# Patient Record
Sex: FEMALE | Race: WHITE | NOT HISPANIC OR LATINO | Employment: FULL TIME | URBAN - METROPOLITAN AREA
[De-identification: names, ages, dates, MRNs, and addresses within clinical notes are randomized per-mention and may not be internally consistent; named-entity substitution may affect disease eponyms.]

---

## 2017-01-03 ENCOUNTER — TELEPHONE (OUTPATIENT)
Dept: GENETICS | Facility: CLINIC | Age: 57
End: 2017-01-03

## 2017-01-03 NOTE — TELEPHONE ENCOUNTER
Spoke with pt and scheduled an appt for her on 2/1/17 w/ De[facility] per Dr. Kaye. Pt verbalized understanding.

## 2017-01-04 ENCOUNTER — TELEPHONE (OUTPATIENT)
Dept: ADMINISTRATIVE | Facility: OTHER | Age: 57
End: 2017-01-04

## 2017-01-04 ENCOUNTER — TELEPHONE (OUTPATIENT)
Dept: HEMATOLOGY/ONCOLOGY | Facility: CLINIC | Age: 57
End: 2017-01-04

## 2017-01-04 NOTE — TELEPHONE ENCOUNTER
Returned call to sister and informed her that okay to proceed with surprise party.   Sister verbalized understanding.     ----- Message from Rebecca Fournier sent at 1/4/2017 12:02 PM CST -----  Contact: Pt's sister Ashley   Pt's sister Ashley is calling to speak with a nurse in regards to her sister's chemo and how it will affect her bc they planned a surprise birthday party for her on Saturday and they need to know if they should cancel it bc of her chemo.    Contact number is [phone](Please call this number)

## 2017-01-04 NOTE — TELEPHONE ENCOUNTER
----- Message from Wendi Nguyễn MD sent at 12/21/2016  2:22 PM CST -----  - start [place], Abraxane next week (cleared today)  - RTC 1 week later with labd and EP and chemo

## 2017-01-06 ENCOUNTER — INFUSION (OUTPATIENT)
Dept: INFUSION THERAPY | Facility: HOSPITAL | Age: 57
End: 2017-01-06
Attending: INTERNAL MEDICINE
Payer: COMMERCIAL

## 2017-01-06 VITALS
HEART RATE: 69 BPM | TEMPERATURE: 99 F | SYSTOLIC BLOOD PRESSURE: 118 MMHG | DIASTOLIC BLOOD PRESSURE: 61 MMHG | RESPIRATION RATE: 18 BRPM

## 2017-01-06 DIAGNOSIS — C25.9 METASTASIS FROM PANCREATIC CANCER: Primary | ICD-10-CM

## 2017-01-06 DIAGNOSIS — C79.9 METASTASIS FROM PANCREATIC CANCER: Primary | ICD-10-CM

## 2017-01-06 DIAGNOSIS — C25.9 PANCREATIC ADENOCARCINOMA: Primary | ICD-10-CM

## 2017-01-06 PROCEDURE — 25000003 PHARM REV CODE 250: Performed by: INTERNAL MEDICINE

## 2017-01-06 PROCEDURE — 96367 TX/PROPH/DG ADDL SEQ IV INF: CPT

## 2017-01-06 PROCEDURE — 96417 CHEMO IV INFUS EACH ADDL SEQ: CPT

## 2017-01-06 PROCEDURE — 63600175 PHARM REV CODE 636 W HCPCS: Performed by: INTERNAL MEDICINE

## 2017-01-06 PROCEDURE — 96413 CHEMO IV INFUSION 1 HR: CPT

## 2017-01-06 RX ORDER — SODIUM CHLORIDE 0.9 % (FLUSH) 0.9 %
10 SYRINGE (ML) INJECTION
Status: DISCONTINUED | OUTPATIENT
Start: 2017-01-06 | End: 2017-01-06 | Stop reason: HOSPADM

## 2017-01-06 RX ORDER — HEPARIN 100 UNIT/ML
500 SYRINGE INTRAVENOUS
Status: DISCONTINUED | OUTPATIENT
Start: 2017-01-06 | End: 2017-01-06 | Stop reason: HOSPADM

## 2017-01-06 RX ADMIN — SODIUM CHLORIDE: 0.9 INJECTION, SOLUTION INTRAVENOUS at 01:01

## 2017-01-06 RX ADMIN — SODIUM CHLORIDE 1410 MG: 0.9 INJECTION, SOLUTION INTRAVENOUS at 01:01

## 2017-01-06 RX ADMIN — Medication 1 MG: at 01:01

## 2017-01-06 RX ADMIN — PACLITAXEL 180 MG: 100 INJECTION, POWDER, LYOPHILIZED, FOR SUSPENSION INTRAVENOUS at 01:01

## 2017-01-12 ENCOUNTER — LAB VISIT (OUTPATIENT)
Dept: LAB | Facility: HOSPITAL | Age: 57
End: 2017-01-12
Attending: INTERNAL MEDICINE
Payer: COMMERCIAL

## 2017-01-12 DIAGNOSIS — C25.9 METASTASIS FROM PANCREATIC CANCER: Primary | ICD-10-CM

## 2017-01-12 DIAGNOSIS — R11.2 NAUSEA AND VOMITING, INTRACTABILITY OF VOMITING NOT SPECIFIED, UNSPECIFIED VOMITING TYPE: Primary | ICD-10-CM

## 2017-01-12 DIAGNOSIS — C79.9 METASTASIS FROM PANCREATIC CANCER: ICD-10-CM

## 2017-01-12 DIAGNOSIS — C79.9 METASTASIS FROM PANCREATIC CANCER: Primary | ICD-10-CM

## 2017-01-12 DIAGNOSIS — C25.9 METASTASIS FROM PANCREATIC CANCER: ICD-10-CM

## 2017-01-12 LAB
ALBUMIN SERPL BCP-MCNC: 2.9 G/DL
ALP SERPL-CCNC: 87 U/L
ALT SERPL W/O P-5'-P-CCNC: 11 U/L
ANION GAP SERPL CALC-SCNC: 9 MMOL/L
AST SERPL-CCNC: 13 U/L
BILIRUB SERPL-MCNC: 0.5 MG/DL
BUN SERPL-MCNC: 8 MG/DL
CALCIUM SERPL-MCNC: 9.4 MG/DL
CHLORIDE SERPL-SCNC: 105 MMOL/L
CO2 SERPL-SCNC: 26 MMOL/L
CREAT SERPL-MCNC: 0.6 MG/DL
ERYTHROCYTE [DISTWIDTH] IN BLOOD BY AUTOMATED COUNT: 13.2 %
EST. GFR  (AFRICAN AMERICAN): >60 ML/MIN/1.73 M^2
EST. GFR  (NON AFRICAN AMERICAN): >60 ML/MIN/1.73 M^2
GLUCOSE SERPL-MCNC: 96 MG/DL
HCT VFR BLD AUTO: 30.2 %
HGB BLD-MCNC: 10.2 G/DL
MCH RBC QN AUTO: 29.4 PG
MCHC RBC AUTO-ENTMCNC: 33.8 %
MCV RBC AUTO: 87 FL
NEUTROPHILS # BLD AUTO: 0.7 K/UL
PLATELET # BLD AUTO: 161 K/UL
PMV BLD AUTO: 8.8 FL
POTASSIUM SERPL-SCNC: 4.4 MMOL/L
PROT SERPL-MCNC: 6.4 G/DL
RBC # BLD AUTO: 3.47 M/UL
SODIUM SERPL-SCNC: 140 MMOL/L
WBC # BLD AUTO: 1.47 K/UL

## 2017-01-12 PROCEDURE — 80053 COMPREHEN METABOLIC PANEL: CPT

## 2017-01-12 PROCEDURE — 36415 COLL VENOUS BLD VENIPUNCTURE: CPT

## 2017-01-12 PROCEDURE — 85027 COMPLETE CBC AUTOMATED: CPT

## 2017-01-12 RX ORDER — ONDANSETRON 8 MG/1
8 TABLET, ORALLY DISINTEGRATING ORAL EVERY 6 HOURS PRN
Qty: 100 TABLET | Refills: 1 | Status: SHIPPED | OUTPATIENT
Start: 2017-01-12 | End: 2017-03-16 | Stop reason: SDUPTHER

## 2017-01-17 RX ORDER — HEPARIN 100 UNIT/ML
500 SYRINGE INTRAVENOUS
Status: CANCELLED | OUTPATIENT
Start: 2017-01-17

## 2017-01-17 RX ORDER — SODIUM CHLORIDE 0.9 % (FLUSH) 0.9 %
10 SYRINGE (ML) INJECTION
Status: CANCELLED | OUTPATIENT
Start: 2017-01-17

## 2017-01-19 ENCOUNTER — LAB VISIT (OUTPATIENT)
Dept: LAB | Facility: HOSPITAL | Age: 57
End: 2017-01-19
Attending: INTERNAL MEDICINE
Payer: COMMERCIAL

## 2017-01-19 DIAGNOSIS — C79.9 METASTASIS FROM PANCREATIC CANCER: ICD-10-CM

## 2017-01-19 DIAGNOSIS — C25.9 METASTASIS FROM PANCREATIC CANCER: ICD-10-CM

## 2017-01-19 LAB
ALBUMIN SERPL BCP-MCNC: 3.4 G/DL
ALP SERPL-CCNC: 90 U/L
ALT SERPL W/O P-5'-P-CCNC: 13 U/L
ANION GAP SERPL CALC-SCNC: 7 MMOL/L
AST SERPL-CCNC: 21 U/L
BILIRUB SERPL-MCNC: 0.4 MG/DL
BUN SERPL-MCNC: 10 MG/DL
CALCIUM SERPL-MCNC: 9.7 MG/DL
CHLORIDE SERPL-SCNC: 104 MMOL/L
CO2 SERPL-SCNC: 28 MMOL/L
CREAT SERPL-MCNC: 0.7 MG/DL
ERYTHROCYTE [DISTWIDTH] IN BLOOD BY AUTOMATED COUNT: 14.5 %
EST. GFR  (AFRICAN AMERICAN): >60 ML/MIN/1.73 M^2
EST. GFR  (NON AFRICAN AMERICAN): >60 ML/MIN/1.73 M^2
GLUCOSE SERPL-MCNC: 148 MG/DL
HCT VFR BLD AUTO: 36 %
HGB BLD-MCNC: 12 G/DL
MCH RBC QN AUTO: 29.7 PG
MCHC RBC AUTO-ENTMCNC: 33.3 %
MCV RBC AUTO: 89 FL
NEUTROPHILS # BLD AUTO: 3.1 K/UL
PLATELET # BLD AUTO: 242 K/UL
PMV BLD AUTO: 9.4 FL
POTASSIUM SERPL-SCNC: 4.7 MMOL/L
PROT SERPL-MCNC: 6.9 G/DL
RBC # BLD AUTO: 4.04 M/UL
SODIUM SERPL-SCNC: 139 MMOL/L
WBC # BLD AUTO: 4.78 K/UL

## 2017-01-19 PROCEDURE — 36415 COLL VENOUS BLD VENIPUNCTURE: CPT

## 2017-01-19 PROCEDURE — 85027 COMPLETE CBC AUTOMATED: CPT

## 2017-01-19 PROCEDURE — 80053 COMPREHEN METABOLIC PANEL: CPT

## 2017-01-20 ENCOUNTER — INFUSION (OUTPATIENT)
Dept: INFUSION THERAPY | Facility: HOSPITAL | Age: 57
End: 2017-01-20
Attending: INTERNAL MEDICINE
Payer: COMMERCIAL

## 2017-01-20 VITALS
DIASTOLIC BLOOD PRESSURE: 64 MMHG | SYSTOLIC BLOOD PRESSURE: 125 MMHG | BODY MASS INDEX: 21.37 KG/M2 | WEIGHT: 105.81 LBS | HEART RATE: 74 BPM | RESPIRATION RATE: 16 BRPM | TEMPERATURE: 98 F

## 2017-01-20 DIAGNOSIS — C25.9 PANCREATIC ADENOCARCINOMA: Primary | ICD-10-CM

## 2017-01-20 DIAGNOSIS — C25.9 PANCREATIC ADENOCARCINOMA: Chronic | ICD-10-CM

## 2017-01-20 DIAGNOSIS — C25.9 PANCREATIC CANCER METASTASIZED TO LIVER: Primary | ICD-10-CM

## 2017-01-20 DIAGNOSIS — M79.2 NEUROPATHIC PAIN: ICD-10-CM

## 2017-01-20 DIAGNOSIS — C78.7 LIVER METASTASES: ICD-10-CM

## 2017-01-20 DIAGNOSIS — S22.000A THORACIC COMPRESSION FRACTURE, CLOSED, INITIAL ENCOUNTER: ICD-10-CM

## 2017-01-20 DIAGNOSIS — C78.7 PANCREATIC CANCER METASTASIZED TO LIVER: Primary | ICD-10-CM

## 2017-01-20 PROCEDURE — 25000003 PHARM REV CODE 250: Performed by: INTERNAL MEDICINE

## 2017-01-20 PROCEDURE — 96417 CHEMO IV INFUS EACH ADDL SEQ: CPT

## 2017-01-20 PROCEDURE — 63600175 PHARM REV CODE 636 W HCPCS: Performed by: INTERNAL MEDICINE

## 2017-01-20 PROCEDURE — 96367 TX/PROPH/DG ADDL SEQ IV INF: CPT

## 2017-01-20 PROCEDURE — 96413 CHEMO IV INFUSION 1 HR: CPT

## 2017-01-20 RX ORDER — SODIUM CHLORIDE 0.9 % (FLUSH) 0.9 %
10 SYRINGE (ML) INJECTION
Status: DISCONTINUED | OUTPATIENT
Start: 2017-01-20 | End: 2017-01-20 | Stop reason: HOSPADM

## 2017-01-20 RX ORDER — HEPARIN 100 UNIT/ML
500 SYRINGE INTRAVENOUS
Status: DISCONTINUED | OUTPATIENT
Start: 2017-01-20 | End: 2017-01-20 | Stop reason: HOSPADM

## 2017-01-20 RX ORDER — GABAPENTIN 100 MG/1
100 CAPSULE ORAL 3 TIMES DAILY
Qty: 90 CAPSULE | Refills: 2 | Status: SHIPPED | OUTPATIENT
Start: 2017-01-20 | End: 2017-03-08

## 2017-01-20 RX ORDER — METHADONE HYDROCHLORIDE 5 MG/1
5 TABLET ORAL EVERY 12 HOURS
Qty: 60 TABLET | Refills: 0 | Status: SHIPPED | OUTPATIENT
Start: 2017-01-20 | End: 2017-02-10 | Stop reason: SDUPTHER

## 2017-01-20 RX ORDER — GABAPENTIN 300 MG/1
300 CAPSULE ORAL 3 TIMES DAILY
Qty: 90 CAPSULE | Refills: 2 | Status: SHIPPED | OUTPATIENT
Start: 2017-01-20 | End: 2017-03-16 | Stop reason: SDUPTHER

## 2017-01-20 RX ADMIN — PACLITAXEL 180 MG: 100 INJECTION, POWDER, LYOPHILIZED, FOR SUSPENSION INTRAVENOUS at 10:01

## 2017-01-20 RX ADMIN — GRANISETRON HYDROCHLORIDE 1 MG: 1 INJECTION, SOLUTION INTRAVENOUS at 10:01

## 2017-01-20 RX ADMIN — SODIUM CHLORIDE 1410 MG: 0.9 INJECTION, SOLUTION INTRAVENOUS at 11:01

## 2017-01-20 RX ADMIN — SODIUM CHLORIDE: 0.9 INJECTION, SOLUTION INTRAVENOUS at 10:01

## 2017-01-20 NOTE — PLAN OF CARE
Problem: Patient Care Overview  Goal: Plan of Care Review  Outcome: Ongoing (interventions implemented as appropriate)  Pt independently ambulated into chemo infusion today with spouse. Pt skin warm and dry, RR even and unlabored. Pt in NAD and appears comfortable. Pt denies any pain or discomfort and tolerated Gemzar/Abraxane treatment well. Patient follow up discussed and patient verbally expressed understanding.

## 2017-01-20 NOTE — NURSING
perscriptions refilled. Asked jose to tell dr del rosario to call [facility] later Having some numbness down left arm Has had this before Has tumors on back.

## 2017-01-26 ENCOUNTER — LAB VISIT (OUTPATIENT)
Dept: LAB | Facility: HOSPITAL | Age: 57
End: 2017-01-26
Attending: INTERNAL MEDICINE
Payer: COMMERCIAL

## 2017-01-26 DIAGNOSIS — C25.9 PANCREATIC CANCER METASTASIZED TO LIVER: ICD-10-CM

## 2017-01-26 DIAGNOSIS — C78.7 PANCREATIC CANCER METASTASIZED TO LIVER: ICD-10-CM

## 2017-01-26 LAB
ALBUMIN SERPL BCP-MCNC: 3 G/DL
ALP SERPL-CCNC: 79 U/L
ALT SERPL W/O P-5'-P-CCNC: 11 U/L
ANION GAP SERPL CALC-SCNC: 6 MMOL/L
AST SERPL-CCNC: 18 U/L
BILIRUB SERPL-MCNC: 0.5 MG/DL
BUN SERPL-MCNC: 8 MG/DL
CALCIUM SERPL-MCNC: 9.3 MG/DL
CHLORIDE SERPL-SCNC: 105 MMOL/L
CO2 SERPL-SCNC: 26 MMOL/L
CREAT SERPL-MCNC: 0.7 MG/DL
ERYTHROCYTE [DISTWIDTH] IN BLOOD BY AUTOMATED COUNT: 14 %
EST. GFR  (AFRICAN AMERICAN): >60 ML/MIN/1.73 M^2
EST. GFR  (NON AFRICAN AMERICAN): >60 ML/MIN/1.73 M^2
GLUCOSE SERPL-MCNC: 99 MG/DL
HCT VFR BLD AUTO: 31.8 %
HGB BLD-MCNC: 10.8 G/DL
MCH RBC QN AUTO: 29.8 PG
MCHC RBC AUTO-ENTMCNC: 34 %
MCV RBC AUTO: 88 FL
NEUTROPHILS # BLD AUTO: 1.6 K/UL
PLATELET # BLD AUTO: 221 K/UL
PMV BLD AUTO: 9.2 FL
POTASSIUM SERPL-SCNC: 3.8 MMOL/L
PROT SERPL-MCNC: 6.2 G/DL
RBC # BLD AUTO: 3.63 M/UL
SODIUM SERPL-SCNC: 137 MMOL/L
WBC # BLD AUTO: 2.42 K/UL

## 2017-01-26 PROCEDURE — 36415 COLL VENOUS BLD VENIPUNCTURE: CPT

## 2017-01-26 PROCEDURE — 85027 COMPLETE CBC AUTOMATED: CPT

## 2017-01-26 PROCEDURE — 80053 COMPREHEN METABOLIC PANEL: CPT

## 2017-01-26 RX ORDER — HEPARIN 100 UNIT/ML
500 SYRINGE INTRAVENOUS
Status: CANCELLED | OUTPATIENT
Start: 2017-01-26

## 2017-01-26 RX ORDER — SODIUM CHLORIDE 0.9 % (FLUSH) 0.9 %
10 SYRINGE (ML) INJECTION
Status: CANCELLED | OUTPATIENT
Start: 2017-01-26

## 2017-01-27 ENCOUNTER — HOSPITAL ENCOUNTER (OUTPATIENT)
Dept: RADIOLOGY | Facility: HOSPITAL | Age: 57
Discharge: HOME | End: 2017-01-27
Attending: INTERNAL MEDICINE
Payer: COMMERCIAL

## 2017-01-27 ENCOUNTER — INFUSION (OUTPATIENT)
Dept: INFUSION THERAPY | Facility: HOSPITAL | Age: 57
End: 2017-01-27
Attending: INTERNAL MEDICINE
Payer: COMMERCIAL

## 2017-01-27 VITALS
SYSTOLIC BLOOD PRESSURE: 115 MMHG | RESPIRATION RATE: 18 BRPM | WEIGHT: 105.81 LBS | DIASTOLIC BLOOD PRESSURE: 65 MMHG | BODY MASS INDEX: 21.33 KG/M2 | TEMPERATURE: 98 F | HEIGHT: 59 IN | HEART RATE: 76 BPM

## 2017-01-27 DIAGNOSIS — R06.00 DYSPNEA, UNSPECIFIED TYPE: ICD-10-CM

## 2017-01-27 DIAGNOSIS — C25.9 PANCREATIC ADENOCARCINOMA: Primary | ICD-10-CM

## 2017-01-27 DIAGNOSIS — R06.00 DYSPNEA, UNSPECIFIED TYPE: Primary | ICD-10-CM

## 2017-01-27 DIAGNOSIS — J69.0 ASPIRATION PNEUMONIA, UNSPECIFIED ASPIRATION PNEUMONIA TYPE, UNSPECIFIED LATERALITY, UNSPECIFIED PART OF LUNG: Primary | ICD-10-CM

## 2017-01-27 PROCEDURE — 63600175 PHARM REV CODE 636 W HCPCS: Performed by: INTERNAL MEDICINE

## 2017-01-27 PROCEDURE — 96367 TX/PROPH/DG ADDL SEQ IV INF: CPT

## 2017-01-27 PROCEDURE — 71020 XR CHEST PA AND LATERAL: CPT | Mod: TC

## 2017-01-27 PROCEDURE — 96413 CHEMO IV INFUSION 1 HR: CPT

## 2017-01-27 PROCEDURE — 25000003 PHARM REV CODE 250: Performed by: INTERNAL MEDICINE

## 2017-01-27 PROCEDURE — 96417 CHEMO IV INFUS EACH ADDL SEQ: CPT

## 2017-01-27 PROCEDURE — 71020 XR CHEST PA AND LATERAL: CPT | Mod: 26,,, | Performed by: RADIOLOGY

## 2017-01-27 RX ORDER — HEPARIN 100 UNIT/ML
500 SYRINGE INTRAVENOUS
Status: DISCONTINUED | OUTPATIENT
Start: 2017-01-27 | End: 2017-01-27 | Stop reason: HOSPADM

## 2017-01-27 RX ORDER — MOXIFLOXACIN HYDROCHLORIDE 400 MG/1
400 TABLET ORAL DAILY
Qty: 14 TABLET | Refills: 0 | Status: SHIPPED | OUTPATIENT
Start: 2017-01-27 | End: 2017-02-10

## 2017-01-27 RX ORDER — SODIUM CHLORIDE 0.9 % (FLUSH) 0.9 %
10 SYRINGE (ML) INJECTION
Status: DISCONTINUED | OUTPATIENT
Start: 2017-01-27 | End: 2017-01-27 | Stop reason: HOSPADM

## 2017-01-27 RX ADMIN — PACLITAXEL 180 MG: 100 INJECTION, POWDER, LYOPHILIZED, FOR SUSPENSION INTRAVENOUS at 11:01

## 2017-01-27 RX ADMIN — GEMCITABINE HYDROCHLORIDE 1410 MG: 200 INJECTION, POWDER, LYOPHILIZED, FOR SOLUTION INTRAVENOUS at 12:01

## 2017-01-27 RX ADMIN — SODIUM CHLORIDE: 0.9 INJECTION, SOLUTION INTRAVENOUS at 11:01

## 2017-01-27 RX ADMIN — GRANISETRON HYDROCHLORIDE 1 MG: 0.1 INJECTION, SOLUTION INTRAVENOUS at 11:01

## 2017-01-27 NOTE — NURSING
Patient complains of left arm tenderness, mild swelling also noted.  No redness present.  Patient states this started after receiving last weeks chemo and has been applying heating pad intermittently since.  Dr Nguyễn notified and request to see patient in office today after finishing today's chemo to further evaluate the need for a possible ultrasound.  Patient updated on plan of care, verbalizes understanding.  PIV started in right arm.  Will continue to monitor closely.  01/27/2017  Ayana Jain RN

## 2017-01-30 ENCOUNTER — TELEPHONE (OUTPATIENT)
Dept: ADMINISTRATIVE | Facility: OTHER | Age: 57
End: 2017-01-30

## 2017-01-30 ENCOUNTER — TELEPHONE (OUTPATIENT)
Dept: HEMATOLOGY/ONCOLOGY | Facility: CLINIC | Age: 57
End: 2017-01-30

## 2017-01-30 DIAGNOSIS — D70.1 CHEMOTHERAPY INDUCED NEUTROPENIA: ICD-10-CM

## 2017-01-30 DIAGNOSIS — T45.1X5A CHEMOTHERAPY INDUCED NEUTROPENIA: ICD-10-CM

## 2017-01-30 NOTE — TELEPHONE ENCOUNTER
"Returned call to patient.   Patient stated that she is doing well today---yesterday just had a hard time breathing.   Patient wanted to know if Dr. Nguyễn wanted her to come in today as she had had an XR done Friday and was dx with PNA (Dr. Nguyễn called in abx).   Patient also wanted to know if her shot had gotten approved that would "increase her cell count."   I informed her I would send text message to Dr. Nguyễn as she was on service and would get back with her.   Patient verbalized understanding.     Text message sent to Dr. Nguyễn.         ----- Message from Marilia Chung sent at 1/30/2017 10:05 AM CST -----  Contact: Patient  Patient called and wanted to speak with Orin.    Please call her at [phone]    "

## 2017-01-30 NOTE — TELEPHONE ENCOUNTER
----- Message from Wendi Nguyễn MD sent at 1/30/2017 12:58 PM CST -----  Please add to chemo schedule to start neupogen daily x 3 days starting tomorrow  Thanks

## 2017-01-31 ENCOUNTER — INFUSION (OUTPATIENT)
Dept: INFUSION THERAPY | Facility: HOSPITAL | Age: 57
End: 2017-01-31
Attending: INTERNAL MEDICINE
Payer: COMMERCIAL

## 2017-01-31 DIAGNOSIS — D70.1 CHEMOTHERAPY INDUCED NEUTROPENIA: Primary | ICD-10-CM

## 2017-01-31 DIAGNOSIS — T45.1X5A CHEMOTHERAPY INDUCED NEUTROPENIA: Primary | ICD-10-CM

## 2017-01-31 PROCEDURE — 96372 THER/PROPH/DIAG INJ SC/IM: CPT

## 2017-01-31 PROCEDURE — 63600175 PHARM REV CODE 636 W HCPCS: Performed by: INTERNAL MEDICINE

## 2017-01-31 RX ADMIN — FILGRASTIM 480 MCG: 480 INJECTION, SOLUTION INTRAVENOUS; SUBCUTANEOUS at 03:01

## 2017-01-31 NOTE — NURSING
Patient here for injection-teaching done on use and side effects of neupogen-patient and  expressed understanding-tolerated injection well.

## 2017-02-01 ENCOUNTER — TELEPHONE (OUTPATIENT)
Dept: HEMATOLOGY/ONCOLOGY | Facility: CLINIC | Age: 57
End: 2017-02-01

## 2017-02-01 ENCOUNTER — INFUSION (OUTPATIENT)
Dept: INFUSION THERAPY | Facility: HOSPITAL | Age: 57
End: 2017-02-01
Attending: INTERNAL MEDICINE
Payer: COMMERCIAL

## 2017-02-01 DIAGNOSIS — T45.1X5A CHEMOTHERAPY INDUCED NEUTROPENIA: Primary | ICD-10-CM

## 2017-02-01 DIAGNOSIS — D70.1 CHEMOTHERAPY INDUCED NEUTROPENIA: Primary | ICD-10-CM

## 2017-02-01 PROCEDURE — 63600175 PHARM REV CODE 636 W HCPCS: Performed by: INTERNAL MEDICINE

## 2017-02-01 PROCEDURE — 96372 THER/PROPH/DIAG INJ SC/IM: CPT

## 2017-02-01 RX ADMIN — FILGRASTIM 480 MCG: 480 INJECTION, SOLUTION INTRAVENOUS; SUBCUTANEOUS at 02:02

## 2017-02-01 NOTE — TELEPHONE ENCOUNTER
Returned call to patient.   Patient had a question about Zofran and if she was taking medication too often---patient stated she has been taking 4 times a day.   I informed her medication is every 6 hours so 4 times a day was fine.   Patient verbalized understanding.       ----- Message from Christine Mathew sent at 2/1/2017  4:25 PM CST -----  Contact: PT  Would like Orin to call her, regarding her medication. ondansetron (ZOFRAN-ODT) 8 MG [facility]    Call: [phone]

## 2017-02-02 ENCOUNTER — INFUSION (OUTPATIENT)
Dept: INFUSION THERAPY | Facility: HOSPITAL | Age: 57
End: 2017-02-02
Attending: INTERNAL MEDICINE
Payer: COMMERCIAL

## 2017-02-02 DIAGNOSIS — T45.1X5A CHEMOTHERAPY INDUCED NEUTROPENIA: Primary | ICD-10-CM

## 2017-02-02 DIAGNOSIS — D70.1 CHEMOTHERAPY INDUCED NEUTROPENIA: Primary | ICD-10-CM

## 2017-02-02 PROCEDURE — 63600175 PHARM REV CODE 636 W HCPCS: Performed by: INTERNAL MEDICINE

## 2017-02-02 PROCEDURE — 96372 THER/PROPH/DIAG INJ SC/IM: CPT

## 2017-02-02 RX ADMIN — FILGRASTIM 480 MCG: 480 INJECTION, SOLUTION INTRAVENOUS; SUBCUTANEOUS at 02:02

## 2017-02-07 ENCOUNTER — TELEPHONE (OUTPATIENT)
Dept: HEMATOLOGY/ONCOLOGY | Facility: CLINIC | Age: 57
End: 2017-02-07

## 2017-02-07 ENCOUNTER — PATIENT MESSAGE (OUTPATIENT)
Dept: HEMATOLOGY/ONCOLOGY | Facility: CLINIC | Age: 57
End: 2017-02-07

## 2017-02-07 NOTE — TELEPHONE ENCOUNTER
Returned call to patient.   Informed her [facility] message received and fwd to Dr. Nguyễn and as soon as I know something I will give her a call back.   Patient verbalized understanding.       ----- Message from James Polo sent at 2/7/2017 10:52 AM CST -----  Contact: self  Pt is calling to check the status of scheduled appointments for labs at [facility], chemo on Friday at [facility] and a doctor visit.  Contact number [phone]

## 2017-02-08 ENCOUNTER — TELEPHONE (OUTPATIENT)
Dept: HEMATOLOGY/ONCOLOGY | Facility: CLINIC | Age: 57
End: 2017-02-08

## 2017-02-08 DIAGNOSIS — C25.9 METASTASIS FROM PANCREATIC CANCER: Primary | ICD-10-CM

## 2017-02-08 DIAGNOSIS — C79.9 METASTASIS FROM PANCREATIC CANCER: Primary | ICD-10-CM

## 2017-02-08 NOTE — TELEPHONE ENCOUNTER
Returned call to patient.   Patient asked if Dr. Nguyễn could prescribe some breathing treatments for her---patient stated she has a machine at home she would just need to be shown how to use it.   Patient stated that antibiotics have helped a great bit---however, she is still having some tightness in her chest and SOB on exertion.   Patient stated she didn't know if she should just wait until she has her appt with Dr. Nguyễn on Friday.   I informed patient I would fwd message to Dr. Nguyễn to get her recommendations and get back with her.   Patient verbalized understanding.     Message routed to Dr. Nguyễn.       ----- Message from Iona Last sent at 2/8/2017  8:52 AM CST -----  Patient would like the nurse to give her a call back. Says she thinks she needs breathing treatment. Says she has a machine at home but would be the medication and also needs to be shown how to use it. She can be reached at [phone].

## 2017-02-08 NOTE — TELEPHONE ENCOUNTER
Called back and informed her we will see Friday and decide nebs vs inhaler.   Pt verbalized understanding.

## 2017-02-09 ENCOUNTER — LAB VISIT (OUTPATIENT)
Dept: LAB | Facility: HOSPITAL | Age: 57
End: 2017-02-09
Attending: INTERNAL MEDICINE
Payer: COMMERCIAL

## 2017-02-09 DIAGNOSIS — C79.9 METASTASIS FROM PANCREATIC CANCER: ICD-10-CM

## 2017-02-09 DIAGNOSIS — C25.9 METASTASIS FROM PANCREATIC CANCER: ICD-10-CM

## 2017-02-09 LAB
25(OH)D3+25(OH)D2 SERPL-MCNC: 31 NG/ML
ALBUMIN SERPL BCP-MCNC: 3.2 G/DL
ALP SERPL-CCNC: 108 U/L
ALT SERPL W/O P-5'-P-CCNC: 12 U/L
ANION GAP SERPL CALC-SCNC: 7 MMOL/L
AST SERPL-CCNC: 18 U/L
BILIRUB SERPL-MCNC: 0.5 MG/DL
BUN SERPL-MCNC: 9 MG/DL
CALCIUM SERPL-MCNC: 9.2 MG/DL
CHLORIDE SERPL-SCNC: 107 MMOL/L
CO2 SERPL-SCNC: 26 MMOL/L
CREAT SERPL-MCNC: 0.7 MG/DL
ERYTHROCYTE [DISTWIDTH] IN BLOOD BY AUTOMATED COUNT: 17.6 %
EST. GFR  (AFRICAN AMERICAN): >60 ML/MIN/1.73 M^2
EST. GFR  (NON AFRICAN AMERICAN): >60 ML/MIN/1.73 M^2
GLUCOSE SERPL-MCNC: 130 MG/DL
HCT VFR BLD AUTO: 33.9 %
HGB BLD-MCNC: 11 G/DL
MCH RBC QN AUTO: 29.9 PG
MCHC RBC AUTO-ENTMCNC: 32.4 %
MCV RBC AUTO: 92 FL
NEUTROPHILS # BLD AUTO: 3.7 K/UL
PLATELET # BLD AUTO: 205 K/UL
PMV BLD AUTO: 10.4 FL
POTASSIUM SERPL-SCNC: 4.8 MMOL/L
PROT SERPL-MCNC: 6.3 G/DL
RBC # BLD AUTO: 3.68 M/UL
SODIUM SERPL-SCNC: 140 MMOL/L
WBC # BLD AUTO: 6.11 K/UL

## 2017-02-09 PROCEDURE — 85027 COMPLETE CBC AUTOMATED: CPT

## 2017-02-09 PROCEDURE — 82306 VITAMIN D 25 HYDROXY: CPT

## 2017-02-09 PROCEDURE — 36415 COLL VENOUS BLD VENIPUNCTURE: CPT

## 2017-02-09 PROCEDURE — 80053 COMPREHEN METABOLIC PANEL: CPT

## 2017-02-09 RX ORDER — HEPARIN 100 UNIT/ML
500 SYRINGE INTRAVENOUS
Status: CANCELLED | OUTPATIENT
Start: 2017-02-09

## 2017-02-09 RX ORDER — HEPARIN 100 UNIT/ML
500 SYRINGE INTRAVENOUS
Status: CANCELLED | OUTPATIENT
Start: 2017-02-18

## 2017-02-09 RX ORDER — SODIUM CHLORIDE 0.9 % (FLUSH) 0.9 %
10 SYRINGE (ML) INJECTION
Status: CANCELLED | OUTPATIENT
Start: 2017-02-18

## 2017-02-09 RX ORDER — HEPARIN 100 UNIT/ML
500 SYRINGE INTRAVENOUS
Status: CANCELLED | OUTPATIENT
Start: 2017-02-11

## 2017-02-09 RX ORDER — SODIUM CHLORIDE 0.9 % (FLUSH) 0.9 %
10 SYRINGE (ML) INJECTION
Status: CANCELLED | OUTPATIENT
Start: 2017-02-11

## 2017-02-09 RX ORDER — SODIUM CHLORIDE 0.9 % (FLUSH) 0.9 %
10 SYRINGE (ML) INJECTION
Status: CANCELLED | OUTPATIENT
Start: 2017-02-09

## 2017-02-10 ENCOUNTER — OFFICE VISIT (OUTPATIENT)
Dept: HEMATOLOGY/ONCOLOGY | Facility: CLINIC | Age: 57
End: 2017-02-10
Attending: INTERNAL MEDICINE
Payer: COMMERCIAL

## 2017-02-10 ENCOUNTER — INFUSION (OUTPATIENT)
Dept: INFUSION THERAPY | Facility: HOSPITAL | Age: 57
End: 2017-02-10
Attending: INTERNAL MEDICINE
Payer: COMMERCIAL

## 2017-02-10 ENCOUNTER — HOSPITAL ENCOUNTER (OUTPATIENT)
Dept: RADIOLOGY | Facility: OTHER | Age: 57
Discharge: HOME | End: 2017-02-10
Attending: INTERNAL MEDICINE
Payer: COMMERCIAL

## 2017-02-10 VITALS
DIASTOLIC BLOOD PRESSURE: 58 MMHG | RESPIRATION RATE: 20 BRPM | OXYGEN SATURATION: 94 % | WEIGHT: 108.25 LBS | BODY MASS INDEX: 21.82 KG/M2 | HEART RATE: 79 BPM | TEMPERATURE: 98 F | SYSTOLIC BLOOD PRESSURE: 96 MMHG | HEIGHT: 59 IN

## 2017-02-10 VITALS
TEMPERATURE: 98 F | RESPIRATION RATE: 18 BRPM | HEART RATE: 85 BPM | DIASTOLIC BLOOD PRESSURE: 66 MMHG | SYSTOLIC BLOOD PRESSURE: 115 MMHG

## 2017-02-10 DIAGNOSIS — J90 PLEURAL EFFUSION: ICD-10-CM

## 2017-02-10 DIAGNOSIS — S22.000A THORACIC COMPRESSION FRACTURE, CLOSED, INITIAL ENCOUNTER: ICD-10-CM

## 2017-02-10 DIAGNOSIS — J90 PLEURAL EFFUSION: Primary | ICD-10-CM

## 2017-02-10 DIAGNOSIS — C25.9 PANCREATIC ADENOCARCINOMA: Chronic | ICD-10-CM

## 2017-02-10 DIAGNOSIS — R60.9 EDEMA, UNSPECIFIED TYPE: Primary | ICD-10-CM

## 2017-02-10 DIAGNOSIS — Z85.07 HISTORY OF PANCREATIC CANCER: ICD-10-CM

## 2017-02-10 DIAGNOSIS — C78.7 LIVER METASTASES: ICD-10-CM

## 2017-02-10 DIAGNOSIS — C25.9 PANCREATIC ADENOCARCINOMA: Primary | ICD-10-CM

## 2017-02-10 PROCEDURE — 63600175 PHARM REV CODE 636 W HCPCS: Performed by: INTERNAL MEDICINE

## 2017-02-10 PROCEDURE — 71020 XR CHEST PA AND LATERAL: CPT | Mod: 26,,, | Performed by: RADIOLOGY

## 2017-02-10 PROCEDURE — 25000003 PHARM REV CODE 250: Performed by: INTERNAL MEDICINE

## 2017-02-10 PROCEDURE — 96413 CHEMO IV INFUSION 1 HR: CPT

## 2017-02-10 PROCEDURE — 71020 XR CHEST PA AND LATERAL: CPT | Mod: TC

## 2017-02-10 PROCEDURE — 99214 OFFICE O/P EST MOD 30 MIN: CPT | Mod: 25,S$GLB,, | Performed by: INTERNAL MEDICINE

## 2017-02-10 PROCEDURE — 96367 TX/PROPH/DG ADDL SEQ IV INF: CPT

## 2017-02-10 PROCEDURE — 96417 CHEMO IV INFUS EACH ADDL SEQ: CPT

## 2017-02-10 PROCEDURE — 99999 PR PBB SHADOW E&M-EST. PATIENT-LVL III: CPT | Mod: PBBFAC,,, | Performed by: INTERNAL MEDICINE

## 2017-02-10 RX ORDER — FUROSEMIDE 20 MG/1
20 TABLET ORAL EVERY OTHER DAY
Qty: 15 TABLET | Refills: 1 | Status: SHIPPED | OUTPATIENT
Start: 2017-02-10 | End: 2017-02-21 | Stop reason: SDUPTHER

## 2017-02-10 RX ORDER — METHADONE HYDROCHLORIDE 5 MG/1
5 TABLET ORAL EVERY 12 HOURS
Qty: 60 TABLET | Refills: 0 | Status: SHIPPED | OUTPATIENT
Start: 2017-02-10 | End: 2017-03-31 | Stop reason: SDUPTHER

## 2017-02-10 RX ORDER — SODIUM CHLORIDE 0.9 % (FLUSH) 0.9 %
10 SYRINGE (ML) INJECTION
Status: DISCONTINUED | OUTPATIENT
Start: 2017-02-10 | End: 2017-02-10 | Stop reason: HOSPADM

## 2017-02-10 RX ORDER — MORPHINE SULFATE 15 MG/1
45 TABLET, FILM COATED, EXTENDED RELEASE ORAL EVERY 8 HOURS
Qty: 270 TABLET | Refills: 0 | Status: SHIPPED | OUTPATIENT
Start: 2017-02-10 | End: 2017-04-17 | Stop reason: SDUPTHER

## 2017-02-10 RX ORDER — METOCLOPRAMIDE 10 MG/1
10 TABLET ORAL
Qty: 90 TABLET | Refills: 1 | Status: SHIPPED | OUTPATIENT
Start: 2017-02-10 | End: 2017-03-16 | Stop reason: SDUPTHER

## 2017-02-10 RX ORDER — HEPARIN 100 UNIT/ML
500 SYRINGE INTRAVENOUS
Status: DISCONTINUED | OUTPATIENT
Start: 2017-02-10 | End: 2017-02-10 | Stop reason: HOSPADM

## 2017-02-10 RX ADMIN — PACLITAXEL 180 MG: 100 INJECTION, POWDER, LYOPHILIZED, FOR SUSPENSION INTRAVENOUS at 12:02

## 2017-02-10 RX ADMIN — GEMCITABINE 1410 MG: 38 INJECTION, SOLUTION INTRAVENOUS at 01:02

## 2017-02-10 RX ADMIN — SODIUM CHLORIDE: 0.9 INJECTION, SOLUTION INTRAVENOUS at 11:02

## 2017-02-10 RX ADMIN — GRANISETRON HYDROCHLORIDE: 1 INJECTION, SOLUTION INTRAVENOUS at 11:02

## 2017-02-10 NOTE — PROGRESS NOTES
Subjective:       Patient ID: Lashay Whitley is a 57 y.o. female.    Chief Complaint: Follow-up; Chest Congestion; and Wheezing    HPI     Presents for cycle # 2 of chemotherapy  Reports continued shortness of breath- worse with exertion- fairly stable  Notes wheezing  Cough after- clear mucosal discharge also noted in AM  antibiotics have improved symptoms     Weight up  Eating better although still nauseated     Back pain improving      She had a recent MRI and question has been raised if pain from metastatic disease  MRI 11/30/16:  Impression      Mild T4 superior endplate compression fracture.  Degree of fracture has slightly increased as compared to previous MR from 10/25/16.  Persistent mild anterior epidural enhancement visualized posterior to the T4 vertebral body.  Potential pathologic fracture not excluded given patient's malignancy history.    Additional indeterminate T1 hypointense foci within the T5, T8, and T9 vertebral bodies, with potential metastatic disease not excluded.         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   Started on FOLFIRINOX as neoadjuvant therapy and completed 4 cycles.  Started on neoadjuvant chemotherapy and XRT which was completed with some complications.  Underwent a Whipple procedure on 4/12/16.   Pathology was reviewed.   Specimens: Head of pancreas, duodenum, common bile duct, gallbladder  -Procedure: Pancreaticoduodenectomy (Whipple resection), partial pancreatectomy  -Tumor site: Pancreatic head  -Tumor size: 20 mm  -Histologic type: Ductal adenocarcinoma  -Histologic grade: Well-differentiated (grade 1 )  -Microscopic tumor extension: Tumor invades peripancreatic soft tissues  -Margins:  -Margins are negative for invasive carcinoma  -Distance to closest distance to closest retroperitoneal margin is 3 mm  -Distance to closest SMV margin is 1.5 mm  -Distance to closest pancreatic margin is 12 mm  -Treatment effect: Present,  residual cancer with tumor regression but more than single cells or rare small groups of  cells (partial response, score 2)  -No lymphovascular invasion  -Perineural invasion is present  -Pathologic staging: ypT3 N1 MX  -Lymph nodes:  -Total number of lymph nodes examined: 25  -Total number of lymph nodes involved: 1  She started adjuvant gemcitabine but this was discontinued because of poor tolerance.  - she recurred with metastatic disease recently and has had rising   EUS with liver biopsy on 11/22/16 confirmed metastatic disease  CT scans reviewed as well  Bone involvement in T spine required XRT      Also of note she has a history of breast cancer.     Review of Systems   Constitutional: Negative for activity change, appetite change, chills, diaphoresis, fatigue, fever and unexpected weight change.   HENT: Negative for congestion, dental problem, ear pain, hearing loss, mouth sores, nosebleeds, rhinorrhea, tinnitus and trouble swallowing.    Eyes: Negative for redness and visual disturbance.   Respiratory: Negative for cough, chest tightness, shortness of breath and wheezing.    Cardiovascular: Negative for chest pain, palpitations and leg swelling.   Gastrointestinal: Negative for abdominal distention, abdominal pain, blood in stool, constipation, diarrhea, nausea and vomiting.   Genitourinary: Negative for decreased urine volume, difficulty urinating, dysuria, frequency and hematuria.   Musculoskeletal: Negative for arthralgias, back pain, gait problem, joint swelling and neck pain.   Skin: Negative for pallor and rash.   Neurological: Negative for dizziness, weakness, light-headedness, numbness and headaches.   Hematological: Negative for adenopathy. Does not bruise/bleed easily.   Psychiatric/Behavioral: Negative for confusion, dysphoric mood and sleep disturbance.       Objective:      Physical Exam   Constitutional: She is oriented to person, place, and time. She appears well-developed and  well-nourished. No distress.   Presents with her    HENT:   Head: Normocephalic and atraumatic.   Right Ear: External ear normal.   Left Ear: External ear normal.   Nose: Nose normal.   Mouth/Throat: Oropharynx is clear and moist. No oropharyngeal exudate.   Eyes: Conjunctivae and EOM are normal. Pupils are equal, round, and reactive to light. Right eye exhibits no discharge. Left eye exhibits no discharge. No scleral icterus. Right pupil is round and reactive. Left pupil is round and reactive.   Neck: Normal range of motion. Neck supple. No tracheal deviation present. No thyromegaly present.   Cardiovascular: Normal rate, regular rhythm, normal heart sounds and intact distal pulses.  Exam reveals no gallop and no friction rub.    No murmur heard.  Pulmonary/Chest: Effort normal and breath sounds normal. No respiratory distress. She has no wheezes. She has no rales. She exhibits no tenderness.   Abdominal: Soft. Bowel sounds are normal. She exhibits no distension and no mass. There is no hepatosplenomegaly. There is no tenderness. There is no rebound and no guarding.   No organomegaly   Musculoskeletal: Normal range of motion. She exhibits deformity. She exhibits no edema or tenderness.   Lymphadenopathy:        Head (right side): No submandibular adenopathy present.        Head (left side): No submandibular adenopathy present.     She has no cervical adenopathy.        Right cervical: No superficial cervical, no deep cervical and no posterior cervical adenopathy present.       Left cervical: No superficial cervical, no deep cervical and no posterior cervical adenopathy present.        Right: No inguinal and no supraclavicular adenopathy present.        Left: No inguinal and no supraclavicular adenopathy present.   Neurological: She is alert and oriented to person, place, and time. She has normal strength. No cranial nerve deficit or sensory deficit. She exhibits normal muscle tone. Coordination normal.   4/5  strength RLE   Skin: Skin is warm and dry. No bruising, no lesion, no petechiae and no rash noted. She is not diaphoretic. No erythema. No pallor.   Psychiatric: She has a normal mood and affect. Her behavior is normal. Judgment and thought content normal. Her mood appears not anxious. She does not exhibit a depressed mood.   Nursing note and vitals reviewed.    Labs- reviewed  Assessment:       1. History of pancreatic cancer    2. Pancreatic adenocarcinoma    3. Liver metastases    4. Thoracic compression fracture, closed, initial encounter        Plan:     Proceed with cycle # 2 oc chemotherapy  Restaging post with scans and labs  Pain with improved control  Effusion improving- will monitor closely  Knows to call for any issues

## 2017-02-10 NOTE — PLAN OF CARE
"Problem: Patient Care Overview  Goal: Plan of Care Review  Outcome: Ongoing (interventions implemented as appropriate)  Pt independently ambulated into chemo infusion clinic today with spouse. Pt skin warm and dry, RR even and unlabored. Pt in NAD and appears comfortable. Pt reports mild chest wall pain with exertion that she describes as "for some time." Pt also reports nausea and states, "Dr. Nguyễn is giving me a new prescription for something else."  Pt tolerated Abraxane/Gemzar treatment well. Dr. Nguyễn to see pt during treatment.  Patient follow up discussed and patient verbally expressed understanding.       "

## 2017-02-14 ENCOUNTER — PATIENT MESSAGE (OUTPATIENT)
Dept: INTERNAL MEDICINE | Facility: CLINIC | Age: 57
End: 2017-02-14

## 2017-02-14 ENCOUNTER — APPOINTMENT (OUTPATIENT)
Dept: LAB | Facility: HOSPITAL | Age: 57
End: 2017-02-14
Attending: INTERNAL MEDICINE
Payer: COMMERCIAL

## 2017-02-14 ENCOUNTER — TELEPHONE (OUTPATIENT)
Dept: HEMATOLOGY/ONCOLOGY | Facility: CLINIC | Age: 57
End: 2017-02-14

## 2017-02-14 PROCEDURE — 85025 COMPLETE CBC W/AUTO DIFF WBC: CPT

## 2017-02-14 PROCEDURE — 36415 COLL VENOUS BLD VENIPUNCTURE: CPT | Mod: PO

## 2017-02-14 NOTE — TELEPHONE ENCOUNTER
Called and informed her labs good---no neupogen needed.   Patient verbalized understanding.       ----- Message from Wendi Nguyễn MD sent at 2/14/2017 11:42 AM CST -----  Contact: Pt  No neupogen    ----- Message -----     From: Orin Porter     Sent: 2/14/2017  11:13 AM       To: Wendi Nguyễn MD    Lab results done today to see if neupogen needed!    ----- Message -----     From: Gracy Ward     Sent: 2/14/2017  11:11 AM       To: [facility] Staff    Pt is requesting her lab results from today    Pt contact number [phone]  Thanks

## 2017-02-16 ENCOUNTER — LAB VISIT (OUTPATIENT)
Dept: LAB | Facility: HOSPITAL | Age: 57
End: 2017-02-16
Attending: INTERNAL MEDICINE
Payer: COMMERCIAL

## 2017-02-16 DIAGNOSIS — S22.000A THORACIC COMPRESSION FRACTURE, CLOSED, INITIAL ENCOUNTER: ICD-10-CM

## 2017-02-16 DIAGNOSIS — J90 PLEURAL EFFUSION: ICD-10-CM

## 2017-02-16 DIAGNOSIS — Z85.07 HISTORY OF PANCREATIC CANCER: ICD-10-CM

## 2017-02-16 DIAGNOSIS — C25.9 PANCREATIC ADENOCARCINOMA: Chronic | ICD-10-CM

## 2017-02-16 DIAGNOSIS — C78.7 LIVER METASTASES: ICD-10-CM

## 2017-02-16 LAB
ALBUMIN SERPL BCP-MCNC: 3.3 G/DL
ALP SERPL-CCNC: 107 U/L
ALT SERPL W/O P-5'-P-CCNC: 15 U/L
ANION GAP SERPL CALC-SCNC: 11 MMOL/L
AST SERPL-CCNC: 21 U/L
BILIRUB SERPL-MCNC: 0.6 MG/DL
BUN SERPL-MCNC: 9 MG/DL
CALCIUM SERPL-MCNC: 9.7 MG/DL
CHLORIDE SERPL-SCNC: 104 MMOL/L
CO2 SERPL-SCNC: 25 MMOL/L
CREAT SERPL-MCNC: 0.7 MG/DL
ERYTHROCYTE [DISTWIDTH] IN BLOOD BY AUTOMATED COUNT: 16.8 %
EST. GFR  (AFRICAN AMERICAN): >60 ML/MIN/1.73 M^2
EST. GFR  (NON AFRICAN AMERICAN): >60 ML/MIN/1.73 M^2
GLUCOSE SERPL-MCNC: 123 MG/DL
HCT VFR BLD AUTO: 31.6 %
HGB BLD-MCNC: 10.4 G/DL
MCH RBC QN AUTO: 29.9 PG
MCHC RBC AUTO-ENTMCNC: 32.9 %
MCV RBC AUTO: 91 FL
NEUTROPHILS # BLD AUTO: 3.7 K/UL
PLATELET # BLD AUTO: 281 K/UL
PMV BLD AUTO: 9.7 FL
POTASSIUM SERPL-SCNC: 4.7 MMOL/L
PROT SERPL-MCNC: 7.1 G/DL
RBC # BLD AUTO: 3.48 M/UL
SODIUM SERPL-SCNC: 140 MMOL/L
WBC # BLD AUTO: 4.51 K/UL

## 2017-02-16 PROCEDURE — 80053 COMPREHEN METABOLIC PANEL: CPT

## 2017-02-16 PROCEDURE — 36415 COLL VENOUS BLD VENIPUNCTURE: CPT

## 2017-02-16 PROCEDURE — 85027 COMPLETE CBC AUTOMATED: CPT

## 2017-02-17 ENCOUNTER — INFUSION (OUTPATIENT)
Dept: INFUSION THERAPY | Facility: HOSPITAL | Age: 57
End: 2017-02-17
Attending: INTERNAL MEDICINE
Payer: COMMERCIAL

## 2017-02-17 ENCOUNTER — OFFICE VISIT (OUTPATIENT)
Dept: HEMATOLOGY/ONCOLOGY | Facility: CLINIC | Age: 57
End: 2017-02-17
Attending: INTERNAL MEDICINE
Payer: COMMERCIAL

## 2017-02-17 VITALS
RESPIRATION RATE: 18 BRPM | DIASTOLIC BLOOD PRESSURE: 65 MMHG | SYSTOLIC BLOOD PRESSURE: 131 MMHG | HEART RATE: 64 BPM | TEMPERATURE: 98 F

## 2017-02-17 VITALS
OXYGEN SATURATION: 93 % | HEIGHT: 59 IN | DIASTOLIC BLOOD PRESSURE: 58 MMHG | TEMPERATURE: 98 F | SYSTOLIC BLOOD PRESSURE: 126 MMHG | RESPIRATION RATE: 20 BRPM | WEIGHT: 106.06 LBS | BODY MASS INDEX: 21.38 KG/M2 | HEART RATE: 67 BPM

## 2017-02-17 DIAGNOSIS — C25.9 PANCREATIC ADENOCARCINOMA: Primary | ICD-10-CM

## 2017-02-17 DIAGNOSIS — C25.9 PANCREATIC ADENOCARCINOMA: Primary | Chronic | ICD-10-CM

## 2017-02-17 DIAGNOSIS — C78.7 LIVER METASTASES: ICD-10-CM

## 2017-02-17 PROCEDURE — 63600175 PHARM REV CODE 636 W HCPCS: Performed by: INTERNAL MEDICINE

## 2017-02-17 PROCEDURE — 96367 TX/PROPH/DG ADDL SEQ IV INF: CPT

## 2017-02-17 PROCEDURE — 96413 CHEMO IV INFUSION 1 HR: CPT

## 2017-02-17 PROCEDURE — 96417 CHEMO IV INFUS EACH ADDL SEQ: CPT

## 2017-02-17 PROCEDURE — 25000003 PHARM REV CODE 250: Performed by: INTERNAL MEDICINE

## 2017-02-17 PROCEDURE — 99999 PR PBB SHADOW E&M-EST. PATIENT-LVL III: CPT | Mod: PBBFAC,,, | Performed by: INTERNAL MEDICINE

## 2017-02-17 PROCEDURE — 99214 OFFICE O/P EST MOD 30 MIN: CPT | Mod: 25,S$GLB,, | Performed by: INTERNAL MEDICINE

## 2017-02-17 RX ORDER — HEPARIN 100 UNIT/ML
500 SYRINGE INTRAVENOUS
Status: DISCONTINUED | OUTPATIENT
Start: 2017-02-17 | End: 2017-02-17 | Stop reason: HOSPADM

## 2017-02-17 RX ORDER — SODIUM CHLORIDE 0.9 % (FLUSH) 0.9 %
10 SYRINGE (ML) INJECTION
Status: DISCONTINUED | OUTPATIENT
Start: 2017-02-17 | End: 2017-02-17 | Stop reason: HOSPADM

## 2017-02-17 RX ADMIN — PACLITAXEL 180 MG: 100 INJECTION, POWDER, LYOPHILIZED, FOR SUSPENSION INTRAVENOUS at 11:02

## 2017-02-17 RX ADMIN — SODIUM CHLORIDE: 0.9 INJECTION, SOLUTION INTRAVENOUS at 11:02

## 2017-02-17 RX ADMIN — SODIUM CHLORIDE 1410 MG: 0.9 INJECTION, SOLUTION INTRAVENOUS at 12:02

## 2017-02-17 RX ADMIN — GRANISETRON HYDROCHLORIDE 1 MG: 1 INJECTION, SOLUTION INTRAVENOUS at 11:02

## 2017-02-17 NOTE — PLAN OF CARE
Problem: Patient Care Overview  Goal: Plan of Care Review  Outcome: Ongoing (interventions implemented as appropriate)  Pt independently ambulated into chemo infusion clinic today with spouse. Pt skin warm and dry, RR even and unlabored. Pt in NAD and appears comfortable. Pt denies any pain or discomfort and tolerated Abraxane/Gemzar treatment well. Pt denies headache, dizziness or visual disturbances.  Pt reports intermittent nausea, but currently denies nausea and tolerated chips and PO fluids well throughout treatment.  Warm blanket applied to pt R upper extremity while receiving Gemzar, pt tolerated well, but reported minimal pain throughout infusion. Patient follow up discussed and patient verbally expressed understanding.

## 2017-02-17 NOTE — PROGRESS NOTES
Subjective:       Patient ID: Lashay Whitley is a 57 y.o. female.    Chief Complaint: Follow-up    HPI     Returns for cycle # 2, day 8 of chemotherapy  Nausea remains an issue- notes mostly after eating, no vomiting - Has to go lie down for 30 minutes and then fine  Trying scheduled anti-emetic medications and yesterday did better    Breathing better. Reports no cough and less shortness of breath noted.  notes seems more comfortable when sleeping as well.      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   Started on FOLFIRINOX as neoadjuvant therapy and completed 4 cycles.  Started on neoadjuvant chemotherapy and XRT which was completed with some complications.  Underwent a Whipple procedure on 4/12/16.   Pathology was reviewed.   Specimens: Head of pancreas, duodenum, common bile duct, gallbladder  -Procedure: Pancreaticoduodenectomy (Whipple resection), partial pancreatectomy  -Tumor site: Pancreatic head  -Tumor size: 20 mm  -Histologic type: Ductal adenocarcinoma  -Histologic grade: Well-differentiated (grade 1 )  -Microscopic tumor extension: Tumor invades peripancreatic soft tissues  -Margins:  -Margins are negative for invasive carcinoma  -Distance to closest distance to closest retroperitoneal margin is 3 mm  -Distance to closest SMV margin is 1.5 mm  -Distance to closest pancreatic margin is 12 mm  -Treatment effect: Present, residual cancer with tumor regression but more than single cells or rare small groups of  cells (partial response, score 2)  -No lymphovascular invasion  -Perineural invasion is present  -Pathologic staging: ypT3 N1 MX  -Lymph nodes:  -Total number of lymph nodes examined: 25  -Total number of lymph nodes involved: 1  She started adjuvant gemcitabine but this was discontinued because of poor tolerance.  - she recurred with metastatic disease recently and has had rising   EUS with liver  biopsy on 11/22/16 confirmed metastatic disease  CT scans reviewed as well  Bone involvement in T spine required XRT      Also of note she has a history of breast cancer.     Review of Systems   Constitutional: Negative for activity change, appetite change, chills, diaphoresis, fatigue, fever and unexpected weight change.   HENT: Negative for congestion, dental problem, ear pain, hearing loss, mouth sores, nosebleeds, rhinorrhea, tinnitus and trouble swallowing.    Eyes: Negative for redness and visual disturbance.   Respiratory: Positive for shortness of breath. Negative for cough, chest tightness and wheezing.    Cardiovascular: Negative for chest pain, palpitations and leg swelling.   Gastrointestinal: Positive for nausea. Negative for abdominal distention, abdominal pain, blood in stool, constipation, diarrhea and vomiting.   Genitourinary: Negative for decreased urine volume, difficulty urinating, dysuria, frequency and hematuria.   Musculoskeletal: Negative for arthralgias, back pain, gait problem, joint swelling and neck pain.   Skin: Negative for pallor and rash.   Neurological: Negative for dizziness, weakness, light-headedness, numbness and headaches.   Hematological: Negative for adenopathy. Does not bruise/bleed easily.   Psychiatric/Behavioral: Negative for confusion, dysphoric mood and sleep disturbance.       Objective:       ECOG SCORE    0 - Fully active-able to carry on all pre-disease performance without restriction        Physical Exam   Constitutional: She is oriented to person, place, and time. She appears well-developed and well-nourished. No distress.   Presents with her    HENT:   Head: Normocephalic and atraumatic.   Right Ear: External ear normal.   Left Ear: External ear normal.   Nose: Nose normal.   Mouth/Throat: Oropharynx is clear and moist. No oropharyngeal exudate.   Eyes: Conjunctivae and EOM are normal. Pupils are equal, round, and reactive to light. Right eye exhibits no  discharge. Left eye exhibits no discharge. No scleral icterus. Right pupil is round and reactive. Left pupil is round and reactive.   Neck: Normal range of motion. Neck supple. No tracheal deviation present. No thyromegaly present.   Cardiovascular: Normal rate, regular rhythm, normal heart sounds and intact distal pulses.  Exam reveals no gallop and no friction rub.    No murmur heard.  Pulmonary/Chest: Effort normal and breath sounds normal. No respiratory distress. She has no wheezes. She has no rales. She exhibits no tenderness.   Abdominal: Soft. Bowel sounds are normal. She exhibits no distension and no mass. There is no hepatosplenomegaly. There is no tenderness. There is no rebound and no guarding.   No organomegaly   Musculoskeletal: Normal range of motion. She exhibits no edema, tenderness or deformity.   Lymphadenopathy:        Head (right side): No submandibular adenopathy present.        Head (left side): No submandibular adenopathy present.     She has no cervical adenopathy.        Right cervical: No superficial cervical, no deep cervical and no posterior cervical adenopathy present.       Left cervical: No superficial cervical, no deep cervical and no posterior cervical adenopathy present.        Right: No inguinal and no supraclavicular adenopathy present.        Left: No inguinal and no supraclavicular adenopathy present.   Neurological: She is alert and oriented to person, place, and time. She has normal strength. No cranial nerve deficit or sensory deficit. She exhibits normal muscle tone. Coordination normal.   Skin: Skin is warm and dry. No bruising, no lesion, no petechiae and no rash noted. She is not diaphoretic. No erythema. No pallor.   Psychiatric: She has a normal mood and affect. Her behavior is normal. Judgment and thought content normal. Her mood appears not anxious. She does not exhibit a depressed mood.   Nursing note and vitals reviewed.    Labs- reviewed  Assessment:       1.  Pancreatic adenocarcinoma    2. Liver metastases        Plan:     1-2. Proceed with cycle # 2, day 8, counts adequate  Knows to call for any issues  Restaging after cycle 2 complete    We will also discuss next visit if wishes to reschedule genetic counseling appointment

## 2017-02-17 NOTE — PROGRESS NOTES
Presents for cycle # 2 of chemotherapy  Reports continued shortness of breath- worse with exertion- fairly stable  Notes wheezing  Cough after- clear mucosal discharge also noted in AM  antibiotics have improved symptoms      Weight up  Eating better although still nauseated      Back pain improving      She had a recent MRI and question has been raised if pain from metastatic disease  MRI 11/30/16:  Impression      Mild T4 superior endplate compression fracture.  Degree of fracture has slightly increased as compared to previous MR from 10/25/16.  Persistent mild anterior epidural enhancement visualized posterior to the T4 vertebral body.  Potential pathologic fracture not excluded given patient's malignancy history.    Additional indeterminate T1 hypointense foci within the T5, T8, and T9 vertebral bodies, with potential metastatic disease not excluded.          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   Started on FOLFIRINOX as neoadjuvant therapy and completed 4 cycles.  Started on neoadjuvant chemotherapy and XRT which was completed with some complications.  Underwent a Whipple procedure on 4/12/16.   Pathology was reviewed.   Specimens: Head of pancreas, duodenum, common bile duct, gallbladder  -Procedure: Pancreaticoduodenectomy (Whipple resection), partial pancreatectomy  -Tumor site: Pancreatic head  -Tumor size: 20 mm  -Histologic type: Ductal adenocarcinoma  -Histologic grade: Well-differentiated (grade 1 )  -Microscopic tumor extension: Tumor invades peripancreatic soft tissues  -Margins:  -Margins are negative for invasive carcinoma  -Distance to closest distance to closest retroperitoneal margin is 3 mm  -Distance to closest SMV margin is 1.5 mm  -Distance to closest pancreatic margin is 12 mm  -Treatment effect: Present, residual cancer with tumor regression but more than single cells or rare small groups of  cells (partial response, score 2)  -No  lymphovascular invasion  -Perineural invasion is present  -Pathologic staging: ypT3 N1 MX  -Lymph nodes:  -Total number of lymph nodes examined: 25  -Total number of lymph nodes involved: 1  She started adjuvant gemcitabine but this was discontinued because of poor tolerance.  - she recurred with metastatic disease recently and has had rising   EUS with liver biopsy on 11/22/16 confirmed metastatic disease  CT scans reviewed as well  Bone involvement in T spine required XRT      Also of note she has a history of breast cancer.

## 2017-02-20 ENCOUNTER — TELEPHONE (OUTPATIENT)
Dept: HEMATOLOGY/ONCOLOGY | Facility: CLINIC | Age: 57
End: 2017-02-20

## 2017-02-20 NOTE — TELEPHONE ENCOUNTER
Returned call to pt.    answered phone.    put on hold on phone transferred to Dr. Nguyễn.       ----- Message from Christine Mathew sent at 2/20/2017  3:07 PM CST -----  Contact: Spouse  Would like Orin to call him, did not state specific details.     [phone]

## 2017-02-21 ENCOUNTER — TELEPHONE (OUTPATIENT)
Dept: HEMATOLOGY/ONCOLOGY | Facility: CLINIC | Age: 57
End: 2017-02-21

## 2017-02-21 ENCOUNTER — HOSPITAL ENCOUNTER (OUTPATIENT)
Dept: RADIOLOGY | Facility: HOSPITAL | Age: 57
Discharge: HOME | End: 2017-02-21
Attending: INTERNAL MEDICINE
Payer: COMMERCIAL

## 2017-02-21 ENCOUNTER — OFFICE VISIT (OUTPATIENT)
Dept: HEMATOLOGY/ONCOLOGY | Facility: CLINIC | Age: 57
End: 2017-02-21
Payer: COMMERCIAL

## 2017-02-21 ENCOUNTER — TELEPHONE (OUTPATIENT)
Dept: ADMINISTRATIVE | Facility: OTHER | Age: 57
End: 2017-02-21

## 2017-02-21 VITALS
HEART RATE: 81 BPM | WEIGHT: 107.13 LBS | TEMPERATURE: 98 F | DIASTOLIC BLOOD PRESSURE: 59 MMHG | RESPIRATION RATE: 16 BRPM | SYSTOLIC BLOOD PRESSURE: 107 MMHG | BODY MASS INDEX: 21.64 KG/M2

## 2017-02-21 DIAGNOSIS — R60.9 EDEMA, UNSPECIFIED TYPE: ICD-10-CM

## 2017-02-21 DIAGNOSIS — C79.9 METASTASIS FROM PANCREATIC CANCER: Primary | ICD-10-CM

## 2017-02-21 DIAGNOSIS — C25.9 METASTASIS FROM PANCREATIC CANCER: Primary | ICD-10-CM

## 2017-02-21 DIAGNOSIS — C25.9 PANCREATIC ADENOCARCINOMA: Chronic | ICD-10-CM

## 2017-02-21 DIAGNOSIS — L03.90 CELLULITIS, UNSPECIFIED CELLULITIS SITE: ICD-10-CM

## 2017-02-21 DIAGNOSIS — C78.7 LIVER METASTASES: ICD-10-CM

## 2017-02-21 PROCEDURE — 99214 OFFICE O/P EST MOD 30 MIN: CPT | Mod: S$GLB,,, | Performed by: PHYSICIAN ASSISTANT

## 2017-02-21 PROCEDURE — 93970 EXTREMITY STUDY: CPT | Mod: TC

## 2017-02-21 PROCEDURE — 99999 PR PBB SHADOW E&M-EST. PATIENT-LVL III: CPT | Mod: PBBFAC,,, | Performed by: PHYSICIAN ASSISTANT

## 2017-02-21 PROCEDURE — 93970 EXTREMITY STUDY: CPT | Mod: 26,,, | Performed by: RADIOLOGY

## 2017-02-21 RX ORDER — DOXYCYCLINE 100 MG/1
100 CAPSULE ORAL 2 TIMES DAILY
Qty: 20 CAPSULE | Refills: 0 | Status: SHIPPED | OUTPATIENT
Start: 2017-02-21 | End: 2017-03-08

## 2017-02-21 RX ORDER — FUROSEMIDE 20 MG/1
20 TABLET ORAL EVERY OTHER DAY
Qty: 30 TABLET | Refills: 1 | Status: SHIPPED | OUTPATIENT
Start: 2017-02-21 | End: 2017-03-08

## 2017-02-21 NOTE — PROGRESS NOTES
"Subjective:       Patient ID: Lashay Whitley is a 57 y.o. female.    Chief Complaint: pancreatic adenocarcinoma    HPI Comments: Dr. Nguyễn's patient    57 year old female with metastatic pancreatic cancer, currently receiving Norcatur/Abraxane. C2D8 given on 2/17/17.    Patient states that the day after receiving chemotherapy she felt profoundly more tired than with past chemotherapy cycles of the same regimen with some associated chest heaviness/fatigue.  No sharp chest pain.   On day 2 following chemo she woke up and those symptoms had resolved and patient "felt normal".  She did not have any associated fever, chills, nausea or vomiting. She has had continued bilateral lower extremity edema, which worsened acutely over the past two days, left greater than right. She has been taking 20 mg lasix for that issue but is out of medication. In addition the left foot has developed some redness and tenderness to palpation.  Edema resolves with elevation.  No pleuritic pain. No labored breathing..  Reports left sided abdominal pain on palpation present for the last several weeks.       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   Started on FOLFIRINOX as neoadjuvant therapy and completed 4 cycles.  Started on neoadjuvant chemotherapy and XRT which was completed with some complications.  Underwent a Whipple procedure on 4/12/16.   Pathology was reviewed.   Specimens: Head of pancreas, duodenum, common bile duct, gallbladder  -Procedure: Pancreaticoduodenectomy (Whipple resection), partial pancreatectomy  -Tumor site: Pancreatic head  -Tumor size: 20 mm  -Histologic type: Ductal adenocarcinoma  -Histologic grade: Well-differentiated (grade 1 )  -Microscopic tumor extension: Tumor invades peripancreatic soft tissues  -Margins:  -Margins are negative for invasive carcinoma  -Distance to closest distance to closest retroperitoneal margin is 3 mm  -Distance to closest SMV margin is 1.5 " mm  -Distance to closest pancreatic margin is 12 mm  -Treatment effect: Present, residual cancer with tumor regression but more than single cells or rare small groups of  cells (partial response, score 2)  -No lymphovascular invasion  -Perineural invasion is present  -Pathologic staging: ypT3 N1 MX  -Lymph nodes:  -Total number of lymph nodes examined: 25  -Total number of lymph nodes involved: 1  She started adjuvant gemcitabine but this was discontinued because of poor tolerance.  - she recurred with metastatic disease recently and has had rising   EUS with liver biopsy on 11/22/16 confirmed metastatic disease  CT scans reviewed as well  Bone involvement in T spine required XRT      Also of note she has a history of breast cancer    Review of Systems   Constitutional: Positive for activity change (secondary to pain at feet and can't walk on them due to swelling/discomfort) and fatigue. Negative for appetite change, chills, diaphoresis, fever and unexpected weight change.   HENT: Negative for congestion, mouth sores, rhinorrhea and trouble swallowing.    Respiratory: Negative for cough, chest tightness and shortness of breath.    Cardiovascular: Positive for leg swelling. Negative for chest pain and palpitations.   Gastrointestinal: Positive for abdominal pain (ruq tenderness). Negative for constipation, diarrhea, nausea and vomiting.   Genitourinary: Negative for dysuria and hematuria.   Musculoskeletal: Positive for back pain. Negative for arthralgias, joint swelling, myalgias, neck pain and neck stiffness.   Skin: Positive for color change (erythema at left foot).   Neurological: Negative for dizziness, weakness, numbness and headaches.   Hematological: Negative for adenopathy. Does not bruise/bleed easily.   Psychiatric/Behavioral: Negative for dysphoric mood. The patient is not nervous/anxious.        Objective:      Physical Exam   Constitutional: She is oriented to person, place, and time. She appears  well-developed and well-nourished. No distress.   Presents with   ECOG 1  02 sat 99% on room air   HENT:   Head: Normocephalic.   Mouth/Throat: Oropharynx is clear and moist. No oropharyngeal exudate.   Eyes: Pupils are equal, round, and reactive to light. No scleral icterus.   Neck: Normal range of motion. Neck supple. No thyromegaly present.   Pulmonary/Chest: Effort normal and breath sounds normal. No respiratory distress. She has no wheezes. She has no rales. She exhibits no tenderness.   Lungs clear to auscultation bilaterally  No respiratory distress     Abdominal: Soft. Bowel sounds are normal. She exhibits no distension and no mass. There is no rebound and no guarding.   No ascites  + tenderness to palpation at left aspect of abdomen, no palpable mass     Musculoskeletal: Normal range of motion. She exhibits edema and tenderness (tenderness to palpation at bilateral lower extremities). She exhibits no deformity.   Edema at bilateral lower extremities to level of mid-calf, Left > right   Lymphadenopathy:     She has no cervical adenopathy.   Neurological: She is alert and oriented to person, place, and time. No cranial nerve deficit.   Skin: Skin is warm and dry. No rash noted. There is erythema.   Psychiatric: She has a normal mood and affect. Her behavior is normal. Judgment and thought content normal.   Vitals reviewed.      Assessment:       1. Pancreatic adenocarcinoma    2. Liver metastases    3. Edema, unspecified type    4. Cellulitis, unspecified cellulitis site        Plan:    1-2) Return on 2/23 for labs in anticipation of C2D15 gem/abraxane on 2/24.  Per Dr. Nguyễn's last note will plan re-imaging after that cycle.  Will assess stomach discomfort with those scans.  3) Edema and erythema concerning for DVT, however bilateral lower extremity US was negative.  Patient given refill on lasix 20 mg and will re-assess patient on 2/23. Patient advised to elevate legs.   4)Will treat for presumed  cellulitis given erythema/warmth on physical exam; RX for  Doxycycline sent to pharmacy.    *request for wheelchair submitted to social work with paperwork/order entered    Patient knows to call in interim if any issues. Edema likely side effect of treatment.

## 2017-02-21 NOTE — Clinical Note
Can you change her labs on 2/23 from [place] to here? I want to eyeball her that day. All other appts remain the same

## 2017-02-21 NOTE — Clinical Note
Jean-Pierre brito- I put in orders for this patient for a wheelchair, would you be able to help me with that in terms of running it through insurance? Thanks!

## 2017-02-21 NOTE — TELEPHONE ENCOUNTER
----- Message from Michaela Hoffmann PA-C sent at 2/21/2017  3:14 PM CST -----  Can you change her labs on 2/23 from [place] to here? I want to eyeball her that day. All other appts remain the same

## 2017-02-22 ENCOUNTER — TELEPHONE (OUTPATIENT)
Dept: HEMATOLOGY/ONCOLOGY | Facility: CLINIC | Age: 57
End: 2017-02-22

## 2017-02-22 NOTE — TELEPHONE ENCOUNTER
Returned call to patient.   Informed her spoke with [facility] this morning and they will cover generic form and she can  today.   Patient verbalized understanding.   Patient voiced new symptoms---increased nausea and dizziness but stated she sees Michaela tomorrow and will update her then.         ----- Message from Jade Rudd sent at 2/22/2017 10:54 AM CST -----  Contact: self  Pt is calling regarding her antibiotic is not covered under her insurance.  Please call the pt regarding this.      Contact number  [phone]

## 2017-02-23 ENCOUNTER — DOCUMENTATION ONLY (OUTPATIENT)
Dept: HEMATOLOGY/ONCOLOGY | Facility: CLINIC | Age: 57
End: 2017-02-23

## 2017-02-23 ENCOUNTER — INFUSION (OUTPATIENT)
Dept: INFUSION THERAPY | Facility: HOSPITAL | Age: 57
End: 2017-02-23
Attending: INTERNAL MEDICINE
Payer: COMMERCIAL

## 2017-02-23 DIAGNOSIS — T45.1X5A CHEMOTHERAPY INDUCED NEUTROPENIA: Primary | ICD-10-CM

## 2017-02-23 DIAGNOSIS — D70.1 CHEMOTHERAPY INDUCED NEUTROPENIA: Primary | ICD-10-CM

## 2017-02-23 PROCEDURE — 96372 THER/PROPH/DIAG INJ SC/IM: CPT

## 2017-02-23 PROCEDURE — 63600175 PHARM REV CODE 636 W HCPCS: Performed by: INTERNAL MEDICINE

## 2017-02-23 RX ADMIN — FILGRASTIM 480 MCG: 480 INJECTION, SOLUTION INTRAVENOUS; SUBCUTANEOUS at 01:02

## 2017-02-23 NOTE — NURSING
1344 -- Patient received Neupogen in ABD.  Tolerated well.  Assisted off unit via w/c accompanied by spouse.

## 2017-02-24 ENCOUNTER — INFUSION (OUTPATIENT)
Dept: INFUSION THERAPY | Facility: HOSPITAL | Age: 57
End: 2017-02-24
Attending: INTERNAL MEDICINE
Payer: COMMERCIAL

## 2017-02-24 ENCOUNTER — TELEPHONE (OUTPATIENT)
Dept: ADMINISTRATIVE | Facility: OTHER | Age: 57
End: 2017-02-24

## 2017-02-24 DIAGNOSIS — R60.9 EDEMA, UNSPECIFIED TYPE: Primary | ICD-10-CM

## 2017-02-24 DIAGNOSIS — T45.1X5A CHEMOTHERAPY INDUCED NEUTROPENIA: Primary | ICD-10-CM

## 2017-02-24 DIAGNOSIS — D70.1 CHEMOTHERAPY INDUCED NEUTROPENIA: Primary | ICD-10-CM

## 2017-02-24 DIAGNOSIS — C25.9 PANCREATIC ADENOCARCINOMA: Primary | Chronic | ICD-10-CM

## 2017-02-24 PROCEDURE — 96372 THER/PROPH/DIAG INJ SC/IM: CPT

## 2017-02-24 PROCEDURE — 63600175 PHARM REV CODE 636 W HCPCS: Performed by: PHYSICIAN ASSISTANT

## 2017-02-24 RX ORDER — FUROSEMIDE 40 MG/1
40 TABLET ORAL 2 TIMES DAILY
Qty: 60 TABLET | Refills: 1 | Status: SHIPPED | OUTPATIENT
Start: 2017-02-24 | End: 2017-04-30 | Stop reason: SDUPTHER

## 2017-02-24 RX ADMIN — FILGRASTIM 480 MCG: 480 INJECTION, SOLUTION INTRAVENOUS; SUBCUTANEOUS at 01:02

## 2017-02-24 NOTE — TELEPHONE ENCOUNTER
----- Message from Michaela Hoffmann PA-C sent at 2/24/2017  9:31 AM CST -----  Let's schedule restaging scans for her the later part of next week and she can see Dr. Nguyễn early the week of 3/6 with labs?  ZL- if this time frame doesn't work with you just let palmira know and we'll adjust accordingly.

## 2017-02-24 NOTE — Clinical Note
Let's schedule restaging scans for her the later part of next week and she can see Dr. Nguyễn early the week of 3/6 with labs? ZL- if this time frame doesn't work with you just let palmira know and we'll adjust accordingly.

## 2017-02-24 NOTE — PROGRESS NOTES
Subjective:       Patient ID: Lashay Whitlye is a 57 y.o. female.    Chief Complaint: No chief complaint on file.    HPI  Review of Systems    Objective:      Physical Exam    Assessment:       No diagnosis found.    Plan:       ***

## 2017-02-24 NOTE — NURSING
Pt arrived with  for neupogen #2/3.  Pt tolerated injection to abd.  Discharged to home and will RTC tomorrow for injection

## 2017-02-24 NOTE — PROGRESS NOTES
Patient's chemo to be held on 2/24 due to treatment related neutropenia.  Neutropenic precautions discussed.    She continues with bilateral lower extremity edema. Erythema at left foot has improved slightly. US was negative.  Patient will remain on doxycycline. No other infectious signs/symptoms and she knows to call if fever. Neuopgen X 3 days starting 2/23.  Return to clinic in 1 week with restaging scans.

## 2017-02-25 ENCOUNTER — INFUSION (OUTPATIENT)
Dept: INFUSION THERAPY | Facility: HOSPITAL | Age: 57
End: 2017-02-25
Attending: INTERNAL MEDICINE
Payer: COMMERCIAL

## 2017-02-25 DIAGNOSIS — T45.1X5A CHEMOTHERAPY INDUCED NEUTROPENIA: Primary | ICD-10-CM

## 2017-02-25 DIAGNOSIS — D70.1 CHEMOTHERAPY INDUCED NEUTROPENIA: Primary | ICD-10-CM

## 2017-02-25 PROCEDURE — 63600175 PHARM REV CODE 636 W HCPCS: Performed by: PHYSICIAN ASSISTANT

## 2017-02-25 PROCEDURE — 96372 THER/PROPH/DIAG INJ SC/IM: CPT

## 2017-02-25 RX ADMIN — FILGRASTIM 480 MCG: 480 INJECTION, SOLUTION INTRAVENOUS; SUBCUTANEOUS at 09:02

## 2017-02-25 NOTE — NURSING
Pt arrived for neulasta injection. Denies any problems. Verbalized understanding of when to call MD. Gaming ambulating indep.

## 2017-02-26 ENCOUNTER — PATIENT MESSAGE (OUTPATIENT)
Dept: HEMATOLOGY/ONCOLOGY | Facility: CLINIC | Age: 57
End: 2017-02-26

## 2017-02-27 ENCOUNTER — HOSPITAL ENCOUNTER (OUTPATIENT)
Dept: RADIOLOGY | Facility: HOSPITAL | Age: 57
Discharge: HOME | End: 2017-02-27
Attending: INTERNAL MEDICINE
Payer: COMMERCIAL

## 2017-02-27 ENCOUNTER — TELEPHONE (OUTPATIENT)
Dept: HEMATOLOGY/ONCOLOGY | Facility: CLINIC | Age: 57
End: 2017-02-27

## 2017-02-27 DIAGNOSIS — C25.9 PANCREATIC ADENOCARCINOMA: Chronic | ICD-10-CM

## 2017-02-27 PROCEDURE — 74177 CT ABD & PELVIS W/CONTRAST: CPT | Mod: TC

## 2017-02-27 PROCEDURE — 25500020 PHARM REV CODE 255: Performed by: INTERNAL MEDICINE

## 2017-02-27 PROCEDURE — 71260 CT THORAX DX C+: CPT | Mod: 26,,, | Performed by: RADIOLOGY

## 2017-02-27 PROCEDURE — 71260 CT THORAX DX C+: CPT | Mod: TC

## 2017-02-27 PROCEDURE — 74177 CT ABD & PELVIS W/CONTRAST: CPT | Mod: 26,,, | Performed by: RADIOLOGY

## 2017-02-27 RX ADMIN — IOHEXOL 75 ML: 350 INJECTION, SOLUTION INTRAVENOUS at 12:02

## 2017-02-27 RX ADMIN — IOHEXOL 30 ML: 350 INJECTION, SOLUTION INTRAVENOUS at 10:02

## 2017-02-27 NOTE — TELEPHONE ENCOUNTER
Returned call to patient.   Informed her per Dr. Nguyễn okay to proceed with CT scans as scheduled.   Patient questioning about when full body scan and echo will be done as discussed by Dr. Nguyễn on Friday when she stopped by for a quick eval.   I informed her I saw no notation regarding this; therefore, I would check with Dr. Nguyễn to see if still wanted scheduled.   Patient verbalized understanding.     Message routed to Dr. Nguyễn.         ----- Message from James Polo sent at 2/27/2017  8:23 AM CST -----  Contact: self  Pt states her ct-scan are suppose to full body head to toe, pt is calling to check status of right scans.  Contact number [phone]

## 2017-03-01 ENCOUNTER — PATIENT MESSAGE (OUTPATIENT)
Dept: DIABETES | Facility: CLINIC | Age: 57
End: 2017-03-01

## 2017-03-01 DIAGNOSIS — R51.9 PERSISTENT HEADACHES: Primary | ICD-10-CM

## 2017-03-01 DIAGNOSIS — J90 PLEURAL EFFUSION: Primary | ICD-10-CM

## 2017-03-01 DIAGNOSIS — R52 PAIN: ICD-10-CM

## 2017-03-02 ENCOUNTER — TELEPHONE (OUTPATIENT)
Dept: HEMATOLOGY/ONCOLOGY | Facility: CLINIC | Age: 57
End: 2017-03-02

## 2017-03-02 ENCOUNTER — DOCUMENTATION ONLY (OUTPATIENT)
Dept: CARDIOTHORACIC SURGERY | Facility: CLINIC | Age: 57
End: 2017-03-02

## 2017-03-02 NOTE — TELEPHONE ENCOUNTER
returned patient's call.   left a voice message.  Patient has been scheduled with Dr. Deluna in thoracic clinic on 3/8 at 10:15am  Patient was asked to callback Dr. Nguyễn's office to confirmed appointment.

## 2017-03-02 NOTE — PROGRESS NOTES
"Received a referral from Dr. Nguyễn re: bilateral pleural effusions noted on CT chest of 2/27. Pt has metastatic pancreatic cancer (liver mets) and is undergoing chemotherapy. S/p whipple on 4/12/16.   CXR PA/LAT obtained on 2/10/17 which noted "Small moderate right pleural effusion and trace left pleural effusion, unchanged, with adjacent dependent airspace disease."  Restaging CT chest/abd/pelvis performed on 2/27/17 revealed "Interval development of moderate bilateral pleural effusions, which appears partially loculated on the right.  Mild scattered airspace opacities seen with significant volume loss and compressive atelectasis within the lower lobes.  Difficult to exclude potential underlying lesion."  Ina called and left the pt a VM with appt for Dr. Deluna on 3/8 at 1015a. Will await confirmation of the appt.  "

## 2017-03-05 ENCOUNTER — HOSPITAL ENCOUNTER (OUTPATIENT)
Dept: RADIOLOGY | Facility: HOSPITAL | Age: 57
Discharge: HOME | End: 2017-03-05
Attending: INTERNAL MEDICINE
Payer: COMMERCIAL

## 2017-03-05 DIAGNOSIS — R51.9 PERSISTENT HEADACHES: ICD-10-CM

## 2017-03-05 DIAGNOSIS — R52 PAIN: ICD-10-CM

## 2017-03-05 PROCEDURE — 70553 MRI BRAIN STEM W/O & W/DYE: CPT | Mod: TC

## 2017-03-05 PROCEDURE — 25500020 PHARM REV CODE 255: Performed by: INTERNAL MEDICINE

## 2017-03-05 PROCEDURE — 72156 MRI NECK SPINE W/O & W/DYE: CPT | Mod: 26,,, | Performed by: RADIOLOGY

## 2017-03-05 PROCEDURE — A9585 GADOBUTROL INJECTION: HCPCS | Performed by: INTERNAL MEDICINE

## 2017-03-05 PROCEDURE — 72157 MRI CHEST SPINE W/O & W/DYE: CPT | Mod: 26,,, | Performed by: RADIOLOGY

## 2017-03-05 PROCEDURE — 72157 MRI CHEST SPINE W/O & W/DYE: CPT | Mod: TC

## 2017-03-05 PROCEDURE — 72156 MRI NECK SPINE W/O & W/DYE: CPT | Mod: TC

## 2017-03-05 PROCEDURE — 70553 MRI BRAIN STEM W/O & W/DYE: CPT | Mod: 26,,, | Performed by: RADIOLOGY

## 2017-03-05 RX ORDER — GADOBUTROL 604.72 MG/ML
5 INJECTION INTRAVENOUS
Status: COMPLETED | OUTPATIENT
Start: 2017-03-05 | End: 2017-03-05

## 2017-03-05 RX ADMIN — GADOBUTROL 5 ML: 604.72 INJECTION INTRAVENOUS at 02:03

## 2017-03-06 ENCOUNTER — TELEPHONE (OUTPATIENT)
Dept: CARDIOTHORACIC SURGERY | Facility: CLINIC | Age: 57
End: 2017-03-06

## 2017-03-06 DIAGNOSIS — E11.65 TYPE 2 DIABETES MELLITUS WITH HYPERGLYCEMIA, WITHOUT LONG-TERM CURRENT USE OF INSULIN: ICD-10-CM

## 2017-03-06 PROBLEM — C79.51 BONE METASTASES: Status: ACTIVE | Noted: 2017-03-06

## 2017-03-06 NOTE — TELEPHONE ENCOUNTER
----- Message from Stephanie Garay sent at 3/6/2017  8:22 AM CST -----  Contact: self/ [phone]  RX request - refill or new RX.  Is this a refill or new RX:    RX name and strength: insulin detemir (LEVEMIR FLEXTOUCH) 100 unit/mL (3 mL) SubQ InPn pen  Directions:   Is this a 30 day or 90 day RX:    Pharmacy name and phone #:  [facility] [address] [place] [address] - [address] [facility] [phone] (Phone)  [phone] (Fax)      Comments:

## 2017-03-06 NOTE — TELEPHONE ENCOUNTER
Called and spoke with the pt, confirmed time for Dr. Deluna's appt on Wed 3/8 at 1015. She is agreeable.

## 2017-03-07 ENCOUNTER — TELEPHONE (OUTPATIENT)
Dept: INTERNAL MEDICINE | Facility: CLINIC | Age: 57
End: 2017-03-07

## 2017-03-07 ENCOUNTER — PATIENT MESSAGE (OUTPATIENT)
Dept: INTERNAL MEDICINE | Facility: CLINIC | Age: 57
End: 2017-03-07

## 2017-03-07 ENCOUNTER — PATIENT MESSAGE (OUTPATIENT)
Dept: DIABETES | Facility: CLINIC | Age: 57
End: 2017-03-07

## 2017-03-07 DIAGNOSIS — E11.9 TYPE 2 DIABETES MELLITUS WITHOUT COMPLICATION, WITHOUT LONG-TERM CURRENT USE OF INSULIN: Primary | ICD-10-CM

## 2017-03-07 RX ORDER — INSULIN GLARGINE 300 [IU]/ML
4 INJECTION, SOLUTION SUBCUTANEOUS 2 TIMES DAILY
Qty: 1 SYRINGE | Refills: 12 | Status: SHIPPED | OUTPATIENT
Start: 2017-03-07

## 2017-03-07 NOTE — TELEPHONE ENCOUNTER
Ramona- thanks for responding    I have not prescribed this    What would be the dose of Toujeo?      She is on Levemir 5 units 2 x daily

## 2017-03-07 NOTE — TELEPHONE ENCOUNTER
Client returned call  I informed her of the medication change per Dr Kelsey from [facility] to [facility] per her insurance request   Client was also given dosage instructions also advised of follow up appt  As well as 3 month labs

## 2017-03-07 NOTE — TELEPHONE ENCOUNTER
----- Message from Michael Dyer sent at 3/7/2017 11:58 AM CST -----  Contact: self/ [phone] cell  Pt is calling to check the status of the request made for the Levemir flextouch.  She is completely out and would like to request that it be sent in today, if possible.  Please call and advise.    Thank you

## 2017-03-07 NOTE — TELEPHONE ENCOUNTER
Please let the patient know that I sent in a new prescription for a different insulin which apparently is covered by her insurance.  I am changing it from the Levemir to the Toujeo.  I would like for her to take 4 units only twice daily.    Please ask her if she wants to come in to see me or is she following in the diabetes clinic for her diabetes.    If she would like to see me please schedule this, otherwise please have her touch base with Adela Maharaj- she will need diabetes labs in 3 months (I ordered ) thanks

## 2017-03-07 NOTE — TELEPHONE ENCOUNTER
Adela-    Somehow, I have been writing her prescriptions but I have really not seen her for her diabetes and it looks like you have been pretty involved in her care.  Can you recommend the best option for her instead of the Levemir Flex touch- do you prefer the Lantus or the Toujeo?    Thanks    April Kelsey

## 2017-03-07 NOTE — TELEPHONE ENCOUNTER
Insurance company faxed over form client needs to try Lantus or Toujeo first before they will cover Levemir Flex touch pen    Please advise

## 2017-03-08 ENCOUNTER — OFFICE VISIT (OUTPATIENT)
Dept: CARDIOTHORACIC SURGERY | Facility: CLINIC | Age: 57
End: 2017-03-08
Payer: COMMERCIAL

## 2017-03-08 ENCOUNTER — HOSPITAL ENCOUNTER (INPATIENT)
Facility: HOSPITAL | Age: 57
LOS: 2 days | Discharge: HOME | DRG: 281 | End: 2017-03-10
Attending: EMERGENCY MEDICINE | Admitting: HOSPITALIST
Payer: COMMERCIAL

## 2017-03-08 VITALS
BODY MASS INDEX: 21.17 KG/M2 | OXYGEN SATURATION: 99 % | WEIGHT: 105 LBS | DIASTOLIC BLOOD PRESSURE: 71 MMHG | HEIGHT: 59 IN | HEART RATE: 65 BPM | SYSTOLIC BLOOD PRESSURE: 97 MMHG

## 2017-03-08 DIAGNOSIS — J90 PLEURAL EFFUSION, LEFT: ICD-10-CM

## 2017-03-08 DIAGNOSIS — I21.4 NSTEMI (NON-ST ELEVATED MYOCARDIAL INFARCTION): ICD-10-CM

## 2017-03-08 DIAGNOSIS — Z01.818 PREOP TESTING: Primary | ICD-10-CM

## 2017-03-08 DIAGNOSIS — R07.9 CHEST PAIN: ICD-10-CM

## 2017-03-08 DIAGNOSIS — R91.1 LUNG NODULE: ICD-10-CM

## 2017-03-08 DIAGNOSIS — J90 PLEURAL EFFUSION ON RIGHT: ICD-10-CM

## 2017-03-08 LAB
ALBUMIN SERPL BCP-MCNC: 3.7 G/DL
ALP SERPL-CCNC: 112 U/L
ALT SERPL W/O P-5'-P-CCNC: 21 U/L
ANION GAP SERPL CALC-SCNC: 11 MMOL/L
APTT BLDCRRT: 24.4 SEC
AST SERPL-CCNC: 26 U/L
BASOPHILS # BLD AUTO: 0.06 K/UL
BASOPHILS NFR BLD: 1.1 %
BILIRUB SERPL-MCNC: 0.4 MG/DL
BNP SERPL-MCNC: 39 PG/ML
BUN SERPL-MCNC: 11 MG/DL
CALCIUM SERPL-MCNC: 9.5 MG/DL
CHLORIDE SERPL-SCNC: 105 MMOL/L
CO2 SERPL-SCNC: 22 MMOL/L
CREAT SERPL-MCNC: 0.8 MG/DL
DIFFERENTIAL METHOD: ABNORMAL
EOSINOPHIL # BLD AUTO: 0.1 K/UL
EOSINOPHIL NFR BLD: 2.1 %
ERYTHROCYTE [DISTWIDTH] IN BLOOD BY AUTOMATED COUNT: 18.9 %
EST. GFR  (AFRICAN AMERICAN): >60 ML/MIN/1.73 M^2
EST. GFR  (NON AFRICAN AMERICAN): >60 ML/MIN/1.73 M^2
FACT X PPP CHRO-ACNC: 0.27 IU/ML
GLUCOSE SERPL-MCNC: 127 MG/DL
HCT VFR BLD AUTO: 36.4 %
HGB BLD-MCNC: 11.8 G/DL
INR PPP: 1
LYMPHOCYTES # BLD AUTO: 1 K/UL
LYMPHOCYTES NFR BLD: 18.2 %
MCH RBC QN AUTO: 31.5 PG
MCHC RBC AUTO-ENTMCNC: 32.4 %
MCV RBC AUTO: 97 FL
MONOCYTES # BLD AUTO: 0.4 K/UL
MONOCYTES NFR BLD: 7.1 %
NEUTROPHILS # BLD AUTO: 4 K/UL
NEUTROPHILS NFR BLD: 70.8 %
PLATELET # BLD AUTO: 367 K/UL
PMV BLD AUTO: 10 FL
POCT GLUCOSE: 241 MG/DL (ref 70–110)
POTASSIUM SERPL-SCNC: 3.9 MMOL/L
PROT SERPL-MCNC: 7.5 G/DL
PROTHROMBIN TIME: 10.3 SEC
RBC # BLD AUTO: 3.75 M/UL
SODIUM SERPL-SCNC: 138 MMOL/L
T4 FREE SERPL-MCNC: 1.19 NG/DL
TROPONIN I SERPL DL<=0.01 NG/ML-MCNC: 0.05 NG/ML
TROPONIN I SERPL DL<=0.01 NG/ML-MCNC: 1.19 NG/ML
TSH SERPL DL<=0.005 MIU/L-ACNC: 5.61 UIU/ML
WBC # BLD AUTO: 5.66 K/UL

## 2017-03-08 PROCEDURE — 85025 COMPLETE CBC W/AUTO DIFF WBC: CPT

## 2017-03-08 PROCEDURE — 84443 ASSAY THYROID STIM HORMONE: CPT

## 2017-03-08 PROCEDURE — 63600175 PHARM REV CODE 636 W HCPCS: Performed by: HOSPITALIST

## 2017-03-08 PROCEDURE — 25000003 PHARM REV CODE 250: Performed by: STUDENT IN AN ORGANIZED HEALTH CARE EDUCATION/TRAINING PROGRAM

## 2017-03-08 PROCEDURE — 84439 ASSAY OF FREE THYROXINE: CPT

## 2017-03-08 PROCEDURE — 93005 ELECTROCARDIOGRAM TRACING: CPT

## 2017-03-08 PROCEDURE — 85610 PROTHROMBIN TIME: CPT

## 2017-03-08 PROCEDURE — 85520 HEPARIN ASSAY: CPT

## 2017-03-08 PROCEDURE — 83880 ASSAY OF NATRIURETIC PEPTIDE: CPT

## 2017-03-08 PROCEDURE — 99285 EMERGENCY DEPT VISIT HI MDM: CPT | Mod: ,,, | Performed by: EMERGENCY MEDICINE

## 2017-03-08 PROCEDURE — 96365 THER/PROPH/DIAG IV INF INIT: CPT

## 2017-03-08 PROCEDURE — 93010 ELECTROCARDIOGRAM REPORT: CPT | Mod: ,,, | Performed by: INTERNAL MEDICINE

## 2017-03-08 PROCEDURE — 80053 COMPREHEN METABOLIC PANEL: CPT

## 2017-03-08 PROCEDURE — 96366 THER/PROPH/DIAG IV INF ADDON: CPT

## 2017-03-08 PROCEDURE — 84484 ASSAY OF TROPONIN QUANT: CPT | Mod: 91

## 2017-03-08 PROCEDURE — 96372 THER/PROPH/DIAG INJ SC/IM: CPT

## 2017-03-08 PROCEDURE — 25000003 PHARM REV CODE 250: Performed by: EMERGENCY MEDICINE

## 2017-03-08 PROCEDURE — 82962 GLUCOSE BLOOD TEST: CPT

## 2017-03-08 PROCEDURE — 99999 PR PBB SHADOW E&M-EST. PATIENT-LVL IV: CPT | Mod: PBBFAC,,, | Performed by: THORACIC SURGERY (CARDIOTHORACIC VASCULAR SURGERY)

## 2017-03-08 PROCEDURE — 25000003 PHARM REV CODE 250: Performed by: HOSPITALIST

## 2017-03-08 PROCEDURE — 12000002 HC ACUTE/MED SURGE SEMI-PRIVATE ROOM

## 2017-03-08 PROCEDURE — 84484 ASSAY OF TROPONIN QUANT: CPT

## 2017-03-08 PROCEDURE — 99204 OFFICE O/P NEW MOD 45 MIN: CPT | Mod: S$GLB,,, | Performed by: THORACIC SURGERY (CARDIOTHORACIC VASCULAR SURGERY)

## 2017-03-08 PROCEDURE — 85730 THROMBOPLASTIN TIME PARTIAL: CPT

## 2017-03-08 PROCEDURE — 99285 EMERGENCY DEPT VISIT HI MDM: CPT | Mod: 25

## 2017-03-08 PROCEDURE — 96361 HYDRATE IV INFUSION ADD-ON: CPT

## 2017-03-08 PROCEDURE — 99223 1ST HOSP IP/OBS HIGH 75: CPT | Mod: ,,, | Performed by: HOSPITALIST

## 2017-03-08 PROCEDURE — 93010 ELECTROCARDIOGRAM REPORT: CPT | Mod: 76,,, | Performed by: INTERNAL MEDICINE

## 2017-03-08 RX ORDER — GABAPENTIN 300 MG/1
300 CAPSULE ORAL 3 TIMES DAILY
Status: DISCONTINUED | OUTPATIENT
Start: 2017-03-08 | End: 2017-03-10 | Stop reason: HOSPADM

## 2017-03-08 RX ORDER — INSULIN ASPART 100 [IU]/ML
0-5 INJECTION, SOLUTION INTRAVENOUS; SUBCUTANEOUS
Status: DISCONTINUED | OUTPATIENT
Start: 2017-03-08 | End: 2017-03-10 | Stop reason: HOSPADM

## 2017-03-08 RX ORDER — CLOPIDOGREL BISULFATE 75 MG/1
75 TABLET ORAL DAILY
Status: DISCONTINUED | OUTPATIENT
Start: 2017-03-09 | End: 2017-03-10 | Stop reason: HOSPADM

## 2017-03-08 RX ORDER — MORPHINE SULFATE 2 MG/ML
4 INJECTION, SOLUTION INTRAMUSCULAR; INTRAVENOUS
Status: DISCONTINUED | OUTPATIENT
Start: 2017-03-08 | End: 2017-03-08

## 2017-03-08 RX ORDER — CETIRIZINE HYDROCHLORIDE 5 MG/1
5 TABLET ORAL DAILY
Status: DISCONTINUED | OUTPATIENT
Start: 2017-03-09 | End: 2017-03-10 | Stop reason: HOSPADM

## 2017-03-08 RX ORDER — HEPARIN SODIUM,PORCINE/D5W 25000/250
18 INTRAVENOUS SOLUTION INTRAVENOUS CONTINUOUS
Status: DISCONTINUED | OUTPATIENT
Start: 2017-03-08 | End: 2017-03-10 | Stop reason: HOSPADM

## 2017-03-08 RX ORDER — ATORVASTATIN CALCIUM 20 MG/1
40 TABLET, FILM COATED ORAL DAILY
Status: DISCONTINUED | OUTPATIENT
Start: 2017-03-08 | End: 2017-03-10 | Stop reason: HOSPADM

## 2017-03-08 RX ORDER — MORPHINE SULFATE 15 MG/1
45 TABLET, FILM COATED, EXTENDED RELEASE ORAL EVERY 8 HOURS
Status: DISCONTINUED | OUTPATIENT
Start: 2017-03-08 | End: 2017-03-08

## 2017-03-08 RX ORDER — MORPHINE SULFATE 15 MG/1
30 TABLET, FILM COATED, EXTENDED RELEASE ORAL EVERY 8 HOURS
Status: DISCONTINUED | OUTPATIENT
Start: 2017-03-08 | End: 2017-03-10 | Stop reason: HOSPADM

## 2017-03-08 RX ORDER — IBUPROFEN 200 MG
24 TABLET ORAL
Status: DISCONTINUED | OUTPATIENT
Start: 2017-03-08 | End: 2017-03-10 | Stop reason: HOSPADM

## 2017-03-08 RX ORDER — ASPIRIN 325 MG
325 TABLET ORAL
Status: COMPLETED | OUTPATIENT
Start: 2017-03-08 | End: 2017-03-08

## 2017-03-08 RX ORDER — HYDROCODONE BITARTRATE AND ACETAMINOPHEN 5; 325 MG/1; MG/1
1 TABLET ORAL EVERY 6 HOURS PRN
Status: DISCONTINUED | OUTPATIENT
Start: 2017-03-08 | End: 2017-03-10 | Stop reason: HOSPADM

## 2017-03-08 RX ORDER — HYDROCODONE BITARTRATE AND ACETAMINOPHEN 5; 325 MG/1; MG/1
1 TABLET ORAL EVERY 6 HOURS PRN
COMMUNITY
End: 2017-04-25 | Stop reason: SDUPTHER

## 2017-03-08 RX ORDER — PANTOPRAZOLE SODIUM 40 MG/1
40 TABLET, DELAYED RELEASE ORAL DAILY
Status: DISCONTINUED | OUTPATIENT
Start: 2017-03-09 | End: 2017-03-10 | Stop reason: HOSPADM

## 2017-03-08 RX ORDER — ASPIRIN 81 MG/1
81 TABLET ORAL DAILY
Status: DISCONTINUED | OUTPATIENT
Start: 2017-03-09 | End: 2017-03-10 | Stop reason: HOSPADM

## 2017-03-08 RX ORDER — SODIUM CHLORIDE 0.9 % (FLUSH) 0.9 %
3 SYRINGE (ML) INJECTION EVERY 8 HOURS
Status: DISCONTINUED | OUTPATIENT
Start: 2017-03-08 | End: 2017-03-10 | Stop reason: HOSPADM

## 2017-03-08 RX ORDER — NITROGLYCERIN 0.4 MG/1
0.4 TABLET SUBLINGUAL
Status: COMPLETED | OUTPATIENT
Start: 2017-03-08 | End: 2017-03-08

## 2017-03-08 RX ORDER — CLOPIDOGREL 300 MG/1
600 TABLET, FILM COATED ORAL
Status: COMPLETED | OUTPATIENT
Start: 2017-03-08 | End: 2017-03-08

## 2017-03-08 RX ORDER — NITROGLYCERIN 0.4 MG/1
0.4 TABLET SUBLINGUAL EVERY 5 MIN PRN
Status: DISCONTINUED | OUTPATIENT
Start: 2017-03-08 | End: 2017-03-10 | Stop reason: HOSPADM

## 2017-03-08 RX ORDER — LORATADINE 10 MG/1
10 TABLET ORAL DAILY
COMMUNITY

## 2017-03-08 RX ORDER — METOCLOPRAMIDE 10 MG/1
10 TABLET ORAL
Status: DISCONTINUED | OUTPATIENT
Start: 2017-03-08 | End: 2017-03-10 | Stop reason: HOSPADM

## 2017-03-08 RX ORDER — AMOXICILLIN 250 MG
1 CAPSULE ORAL DAILY PRN
Status: DISCONTINUED | OUTPATIENT
Start: 2017-03-08 | End: 2017-03-10 | Stop reason: HOSPADM

## 2017-03-08 RX ORDER — LEVOTHYROXINE SODIUM 88 UG/1
88 TABLET ORAL DAILY
Status: DISCONTINUED | OUTPATIENT
Start: 2017-03-09 | End: 2017-03-10 | Stop reason: HOSPADM

## 2017-03-08 RX ORDER — IBUPROFEN 200 MG
16 TABLET ORAL
Status: DISCONTINUED | OUTPATIENT
Start: 2017-03-08 | End: 2017-03-10 | Stop reason: HOSPADM

## 2017-03-08 RX ORDER — METHADONE HYDROCHLORIDE 5 MG/1
5 TABLET ORAL EVERY 12 HOURS
Status: DISCONTINUED | OUTPATIENT
Start: 2017-03-08 | End: 2017-03-10 | Stop reason: HOSPADM

## 2017-03-08 RX ORDER — ONDANSETRON 8 MG/1
8 TABLET, ORALLY DISINTEGRATING ORAL EVERY 6 HOURS PRN
Status: DISCONTINUED | OUTPATIENT
Start: 2017-03-08 | End: 2017-03-10 | Stop reason: HOSPADM

## 2017-03-08 RX ORDER — GLUCAGON 1 MG
1 KIT INJECTION
Status: DISCONTINUED | OUTPATIENT
Start: 2017-03-08 | End: 2017-03-10 | Stop reason: HOSPADM

## 2017-03-08 RX ORDER — FUROSEMIDE 40 MG/1
40 TABLET ORAL 2 TIMES DAILY
Status: DISCONTINUED | OUTPATIENT
Start: 2017-03-08 | End: 2017-03-10 | Stop reason: HOSPADM

## 2017-03-08 RX ORDER — HEPARIN SODIUM,PORCINE/D5W 25000/250
18 INTRAVENOUS SOLUTION INTRAVENOUS CONTINUOUS
Status: DISCONTINUED | OUTPATIENT
Start: 2017-03-08 | End: 2017-03-08

## 2017-03-08 RX ADMIN — HEPARIN SODIUM AND DEXTROSE 18 UNITS/KG/HR: 10000; 5 INJECTION INTRAVENOUS at 04:03

## 2017-03-08 RX ADMIN — ATORVASTATIN CALCIUM 40 MG: 20 TABLET, FILM COATED ORAL at 04:03

## 2017-03-08 RX ADMIN — METHADONE HYDROCHLORIDE 5 MG: 5 TABLET ORAL at 09:03

## 2017-03-08 RX ADMIN — INSULIN DETEMIR 4 UNITS: 100 INJECTION, SOLUTION SUBCUTANEOUS at 08:03

## 2017-03-08 RX ADMIN — MORPHINE SULFATE 30 MG: 15 TABLET, EXTENDED RELEASE ORAL at 09:03

## 2017-03-08 RX ADMIN — NITROGLYCERIN 0.4 MG: 0.4 TABLET SUBLINGUAL at 02:03

## 2017-03-08 RX ADMIN — CLOPIDOGREL BISULFATE 600 MG: 300 TABLET, FILM COATED ORAL at 04:03

## 2017-03-08 RX ADMIN — SODIUM CHLORIDE 500 ML: 0.9 INJECTION, SOLUTION INTRAVENOUS at 03:03

## 2017-03-08 RX ADMIN — ASPIRIN 325 MG ORAL TABLET 325 MG: 325 PILL ORAL at 02:03

## 2017-03-08 RX ADMIN — METOCLOPRAMIDE 10 MG: 10 TABLET ORAL at 08:03

## 2017-03-08 RX ADMIN — GABAPENTIN 300 MG: 300 CAPSULE ORAL at 09:03

## 2017-03-08 RX ADMIN — FUROSEMIDE 40 MG: 40 TABLET ORAL at 08:03

## 2017-03-08 NOTE — ED PROVIDER NOTES
Encounter Date: 3/8/2017       History     Chief Complaint   Patient presents with    Chest Pain     Sent from clinic with complaints of left sided chest pain that radiates to left jaw and left arm x 30 minutes. Given 1 nitro in clinic, no relief. +cardiac hx, 2 cardiac stents 6 years ago.      Review of patient's allergies indicates:   Allergen Reactions    Penicillins Swelling    Demerol [meperidine] Swelling    Dilaudid [hydromorphone (bulk)] Other (See Comments)     redness    Percocet [oxycodone-acetaminophen] Swelling    Adhesive tape-silicones Rash     HPI Comments: Ms. Whitley is a 56 yo female w/ PMH significant for CAD s/p 2 stents and MI approx 5-6 years ago, b/l breast cancer (s/p chemo, mastectomy), metastatic pancreatic cancer (s/p whipple 4/2016 w/ mets to liver and spine, b/l pleural effusion.  Presents today from a meeting with  where she became very upset with subsequent development of severe L-sided pressure-like chest pain radiating in jaw and LUE.  Endorses nausea.  Given 1 tab nitro prior to presentation, stated it did not help the pain.  Given 2nd tab of nitro with subsequent improvement in pain from 6/10 -> 2/10, became hypotensive to 70-80s systolic.  Initially bradycardic into 40s-50s on initial interview, HR improved into upper 80s with hypotension.    States that pressing on her chest and face leads to improvement in pain.    States that the quality of pain is similar to her prior MI, but the severity is much less.  Pt notes that she is taking significant amounts of norco, methadone and gabapentin Qdaily.  Reports that she is being worked up for possible VATS to drain b/l pleural effusions.   Within the past 2 weeks,s he has started taking lasix 80 Qdaily with improvement in BLE edema.    The history is provided by the spouse, medical records and the patient.     Past Medical History:   Diagnosis Date    Breast cancer     bilateral    Broken rib     right    CAD (coronary  artery disease)     Cataract     Centrilobular emphysema: per CT 2016 9/29/2016    Colon polyps     Coronary atherosclerosis of native coronary artery 4/24/2015    2 stents    Diabetes mellitus type 2 in nonobese     Diverticulosis of large intestine without hemorrhage 10/23/2015    Encounter for blood transfusion     Gastroesophageal reflux disease with esophagitis 10/23/2015    GI bleed     Hyperlipidemia     Hypertension     Myocardial infarction 2011    Myocardial infarction     Pancreas cancer     Pancreatitis     PONV (postoperative nausea and vomiting)     Type 2 diabetes mellitus with hyperglycemia 11/3/2015    Unspecified essential hypertension 4/24/2015    Vertebral fracture, pathological     l3, right side     Past Surgical History:   Procedure Laterality Date    APPENDECTOMY      BREAST RECONSTRUCTION      post mast    CARDIAC CATHETERIZATION  10/29/11    CATARACT EXTRACTION W/  INTRAOCULAR LENS IMPLANT Left 04/09/2015    Dr. Warner    CATARACT EXTRACTION W/  INTRAOCULAR LENS IMPLANT Right 05/14/15    Dr Warner    COLONOSCOPY N/A 10/19/2016    Procedure: COLONOSCOPY;  Surgeon: Alexis Tubbs MD;  Location: [facility] (4TH FLR);  Service: Endoscopy;  Laterality: N/A;    CORONARY ANGIOPLASTY WITH STENT PLACEMENT  09/08/2011    x2    HYSTERECTOMY      MASTECTOMY Bilateral     bilateral    RK/AK Bilateral     TONSILLECTOMY      WHIPPLE PROCEDURE W/ LAPAROSCOPY  4/2016     Family History   Problem Relation Age of Onset    Colon cancer Father 60    Cancer Father     Crohn's disease Sister     Diabetes Sister     Cancer Sister      breast    Hypertension Mother     Macular degeneration Mother     Cancer Mother      breast    Diabetes Mother     Cancer Sister      breast    Diabetes Sister     Heart disease Brother     Diabetes Maternal Aunt     Diabetes Maternal Grandmother     Ulcerative colitis Neg Hx      Social History   Substance Use Topics    Smoking status:  Former Smoker     Packs/day: 1.50     Years: 40.00     Types: Cigarettes     Quit date: 10/1/2015    Smokeless tobacco: Never Used      Comment: currently vapes    Alcohol use No     Review of Systems   Constitutional: Negative for chills and fever.   HENT: Negative for hearing loss.    Eyes: Negative for visual disturbance.   Respiratory: Positive for shortness of breath (states at baseline). Negative for wheezing.    Cardiovascular: Positive for chest pain and leg swelling. Negative for palpitations.   Gastrointestinal: Positive for nausea. Negative for abdominal distention, abdominal pain, blood in stool and vomiting.   Genitourinary: Negative for dysuria and hematuria.   Musculoskeletal: Negative for back pain.   Skin: Negative for pallor.   Neurological: Negative for weakness and headaches.   Psychiatric/Behavioral: Negative for behavioral problems.       Physical Exam   Initial Vitals   BP Pulse Resp Temp SpO2   03/08/17 1317 03/08/17 1317 03/08/17 1317 03/08/17 1317 03/08/17 1317   131/85 75 18 97.7 °F (36.5 °C) 95 %     Physical Exam    Nursing note and vitals reviewed.  Constitutional: She appears well-developed and well-nourished. She is not diaphoretic. No distress.   HENT:   Head: Normocephalic and atraumatic.   Eyes: EOM are normal.   Neck: Normal range of motion. Neck supple. No rigidity.   Cardiovascular:   Bradycardic  Regular rhythm  No murmurs, rubs, gallops  Application of pressure to L chest results in decreased pain   Pulmonary/Chest: Breath sounds normal. No stridor. No respiratory distress. She has no wheezes. She has no rhonchi. She has no rales.   Decreased breath sounds in b/l lung fields, most notable in lower lung fields   Abdominal: Soft. Bowel sounds are normal. She exhibits no distension. There is no tenderness. There is no rebound and no guarding.   Musculoskeletal: Normal range of motion. She exhibits edema (trace edema in LE).   Neurological: She is alert and oriented to person,  place, and time.   Skin: Skin is warm and dry.   Psychiatric: She has a normal mood and affect. Her behavior is normal. Judgment and thought content normal.         ED Course   Procedures  Labs Reviewed   TROPONIN I - Abnormal; Notable for the following:        Result Value    Troponin I 0.052 (*)     All other components within normal limits    Narrative:     Add on order [id number] PT, [id number] BNP. Per Andrew Blake MD    03/08/2017  14:22    CBC W/ AUTO DIFFERENTIAL - Abnormal; Notable for the following:     RBC 3.75 (*)     Hemoglobin 11.8 (*)     Hematocrit 36.4 (*)     MCH 31.5 (*)     RDW 18.9 (*)     Platelets 367 (*)     All other components within normal limits   COMPREHENSIVE METABOLIC PANEL - Abnormal; Notable for the following:     CO2 22 (*)     Glucose 127 (*)     All other components within normal limits    Narrative:     Add on order [id number] PT, [id number] BNP. Per Andrew Blake MD    03/08/2017  14:22    TSH - Abnormal; Notable for the following:     TSH 5.607 (*)     All other components within normal limits    Narrative:     Add on order [id number] PT, [id number] BNP. Per Andrew Blake MD    03/08/2017  14:22    TROPONIN I - Abnormal; Notable for the following:     Troponin I 1.186 (*)     All other components within normal limits   ANTI-XA HEPARIN MONITORING - Abnormal; Notable for the following:     Heparin Anti-Xa 0.27 (*)     All other components within normal limits   POCT GLUCOSE - Abnormal; Notable for the following:     POCT Glucose 241 (*)     All other components within normal limits   B-TYPE NATRIURETIC PEPTIDE   PROTIME-INR   TROPONIN I   PROTIME-INR    Narrative:     Add on order [id number] PT, [id number] BNP. Per Andrew Blake MD    03/08/2017  14:22    B-TYPE NATRIURETIC PEPTIDE    Narrative:     Add on order [id number] PT, [id number] BNP. Per Andrew Blake MD    03/08/2017  14:22    T4, FREE    Narrative:     Add on order [id number] PT, [id number] BNP. Per Andrew Blake MD    03/08/2017   14:22    APTT   APTT    Narrative:     Add on order [id number] PT, [id number] BNP. Per Andrew Blake MD    03/08/2017  14:22   ADD ON PER DR BLAKE ORDER [id number] APTT @1705 3/8/17     EKG Readings: (Independently Interpreted)   No notable ST elevation/depression  Bradycardia       X-Rays:   Independently Interpreted Readings:   Chest X-Ray: B/l pulm congestion  B/l pleural effusion, R>L     Medical Decision Making:   Initial Assessment:   HPI Comments: Ms. Whitley is a 58 yo female w/ PMH significant for CAD s/p 2 stents and MI approx 5-6 years ago, b/l breast cancer (s/p chemo, mastectomy), metastatic pancreatic cancer w/ mets to liver and spine, b/l pleural effusion. Presenting today with chest pain concerning for ACS  Differential Diagnosis:   ACS, PE, unstable angina, costochondritis, PNA,   Independently Interpreted Test(s):   I have ordered and independently interpreted X-rays - see prior notes.  I have ordered and independently interpreted EKG Reading(s) - see prior notes  Clinical Tests:   Lab Tests: Ordered and Reviewed       <> Summary of Lab: Elevated troponin to 0.052  Radiological Study: Ordered and Reviewed  Medical Tests: Ordered and Reviewed  ED Management:   x1  Nitro tab x1 (2nd dose overall - 1st dose given prior to presentation)  1 L NS bolus x1 for hypotension  Cardiology consulted  -Will start plavix 600, heparin gtt, and atorvastatin per verbal cards recommendations    Will admit for medical management of ACS         APC / Resident Notes:   5:38 PM  Pt endorsing 1-2/10 jaw pain. Abd pain that she states is consistent with prior MI  Repeat EKG with bradycardia, no ST changes.  SBP low 100s    6:35 PM  On reevaluation, pt reports chest and jaw pain are absent.  States she fell asleep and is resting comfortably.    Yuri Gerber MD           Attending Attestation:   Physician Attestation Statement for Resident:  As the supervising MD   Physician Attestation Statement: I have personally seen  and examined this patient.   I agree with the above history. -:   As the supervising MD I agree with the above PE.    As the supervising MD I agree with the above treatment, course, plan, and disposition.   -: NSTEMI.  Serial EKGs with no acute changes, no sign of STEMI or dynamic changes.  ASA, NTG.  Cardiology consulted. Admit.                    ED Course     Clinical Impression:   Diagnoses of Chest pain and NSTEMI (non-ST elevated myocardial infarction) were pertinent to this visit.    Admit to hospital medicine.    Disposition:   Disposition: Admitted       Yuri Gerber MD  Resident  03/10/17 [id number]       Andrew Blake MD  03/11/17 [id number]

## 2017-03-08 NOTE — PROGRESS NOTES
"History & Physical    SUBJECTIVE:     History of Present Illness:    Patient is a 58 y/o female with DM on insulin, hypothyroidism, breast cancer s/p bilateral mastectomy and reconstruction, MI s/p 2 stents,  metastatic pancreatic cancer currently s/p chemo, radiation, and whipple on 4/12/16 with bilateral pleural effusions noted on restaging CT on 2/27/17. She complaints of worsening SOB x last 2-3 months. Never had thoracentesis. CXR on 2/10/17 noted small right pleural effusion and trace left effusion. Restaging CT chest/abdomen/pelvis on 2/27/17 significant for moderate bilateral effusions with apparent partial loculation of right. Recently started on lasix 40mg BID for LE edema which has since been decreased to 40mg once daily. Stress test and echo last performed Sept 2016 which were normal. Recently stopped chemo 2/2 non responsive and is scheduled to see radiation oncology. Pt endorses occasional cough but denies fever, chills, chest pain, hemoptysis, appetite/weight changes. Denies blood thinners.     Former smoker. Quit 2 years ago. 1ppd x 45-50 years  PSH: bilateral mastectomy with bilateral reconstruction, appendectomy, tonsillectomy, whipple proceudre, cardiac stent x 2, and hysterectomy     No chief complaint on file.      Review of patient's allergies indicates:   Allergen Reactions    Penicillins Swelling    Demerol [meperidine] Swelling    Dilaudid [hydromorphone (bulk)] Other (See Comments)     redness    Percocet [oxycodone-acetaminophen] Swelling    Adhesive tape-silicones Rash       Current Outpatient Prescriptions   Medication Sig Dispense Refill    BD ULTRA-FINE EDWIN PEN NEEDLES 32 x 5/32 " Ndle Uses 5 times daily, on multiple daily insulin injections 150 each 12    dexamethasone (DECADRON) 4 MG Tab Take 1 tablet (4 mg total) by mouth every 12 (twelve) hours. 60 tablet 1    diazePAM (VALIUM) 5 MG tablet Take 1 tablet (5 mg total) by mouth every 12 (twelve) hours as needed (muscle " spasms). 30 tablet 1    doxycycline (VIBRAMYCIN) 100 MG Cap Take 1 capsule (100 mg total) by mouth 2 (two) times daily. 20 capsule 0    fish oil-omega-3 fatty acids 300-1,000 mg capsule Take 1 g by mouth once daily.      furosemide (LASIX) 20 MG tablet Take 1 tablet (20 mg total) by mouth every other day. 30 tablet 1    furosemide (LASIX) 40 MG tablet Take 1 tablet (40 mg total) by mouth 2 (two) times daily. 60 tablet 1    gabapentin (NEURONTIN) 100 MG capsule Take 1 capsule (100 mg total) by mouth 3 (three) times daily. 90 capsule 2    gabapentin (NEURONTIN) 300 MG capsule Take 1 capsule (300 mg total) by mouth 3 (three) times daily. 90 capsule 2    insulin aspart (NOVOLOG FLEXPEN) 100 unit/mL InPn pen Inject 6 Units into the skin 3 (three) times daily with meals. With correction scale for 33 units max TDD 1 Box 6    insulin glargine, TOUJEO, (TOUJEO SOLOSTAR) 300 unit/mL (1.5 mL) InPn pen Inject 4 Units into the skin 2 (two) times daily. 1 Syringe 12    levothyroxine (SYNTHROID) 88 MCG tablet Take 1 tablet (88 mcg total) by mouth once daily. 90 tablet 3    lipase-protease-amylase 36,000-114,000- 180,000 unit CpDR Take 3 capsules by mouth 3 (three) times daily with meals. 270 capsule 5    methadone (DOLOPHINE) 5 MG tablet Take 1 tablet (5 mg total) by mouth every 12 (twelve) hours. 60 tablet 0    metoclopramide HCl (REGLAN) 10 MG tablet Take 1 tablet (10 mg total) by mouth 4 (four) times daily before meals and nightly. 90 tablet 1    morphine (MS CONTIN) 15 MG 12 hr tablet Take 3 tablets (45 mg total) by mouth every 8 (eight) hours. 270 tablet 0    nicotine (NICODERM CQ) 14 mg/24 hr Place 1 patch onto the skin once daily. (Patient taking differently: Place 1 patch onto the skin as needed (smoking cessation). ) 30 patch 3    ondansetron (ZOFRAN-ODT) 4 MG TbDL Take 2 tablets (8 mg total) by mouth every 8 (eight) hours as needed. 31 tablet 0    ondansetron (ZOFRAN-ODT) 8 MG TbDL Take 1 tablet (8 mg  total) by mouth every 6 (six) hours as needed. 100 tablet 1    oxycodone-acetaminophen (PERCOCET)  mg per tablet Take 1 tablet by mouth every 4 (four) hours as needed for Pain. 180 tablet 0    senna-docusate 8.6-50 mg (PERICOLACE) 8.6-50 mg per tablet Take 1 tablet by mouth daily as needed for Constipation.       No current facility-administered medications for this visit.        Past Medical History:   Diagnosis Date    Breast cancer     bilateral    Broken rib     right    CAD (coronary artery disease)     Cataract     Centrilobular emphysema: per CT 2016 9/29/2016    Colon polyps     Coronary atherosclerosis of native coronary artery 4/24/2015    2 stents    Diabetes mellitus type 2 in nonobese     Diverticulosis of large intestine without hemorrhage 10/23/2015    Encounter for blood transfusion     Gastroesophageal reflux disease with esophagitis 10/23/2015    GI bleed     Hyperlipidemia     Hypertension     Myocardial infarction 2011    Myocardial infarction     Pancreas cancer     Pancreatitis     PONV (postoperative nausea and vomiting)     Type 2 diabetes mellitus with hyperglycemia 11/3/2015    Unspecified essential hypertension 4/24/2015    Vertebral fracture, pathological     l3, right side     Past Surgical History:   Procedure Laterality Date    APPENDECTOMY      BREAST RECONSTRUCTION      post mast    CARDIAC CATHETERIZATION  10/29/11    CATARACT EXTRACTION W/  INTRAOCULAR LENS IMPLANT Left 04/09/2015    Dr. Warner    CATARACT EXTRACTION W/  INTRAOCULAR LENS IMPLANT Right 05/14/15    Dr Warner    COLONOSCOPY N/A 10/19/2016    Procedure: COLONOSCOPY;  Surgeon: Alexis Tubbs MD;  Location: [address]);  Service: Endoscopy;  Laterality: N/A;    CORONARY ANGIOPLASTY WITH STENT PLACEMENT  09/08/2011    x2    HYSTERECTOMY      MASTECTOMY Bilateral     bilateral    RK/AK Bilateral     TONSILLECTOMY      WHIPPLE PROCEDURE W/ LAPAROSCOPY  4/2016     Family  History   Problem Relation Age of Onset    Colon cancer Father 60    Cancer Father     Crohn's disease Sister     Diabetes Sister     Cancer Sister      breast    Hypertension Mother     Macular degeneration Mother     Cancer Mother      breast    Diabetes Mother     Cancer Sister      breast    Diabetes Sister     Heart disease Brother     Diabetes Maternal Aunt     Diabetes Maternal Grandmother     Ulcerative colitis Neg Hx      Social History   Substance Use Topics    Smoking status: Former Smoker     Packs/day: 1.50     Years: 40.00     Types: Cigarettes     Quit date: 10/1/2015    Smokeless tobacco: Never Used      Comment: currently vapes    Alcohol use No        Review of Systems:  Review of Systems   Constitutional: Positive for fatigue. Negative for activity change, appetite change, chills and fever.   Respiratory: Positive for shortness of breath. Negative for cough.    Cardiovascular: Positive for leg swelling. Negative for chest pain and palpitations.   Gastrointestinal: Negative for abdominal pain, nausea and vomiting.   Genitourinary: Negative for difficulty urinating.   Musculoskeletal: Positive for back pain.   Skin: Negative for color change and rash.   Neurological: Negative for dizziness, seizures and syncope.   Psychiatric/Behavioral: The patient is not nervous/anxious.        OBJECTIVE:     Vital Signs (Most Recent)            Physical Exam:  Physical Exam   Constitutional: She is oriented to person, place, and time. She appears well-developed and well-nourished.   HENT:   Head: Normocephalic and atraumatic.   Eyes: EOM are normal. Pupils are equal, round, and reactive to light.   Neck: Normal range of motion. Neck supple.   Cardiovascular: Normal rate, regular rhythm and normal heart sounds.    2+ edema of LLE, trace edema of RLE   Pulmonary/Chest: Effort normal. No respiratory distress. She has decreased breath sounds in the right lower field and the left lower field.    Abdominal: Soft. Bowel sounds are normal. She exhibits no distension.   Musculoskeletal: Normal range of motion.   Neurological: She is alert and oriented to person, place, and time.   Skin: Skin is warm and dry.   Psychiatric: She has a normal mood and affect. Thought content normal.   Vitals reviewed.      Diagnostic Results:  CXR 2/10/17 : Small moderate right pleural effusion and trace left pleural effusion, unchanged, with adjacent dependent airspace disease.  Slightly decreased interstitial pulmonary edema.    CT CAP 2/27/17: 1.  Multiple hypodense liver lesions concerning for metastatic disease.  2.  Interval development of moderate bilateral pleural effusions, which appears partially loculated on the right.  Mild scattered airspace opacities seen with significant volume loss and compressive atelectasis within the lower lobes.  Difficult to exclude potential underlying lesion.  3.  Postsurgical changes of Whipple procedure.  4.  Stable mild compression deformity involving the T4 vertebral body with new sclerotic foci involving C7, T1, and T5 concerning for osseous metastatic disease.  5.  Additional findings as above.    Cervical and thoracic MRI 3/5/17:   MRI of the cervical and thoracic spine demonstrates progressive involvement of metastatic disease compared to the prior MRI with more levels now involved with marrow placement.  There is slightly worse compression fracture and more epidural involvement at T4 effacing the CSF around the spinal cord.      ASSESSMENT/PLAN:     Patient is a 56 y/o female with DM, hypothyroidism, breast cancer s/p bilateral mastectomy and reconstruction, MI s/p 2 stents,  metastatic pancreatic cancer currently s/p chem, radiation, and whipple on 4/12/16 with bilateral pleural effusions noted on restaging CT on 2/27/17 complaining SOB here today for surgical evaluation.      PLAN:Plan     - Will obtain 2D echo prior to surgery.     - Pending 2D echo will proceed to OR on  3/16/17 for bilateral VATS with drainage of effusion and pleurx versus pleurodesis   Appropriate patient education regarding the mike-operative period as well as intraperative details were discussed. Risks, including but not limited to, bleeding, infection, pain and anesthetic complication were discussed. Patient was given the opportunity to ask questions and to have those questions answered to their satisfaction. Patient verbalized understanding to both procedure and associated risks. Consent was obtained.            ATTENDING ATTESTATION:    I evaluated the patient and I agree with the assessment and plan.  I recommend bilateral VATS, drainage, pleurodesis vs PleuRx catheter based upon intraoperative findings. I have discussed the technical aspects, risks and benefits of the procedure with the patient.  I did inform the patient that the risks are the most common risks and that there are other less likely risks that are too numerous to elaborate.  The patient is aware and has agreed to undergo the procedure as detailed on the consent form.

## 2017-03-08 NOTE — ED NOTES
Bed: 11  Expected date:   Expected time:   Means of arrival:   Comments:  Ms. GILL is in room. Can't pull over, someone in chart.

## 2017-03-08 NOTE — ED NOTES
Patient identifiers have been checked and are correct.  Patient assisted to ED stretcher and placed in a hospital gown.     LOC: The patient is awake, alert, and aware of environment. The patient is oriented x 3 and speaking appropriately.   APPEARANCE: No acute distress noted.   PSYCHOSOCIAL: Patient is calm and cooperative.   SKIN: The skin is warm, dry.  RESPIRATORY: Airway is open and patent. Bilateral chest rise and fall. Respirations are spontaneous, even and unlabored. Normal effort and rate noted. No accessory muscle use noted.   CARDIAC: Patient has a normal rate. C/p left sided chest pain that radiates to left arm and jaw.   NEUROLOGIC: Eyes open spontaneously. Speech clear. Tolerating saliva secretions well. Able to follow commands, demonstrating ability to actively and appropriately communicate within context of current conversation. Symmetrical facial muscles. Moving all extremities well.  MUSCULOSKELETAL: No obvious deformities noted.

## 2017-03-08 NOTE — ED NOTES
Pt significantly hypotensive after receiving NTG SL x; reporting significant relief in pain; currently rating as a 2/10.  Dr Blake made aware of pt's blood pressure; fluid bolus ordered; will continue to monitor pt.

## 2017-03-08 NOTE — CONSULTS
Cardiology Consult Note  Attending Physician: Andrew Blake MD  Chief Complaint: Chest Pain (Sent from clinic with complaints of left sided chest pain that radiates to left jaw and left arm x 30 minutes. Given 1 nitro in clinic, no relief. +cardiac hx, 2 cardiac stents 6 years ago. )    HPI:   57 y.o. woman with history of breast cancer and now metastatic pancreatic cancer to liver and spine on FOLFOX, hx of MI and pci 5/6 years ago at an outside hospital.  She presented for chest pain that was similar to her previous MI pain, but less intense.  She reports it as left sided with radiation to jaw and arm.  Pain started around 12:45pm during an emotional discussion with her oncologist, and was relieved after her second SL nitro tab in the ER.  Her initial troponin is mildly elevated and EKG is unchanged from prior.  She also reports about 2 months of GOLD after one block and about one week of bilateral LE edema.  She denies orthopnea or PND.  Stress echocardiogram and nuclear stress were negative in 2016.      ROS:    Constitution: negative for - fever, chills, weight loss or weight gain  HENT: negative for - sore throat, rhinorrhea, or headache  Eyes: negative for - blurred or double vision  Cardiovascular: positive for - chest pain  Pulmonary: no cough, shortness of breath, or wheezing  Gastrointestinal: negative for - abdominal pain, nausea, vomiting, or diarrhea  : negative for - dysuria  Neurological: negative for - focal weakness or sensory changes  PMH:     Past Medical History:   Diagnosis Date    Breast cancer     bilateral    Broken rib     right    CAD (coronary artery disease)     Cataract     Centrilobular emphysema: per CT 2016 9/29/2016    Colon polyps     Coronary atherosclerosis of native coronary artery 4/24/2015    2 stents    Diabetes mellitus type 2 in nonobese     Diverticulosis of large intestine without hemorrhage 10/23/2015    Encounter for blood transfusion      "Gastroesophageal reflux disease with esophagitis 10/23/2015    GI bleed     Hyperlipidemia     Hypertension     Myocardial infarction 2011    Myocardial infarction     Pancreas cancer     Pancreatitis     PONV (postoperative nausea and vomiting)     Type 2 diabetes mellitus with hyperglycemia 11/3/2015    Unspecified essential hypertension 4/24/2015    Vertebral fracture, pathological     l3, right side     Past Surgical History:   Procedure Laterality Date    APPENDECTOMY      BREAST RECONSTRUCTION      post mast    CARDIAC CATHETERIZATION  10/29/11    CATARACT EXTRACTION W/  INTRAOCULAR LENS IMPLANT Left 04/09/2015    Dr. Warner    CATARACT EXTRACTION W/  INTRAOCULAR LENS IMPLANT Right 05/14/15    Dr Warner    COLONOSCOPY N/A 10/19/2016    Procedure: COLONOSCOPY;  Surgeon: Alexis Tubbs MD;  Location: [facility] ([address] [place] [address]);  Service: Endoscopy;  Laterality: N/A;    CORONARY ANGIOPLASTY WITH STENT PLACEMENT  09/08/2011    x2    HYSTERECTOMY      MASTECTOMY Bilateral     bilateral    RK/AK Bilateral     TONSILLECTOMY      WHIPPLE PROCEDURE W/ LAPAROSCOPY  4/2016     Allergies:     Review of patient's allergies indicates:   Allergen Reactions    Penicillins Swelling    Demerol [meperidine] Swelling    Dilaudid [hydromorphone (bulk)] Other (See Comments)     redness    Percocet [oxycodone-acetaminophen] Swelling    Adhesive tape-silicones Rash     Medications:     No current facility-administered medications on file prior to encounter.      Current Outpatient Prescriptions on File Prior to Encounter   Medication Sig Dispense Refill    BD ULTRA-FINE EDWIN PEN NEEDLES 32 x 5/32 " Ndle Uses 5 times daily, on multiple daily insulin injections 150 each 12    dexamethasone (DECADRON) 4 MG Tab Take 1 tablet (4 mg total) by mouth every 12 (twelve) hours. 60 tablet 1    diazePAM (VALIUM) 5 MG tablet Take 1 tablet (5 mg total) by mouth every 12 (twelve) hours as needed (muscle spasms). 30 tablet 1 "    fish oil-omega-3 fatty acids 300-1,000 mg capsule Take 1 g by mouth once daily.      furosemide (LASIX) 40 MG tablet Take 1 tablet (40 mg total) by mouth 2 (two) times daily. 60 tablet 1    gabapentin (NEURONTIN) 100 MG capsule Take 1 capsule (100 mg total) by mouth 3 (three) times daily. 90 capsule 2    gabapentin (NEURONTIN) 300 MG capsule Take 1 capsule (300 mg total) by mouth 3 (three) times daily. 90 capsule 2    insulin aspart (NOVOLOG FLEXPEN) 100 unit/mL InPn pen Inject 6 Units into the skin 3 (three) times daily with meals. With correction scale for 33 units max TDD 1 Box 6    insulin glargine, TOUJEO, (TOUJEO SOLOSTAR) 300 unit/mL (1.5 mL) InPn pen Inject 4 Units into the skin 2 (two) times daily. 1 Syringe 12    levothyroxine (SYNTHROID) 88 MCG tablet Take 1 tablet (88 mcg total) by mouth once daily. 90 tablet 3    lipase-protease-amylase 36,000-114,000- 180,000 unit CpDR Take 3 capsules by mouth 3 (three) times daily with meals. 270 capsule 5    methadone (DOLOPHINE) 5 MG tablet Take 1 tablet (5 mg total) by mouth every 12 (twelve) hours. 60 tablet 0    metoclopramide HCl (REGLAN) 10 MG tablet Take 1 tablet (10 mg total) by mouth 4 (four) times daily before meals and nightly. 90 tablet 1    morphine (MS CONTIN) 15 MG 12 hr tablet Take 3 tablets (45 mg total) by mouth every 8 (eight) hours. 270 tablet 0    nicotine (NICODERM CQ) 14 mg/24 hr Place 1 patch onto the skin once daily. (Patient taking differently: Place 1 patch onto the skin as needed (smoking cessation). ) 30 patch 3    ondansetron (ZOFRAN-ODT) 4 MG TbDL Take 2 tablets (8 mg total) by mouth every 8 (eight) hours as needed. 31 tablet 0    ondansetron (ZOFRAN-ODT) 8 MG TbDL Take 1 tablet (8 mg total) by mouth every 6 (six) hours as needed. 100 tablet 1    oxycodone-acetaminophen (PERCOCET)  mg per tablet Take 1 tablet by mouth every 4 (four) hours as needed for Pain. 180 tablet 0    senna-docusate 8.6-50 mg (PERICOLACE)  8.6-50 mg per tablet Take 1 tablet by mouth daily as needed for Constipation.      [DISCONTINUED] doxycycline (VIBRAMYCIN) 100 MG Cap Take 1 capsule (100 mg total) by mouth 2 (two) times daily. 20 capsule 0    [DISCONTINUED] furosemide (LASIX) 20 MG tablet Take 1 tablet (20 mg total) by mouth every other day. 30 tablet 1       Inpatient Medications   Continuous Infusions:   Scheduled Meds:   morphine  4 mg Intravenous ED 1 Time     PRN Meds:     Social History:     Social History   Substance Use Topics    Smoking status: Former Smoker     Packs/day: 1.50     Years: 40.00     Types: Cigarettes     Quit date: 10/1/2015    Smokeless tobacco: Never Used      Comment: currently vapes    Alcohol use No     Family History:     Family History   Problem Relation Age of Onset    Colon cancer Father 60    Cancer Father     Crohn's disease Sister     Diabetes Sister     Cancer Sister      breast    Hypertension Mother     Macular degeneration Mother     Cancer Mother      breast    Diabetes Mother     Cancer Sister      breast    Diabetes Sister     Heart disease Brother     Diabetes Maternal Aunt     Diabetes Maternal Grandmother     Ulcerative colitis Neg Hx      Physical Exam:     Vitals:  Temp:  [97.7 °F (36.5 °C)]   Pulse:  [49-81]   Resp:  [13-21]   BP: ()/(45-85)   SpO2:  [95 %-100 %]   I/O's:  No intake or output data in the 24 hours ending 03/08/17 1556     Constitutional: NAD, conversant  HEENT: Sclera anicteric, PERRL  Neck: 10cm JVD, no carotid bruits  CV: RRR, no m/r/g, normal S1/S2  Pulm: decreased breath sounds bilaterally bases.  GI: Abdomen soft, NTND, +BS  Extremities: trace LE edema  Skin: No ecchymosis, erythema, or ulcers        Labs:       Recent Labs  Lab 03/08/17  1358      K 3.9      CO2 22*   BUN 11   CREATININE 0.8   *   ANIONGAP 11       Recent Labs  Lab 03/08/17  1358   TROPONINI 0.052*   BNP 39      Recent Labs  Lab 03/08/17  1358   WBC 5.66   HGB 11.8*    HCT 36.4*   *       Recent Labs  Lab 03/08/17  1358   AST 26   ALT 21   ALKPHOS 112   BILITOT 0.4   ALBUMIN 3.7        Imaging:     EF   Date Value Ref Range Status   09/12/2016 55 55 - 65    04/06/2016 60 55 - 65    10/24/2014 55 55 - 65              Assessment:   58 yo female with metastatic pancreatic cancer and remote pci for CAD who presents with NSTEMI    Plan:   -Currently chest pain free.  Recommend noninvasive treatment for ACS.  -2D echo with doppler  -heparin gtt x48 hours, 600mg plavix load followed by 75mg daily.  325mg asa load (already given), followed by 81mg daily, start atorvastatin 40mg.  -continue to trend troponin q6 hours until flat or down trending  -recommend continuation of home dose of lasix for now.             Signed:  VIRA Boyle Jr, MD MS  Chief Cardiology Fellow [id number]  [phone]

## 2017-03-08 NOTE — LETTER
[facility]  [address]  [facility] [address]  Phone: [phone]  Fax: [phone] March 8, 2017        Wendi Nguyễn MD  [address] [facility] [address]    Patient: Lashay Whitley   MR Number: [id number]   YOB: 1960   Date of Visit: 3/8/2017     Dear Dr. Nguynễ:    Thank you for kindly consulting me to evaluate Ms. Whitley.  She presents today for evaluation of bilateral pleural effusions in the setting of stage IV pancreatic cancer.  She reports dyspnea on exertion; however, denies any orthopnea or paroxysmal nocturnal dyspnea.  I reviewed the most recent chest x-ray and chest CT images, which demonstrate moderate-to-large bilateral pleural effusions.  She also has stigmata highly suggestive of metastatic cancer.    I recommend that Ms. Whitley undergoes a bilateral VATS drainage.  The determination of pleurodesis versus PleurX catheter placement will be dependent upon intraoperative findings.  She will need a 2D echo to rule out  a cardiac etiology of the bilateral effusions.    Thank you for kindly consulting me to participate in Ms. Whitley's care.  We will certainly keep you well informed of her progress.    Sincerely,      Luke Deluna MD   Section of Thoracic Surgery  [facility]    BLP/and    CC  April Kelsey MD

## 2017-03-08 NOTE — IP AVS SNAPSHOT
[facility]  [address]  [facility] [address]  Phone: [phone]           Patient Discharge Instructions     Our goal is to set you up for success. This packet includes information on your condition, medications, and your home care. It will help you to care for yourself so you don't get sicker and need to go back to the hospital.     Please ask your nurse if you have any questions.        There are many details to remember when preparing to leave the hospital. Here is what you will need to do:    1. Take your medicine. If you are prescribed medications, review your Medication List in the following pages. You may have new medications to  at the pharmacy and others that you'll need to stop taking. Review the instructions for how and when to take your medications. Talk with your doctor or nurses if you are unsure of what to do.     2. Go to your follow-up appointments. Specific follow-up information is listed in the following pages. Your may be contacted by a transition nurse or clinical provider about future appointments. Be sure we have all of the phone numbers to reach you, if needed. Please contact your provider's office if you are unable to make an appointment.     3. Watch for warning signs. Your doctor or nurse will give you detailed warning signs to watch for and when to call for assistance. These instructions may also include educational information about your condition. If you experience any of warning signs to your health, call your doctor.               [facility]  Unless otherwise directed by your provider, please contact [facility], our nurse care line that is available for 24/7 assistance.     [phone] (toll-free)    Registered nurses in the [facility] provide clinical advisement, health education, appointment booking, and other advisory services.                    ** Verify the list of medication(s) below is accurate and up  to date. Carry this with you in case of emergency. If your medications have changed, please notify your healthcare provider.             Medication List      START taking these medications        Additional Info    Begin Date AM Noon PM Bedtime    atorvastatin 40 MG tablet   Commonly known as:  LIPITOR   Quantity:  30 tablet   Refills:  2   Dose:  40 mg    Last time this was given:  40 mg on 3/10/2017  8:33 AM   Instructions:  Take 1 tablet (40 mg total) by mouth once daily.                               clopidogrel 75 mg tablet   Commonly known as:  PLAVIX   Quantity:  30 tablet   Refills:  11   Dose:  75 mg    Last time this was given:  75 mg on 3/10/2017  8:32 AM   Instructions:  Take 1 tablet (75 mg total) by mouth once daily.                               metoprolol tartrate 25 MG tablet   Commonly known as:  LOPRESSOR   Quantity:  30 tablet   Refills:  2   Dose:  12.5 mg    Instructions:  Take 0.5 tablets (12.5 mg total) by mouth 2 (two) times daily.                               nitroGLYCERIN 0.4 MG SL tablet   Commonly known as:  NITROSTAT   Quantity:  30 tablet   Refills:  0   Dose:  0.4 mg    Last time this was given:  0.4 mg on 3/8/2017  2:55 PM   Instructions:  Place 1 tablet (0.4 mg total) under the tongue every 5 (five) minutes as needed for Chest pain.                            pantoprazole 40 MG tablet   Commonly known as:  PROTONIX   Quantity:  30 tablet   Refills:  2   Dose:  40 mg    Last time this was given:  40 mg on 3/10/2017  8:33 AM   Instructions:  Take 1 tablet (40 mg total) by mouth once daily.                                 CHANGE how you take these medications        Additional Info    Begin Date AM Noon PM Bedtime    insulin aspart 100 unit/mL Inpn pen   Commonly known as:  NOVOLOG FLEXPEN   Quantity:  1 Box   Refills:  6   Dose:  6 Units   What changed:    - how much to take  - additional instructions    Last time this was given:  1 Units on 3/9/2017  9:15 PM   Instructions:  Inject  "6 Units into the skin 3 (three) times daily with meals. With correction scale for 33 units max TDD                            morphine 15 MG 12 hr tablet   Commonly known as:  MS CONTIN   Quantity:  270 tablet   Refills:  0   Dose:  45 mg   What changed:  how much to take    Last time this was given:  30 mg on 3/10/2017  5:27 AM   Instructions:  Take 3 tablets (45 mg total) by mouth every 8 (eight) hours.                               nicotine 14 mg/24 hr   Commonly known as:  NICODERM CQ   Quantity:  30 patch   Refills:  3   Dose:  1 patch   What changed:    - when to take this  - reasons to take this    Instructions:  Place 1 patch onto the skin once daily.                              CONTINUE taking these medications        Additional Info    Begin Date AM Noon PM Bedtime    aspirin 81 MG EC tablet   Commonly known as:  ECOTRIN   Refills:  0   Dose:  81 mg    Last time this was given:  81 mg on 3/10/2017  8:33 AM   Instructions:  Take 81 mg by mouth once daily.                               BD ULTRA-FINE EDWIN PEN NEEDLES 32 gauge x 5/32" Ndle   Quantity:  150 each   Refills:  12   Generic drug:  pen needle, diabetic    Instructions:  Uses 5 times daily, on multiple daily insulin injections                            diazePAM 5 MG tablet   Commonly known as:  VALIUM   Quantity:  30 tablet   Refills:  1   Dose:  5 mg    Instructions:  Take 1 tablet (5 mg total) by mouth every 12 (twelve) hours as needed (muscle spasms).                            furosemide 40 MG tablet   Commonly known as:  LASIX   Quantity:  60 tablet   Refills:  1   Dose:  40 mg    Last time this was given:  40 mg on 3/10/2017  8:33 AM   Instructions:  Take 1 tablet (40 mg total) by mouth 2 (two) times daily.                               gabapentin 300 MG capsule   Commonly known as:  NEURONTIN   Quantity:  90 capsule   Refills:  2   Dose:  300 mg    Last time this was given:  300 mg on 3/10/2017  5:27 AM   Instructions:  Take 1 capsule (300 " mg total) by mouth 3 (three) times daily.                               hydrocodone-acetaminophen 5-325mg 5-325 mg per tablet   Commonly known as:  NORCO   Refills:  0   Dose:  1 tablet    Instructions:  Take 1 tablet by mouth every 6 (six) hours as needed for Pain.                            insulin glargine (TOUJEO) 300 unit/mL (1.5 mL) Inpn pen   Commonly known as:  TOUJEO SOLOSTAR   Quantity:  1 Syringe   Refills:  12   Dose:  4 Units    Instructions:  Inject 4 Units into the skin 2 (two) times daily.                            KRILL OIL ORAL   Refills:  0    Instructions:  Take by mouth once daily.                            levothyroxine 88 MCG tablet   Commonly known as:  SYNTHROID   Quantity:  90 tablet   Refills:  3   Dose:  88 mcg    Last time this was given:  88 mcg on 3/10/2017  5:28 AM   Instructions:  Take 1 tablet (88 mcg total) by mouth once daily.                               lipase-protease-amylase 36,000-114,000- 180,000 unit Cpdr   Quantity:  270 capsule   Refills:  5   Dose:  3 capsule   Indications:  Pancreatic Insufficiency    Instructions:  Take 3 capsules by mouth 3 (three) times daily with meals.                               loratadine 10 mg tablet   Commonly known as:  CLARITIN   Refills:  0   Dose:  10 mg    Instructions:  Take 10 mg by mouth once daily.                            methadone 5 MG tablet   Commonly known as:  DOLOPHINE   Quantity:  60 tablet   Refills:  0   Dose:  5 mg    Last time this was given:  5 mg on 3/10/2017  8:35 AM   Instructions:  Take 1 tablet (5 mg total) by mouth every 12 (twelve) hours.                               metoclopramide HCl 10 MG tablet   Commonly known as:  REGLAN   Quantity:  90 tablet   Refills:  1   Dose:  10 mg    Last time this was given:  10 mg on 3/10/2017 10:50 AM   Instructions:  Take 1 tablet (10 mg total) by mouth 4 (four) times daily before meals and nightly.                               multivitamin capsule   Refills:  0   Dose:  1  capsule    Instructions:  Take 1 capsule by mouth once daily.                            ondansetron 8 MG Tbdl   Commonly known as:  ZOFRAN-ODT   Quantity:  100 tablet   Refills:  1   Dose:  8 mg    Instructions:  Take 1 tablet (8 mg total) by mouth every 6 (six) hours as needed.                            PROBIOTIC (B. COAGULANS) ORAL   Refills:  0    Instructions:  Take by mouth.                            senna-docusate 8.6-50 mg 8.6-50 mg per tablet   Commonly known as:  PERICOLACE   Refills:  0   Dose:  1 tablet    Instructions:  Take 1 tablet by mouth daily as needed for Constipation.                            VITAMIN C 500 MG tablet   Refills:  0   Dose:  500 mg   Generic drug:  ascorbic acid (vitamin C)    Instructions:  Take 500 mg by mouth 2 (two) times daily.                            vitamin D 1000 units Tab   Refills:  0   Dose:  185 mg    Instructions:  Take 185 mg by mouth once daily.                                 Where to Get Your Medications      These medications were sent to [facility]/pharmacy #[id number] - [place] - [address] [facility]  [address] [place] [address]     Phone:  [phone]     atorvastatin 40 MG tablet    clopidogrel 75 mg tablet    metoprolol tartrate 25 MG tablet    nitroGLYCERIN 0.4 MG SL tablet    pantoprazole 40 MG tablet                  Please bring to all follow up appointments:    1. A copy of your discharge instructions.  2. All medicines you are currently taking in their original bottles.  3. Identification and insurance card.    Please arrive 15 minutes ahead of scheduled appointment time.    Please call 24 hours in advance if you must reschedule your appointment and/or time.        Your Scheduled Appointments     Mar 21, 2017  8:00 AM CDT   Established Patient Visit with MD Valentín Pizano magalys - Cardiology ([facility] )    [address]  [place] [address] [phone]              Follow-up Information     Follow up with Tanner Leos  "MD On 3/21/2017.    Specialty:  Cardiovascular Disease    Why:  08:00 Cardiology appointment    Contact information:    [address]  [facility] [address]  [phone]        Referrals     Future Orders    Ambulatory referral to Cardiology           Primary Diagnosis     Your primary diagnosis was:  Heart Attack      Admission Information     Date & Time Provider Department CSN    3/8/2017  1:54 PM Chad Larsen MD [facility] [id number]      Care Providers     Provider Role Specialty Primary office phone    Chad Larsen MD Attending Provider Hospitalist [phone]    Chad Larsen MD Team Attending  Hospitalist [phone]    Hayde Gillis MD Team Attending  Hospitalist [phone]    Semaj Victoria MD Team Attending  Cardiology [phone]    Ashley Villagran MD Consulting Physician  Radiation Oncology [phone]      Your Vitals Were     BP Pulse Temp Resp Height Weight    122/85 (BP Location: Right leg, Patient Position: Sitting, BP Method: Automatic) 76 96.7 °F (35.9 °C) (Oral) 18 4' 11" (1.499 m) 45.6 kg (100 lb 8.5 oz)    SpO2 BMI             98% 20.3 kg/m2         Recent Lab Values        4/18/2015 8/1/2015 10/26/2015 3/7/2016 8/25/2016 10/19/2016 12/6/2016 2/23/2017      8:10 AM  8:15 AM  9:30 PM 10:29 AM  1:53 PM  9:20 AM  8:30 PM 11:00 AM    A1C 7.3 (H) 7.4 (H) 7.5 (H) 6.1 5.5 5.5 5.8 5.8    Comment for A1C at  1:53 PM on 8/25/2016:  According to ADA guidelines, hemoglobin A1C <7.0% represents  optimal control in non-pregnant diabetic patients.  Different  metrics may apply to specific populations.   Standards of Medical Care in Diabetes - 2016.  For the purpose of screening for the presence of diabetes:  <5.7%     Consistent with the absence of diabetes  5.7-6.4%  Consistent with increasing risk for diabetes   (prediabetes)  >or=6.5%  Consistent with diabetes  Currently no consensus exists for use of hemoglobin A1C  for diagnosis of diabetes for " children.      Comment for A1C at  9:20 AM on 10/19/2016:  According to ADA guidelines, hemoglobin A1C <7.0% represents  optimal control in non-pregnant diabetic patients.  Different  metrics may apply to specific populations.   Standards of Medical Care in Diabetes - 2016.  For the purpose of screening for the presence of diabetes:  <5.7%     Consistent with the absence of diabetes  5.7-6.4%  Consistent with increasing risk for diabetes   (prediabetes)  >or=6.5%  Consistent with diabetes  Currently no consensus exists for use of hemoglobin A1C  for diagnosis of diabetes for children.      Comment for A1C at  8:30 PM on 12/6/2016:  According to ADA guidelines, hemoglobin A1C <7.0% represents  optimal control in non-pregnant diabetic patients.  Different  metrics may apply to specific populations.   Standards of Medical Care in Diabetes - 2016.  For the purpose of screening for the presence of diabetes:  <5.7%     Consistent with the absence of diabetes  5.7-6.4%  Consistent with increasing risk for diabetes   (prediabetes)  >or=6.5%  Consistent with diabetes  Currently no consensus exists for use of hemoglobin A1C  for diagnosis of diabetes for children.      Comment for A1C at 11:00 AM on 2/23/2017:  According to ADA guidelines, hemoglobin A1C <7.0% represents  optimal control in non-pregnant diabetic patients.  Different  metrics may apply to specific populations.   Standards of Medical Care in Diabetes - 2016.  For the purpose of screening for the presence of diabetes:  <5.7%     Consistent with the absence of diabetes  5.7-6.4%  Consistent with increasing risk for diabetes   (prediabetes)  >or=6.5%  Consistent with diabetes  Currently no consensus exists for use of hemoglobin A1C  for diagnosis of diabetes for children.        Allergies as of 3/10/2017        Reactions    Penicillins Swelling    Demerol [Meperidine] Swelling    Dilaudid [Hydromorphone (Bulk)] Other (See Comments)    redness    Percocet  [Oxycodone-acetaminophen] Swelling    Adhesive Tape-silicones Rash      Advance Directives     An advance directive is a document which, in the event you are no longer able to make decisions for yourself, tells your healthcare team what kind of treatment you do or do not want to receive, or who you would like to make those decisions for you.  If you do not currently have an advance directive, [facility] encourages you to create one.  For more information call:  [phone]), [phone] ([phone]),  or log on to [email or web address].        Language Assistance Services     ATTENTION: Language assistance services are available, free of charge. Please call [phone].      ATENCIÓN: Si habla español, tiene a mcleod disposición servicios gratuitos de asistencia lingüística. Llame al [phone].     CHÚ Ý: N?u b?n nói Ti?ng Vi?t, có các d?ch v? h? tr? ngôn ng? mi?n phí dành cho b?n. G?i s? [phone].        Diabetes Discharge Instructions                                    [facility] complies with applicable Federal civil rights laws and does not discriminate on the basis of race, color, national origin, age, disability, or sex.

## 2017-03-08 NOTE — ED NOTES
Pt placed on continuous cardiac and pulse ox monitoring with non-invasive blood pressure to cycle every 15 minutes.  Sinus bradycardia with a HR in the 40's noted; VS otherwise stable.  Pt complains of left side chest pain radiating into the right jaw; currently rating pain as a 6/10; no other complaints.  Bed locked in lowest position; side rails up and locked x 2; call light and personal belongings within reach.  Pt awaiting MD evaluation; will continue to monitor pt.

## 2017-03-08 NOTE — ED TRIAGE NOTES
C/o left sided chest pain that started approximately 30 mins ago. Given 1 nitro in clinic pta. Pancreatic CA patient, last chemo 3 weeks ago.

## 2017-03-08 NOTE — H&P
HISTORY & PHYSICAL  Hospital Medicine    [facility] [facility]    PRESENTING HISTORY     Chief Complaint/Reason for Admission:  NSTEMI    History of Present Illness:  57F CAD s/p MI with 2 stents in place, breast cancer (s/p bilateral mastectomy and reconstruction), metastatic pancreatic cancer (s/p whipple 4/2016 w/ mets to liver and spine, b/l pleural effusion for which there are plans being undergone for bilateral VATS with drainage of effusion +/- PleurX or pleurodesis), DM2 on insulin, and hypothyroidism here for evaluation of chest pain. Patient presents today from a meeting with oncologists where she became very upset.  She reports that there were discussions of potentially stopping chemotherapy as she wasn't responding as they would have liked.  Patient had subsequent development of severe L-sided pressure-like chest pain radiating in jaw and LUE. Endorses nausea. Given 1 tab nitro prior to presentation, stated it did not help the pain. Given 2nd tab of nitro with subsequent improvement in pain.  She states the pain probably lasted more than 30 minutes. She also reports about 2 months of GOLD after one block and about one week of bilateral LE edema. She denies orthopnea or PND. Stress echocardiogram and nuclear stress were negative in 2016.     The patient was noted to have mild elevation in troponin and ECG is not changed from prior. The patient is admitted to [facility] for further management of ACS.    Review of Systems:    Review of Systems   Constitutional: Negative for chills and fever.   HENT: Negative for congestion and sore throat.    Eyes: Negative for photophobia, pain and discharge.   Respiratory: Negative for cough, hemoptysis, sputum production and shortness of breath.    Cardiovascular: Positive for chest pain. Negative for palpitations and leg swelling.   Gastrointestinal: Negative for abdominal pain, diarrhea, nausea and vomiting.   Genitourinary: Negative for dysuria and urgency.   Musculoskeletal:  Negative for myalgias and neck pain.   Skin: Negative for itching and rash.   Neurological: Negative for sensory change, focal weakness and headaches.   Endo/Heme/Allergies: Negative for polydipsia. Does not bruise/bleed easily.   Psychiatric/Behavioral: Negative for depression and suicidal ideas.       PAST HISTORY:     Past Medical History:   Diagnosis Date    Breast cancer     bilateral    Broken rib     right    CAD (coronary artery disease)     Cataract     Centrilobular emphysema: per CT 2016 9/29/2016    Colon polyps     Coronary atherosclerosis of native coronary artery 4/24/2015    2 stents    Diabetes mellitus type 2 in nonobese     Diverticulosis of large intestine without hemorrhage 10/23/2015    Encounter for blood transfusion     Gastroesophageal reflux disease with esophagitis 10/23/2015    GI bleed     Hyperlipidemia     Hypertension     Myocardial infarction 2011    Myocardial infarction     Pancreas cancer     Pancreatitis     PONV (postoperative nausea and vomiting)     Type 2 diabetes mellitus with hyperglycemia 11/3/2015    Unspecified essential hypertension 4/24/2015    Vertebral fracture, pathological     l3, right side       Past Surgical History:   Procedure Laterality Date    APPENDECTOMY      BREAST RECONSTRUCTION      post mast    CARDIAC CATHETERIZATION  10/29/11    CATARACT EXTRACTION W/  INTRAOCULAR LENS IMPLANT Left 04/09/2015    Dr. Warner    CATARACT EXTRACTION W/  INTRAOCULAR LENS IMPLANT Right 05/14/15    Dr Warner    COLONOSCOPY N/A 10/19/2016    Procedure: COLONOSCOPY;  Surgeon: Alexis Tubbs MD;  Location: [facility] ([address] [place] [address]);  Service: Endoscopy;  Laterality: N/A;    CORONARY ANGIOPLASTY WITH STENT PLACEMENT  09/08/2011    x2    HYSTERECTOMY      MASTECTOMY Bilateral     bilateral    RK/AK Bilateral     TONSILLECTOMY      WHIPPLE PROCEDURE W/ LAPAROSCOPY  4/2016       Family History   Problem Relation Age of Onset    Colon cancer Father 60  "   Cancer Father     Crohn's disease Sister     Diabetes Sister     Cancer Sister      breast    Hypertension Mother     Macular degeneration Mother     Cancer Mother      breast    Diabetes Mother     Cancer Sister      breast    Diabetes Sister     Heart disease Brother     Diabetes Maternal Aunt     Diabetes Maternal Grandmother     Ulcerative colitis Neg Hx        Social History     Social History    Marital status:      Spouse name: N/A    Number of children: N/A    Years of education: N/A     Social History Main Topics    Smoking status: Former Smoker     Packs/day: 1.50     Years: 40.00     Types: Cigarettes     Quit date: 10/1/2015    Smokeless tobacco: Never Used      Comment: currently vapes    Alcohol use No    Drug use: No    Sexual activity: Yes     Partners: Male     Other Topics Concern    None     Social History Narrative       MEDICATIONS & ALLERGIES:     No current facility-administered medications on file prior to encounter.      Current Outpatient Prescriptions on File Prior to Encounter   Medication Sig Dispense Refill    BD ULTRA-FINE EDWIN PEN NEEDLES 32 x 5/32 " Ndle Uses 5 times daily, on multiple daily insulin injections 150 each 12    furosemide (LASIX) 40 MG tablet Take 1 tablet (40 mg total) by mouth 2 (two) times daily. 60 tablet 1    gabapentin (NEURONTIN) 300 MG capsule Take 1 capsule (300 mg total) by mouth 3 (three) times daily. 90 capsule 2    insulin aspart (NOVOLOG FLEXPEN) 100 unit/mL InPn pen Inject 6 Units into the skin 3 (three) times daily with meals. With correction scale for 33 units max TDD 1 Box 6    insulin glargine, TOUJEO, (TOUJEO SOLOSTAR) 300 unit/mL (1.5 mL) InPn pen Inject 4 Units into the skin 2 (two) times daily. 1 Syringe 12    levothyroxine (SYNTHROID) 88 MCG tablet Take 1 tablet (88 mcg total) by mouth once daily. 90 tablet 3    lipase-protease-amylase 36,000-114,000- 180,000 unit CpDR Take 3 capsules by mouth 3 (three) " times daily with meals. 270 capsule 5    methadone (DOLOPHINE) 5 MG tablet Take 1 tablet (5 mg total) by mouth every 12 (twelve) hours. 60 tablet 0    metoclopramide HCl (REGLAN) 10 MG tablet Take 1 tablet (10 mg total) by mouth 4 (four) times daily before meals and nightly. 90 tablet 1    morphine (MS CONTIN) 15 MG 12 hr tablet Take 3 tablets (45 mg total) by mouth every 8 (eight) hours. 270 tablet 0    nicotine (NICODERM CQ) 14 mg/24 hr Place 1 patch onto the skin once daily. (Patient taking differently: Place 1 patch onto the skin as needed (smoking cessation). ) 30 patch 3    ondansetron (ZOFRAN-ODT) 4 MG TbDL Take 2 tablets (8 mg total) by mouth every 8 (eight) hours as needed. 31 tablet 0    ondansetron (ZOFRAN-ODT) 8 MG TbDL Take 1 tablet (8 mg total) by mouth every 6 (six) hours as needed. 100 tablet 1    senna-docusate 8.6-50 mg (PERICOLACE) 8.6-50 mg per tablet Take 1 tablet by mouth daily as needed for Constipation.      diazePAM (VALIUM) 5 MG tablet Take 1 tablet (5 mg total) by mouth every 12 (twelve) hours as needed (muscle spasms). 30 tablet 1    [DISCONTINUED] dexamethasone (DECADRON) 4 MG Tab Take 1 tablet (4 mg total) by mouth every 12 (twelve) hours. 60 tablet 1    [DISCONTINUED] doxycycline (VIBRAMYCIN) 100 MG Cap Take 1 capsule (100 mg total) by mouth 2 (two) times daily. 20 capsule 0    [DISCONTINUED] fish oil-omega-3 fatty acids 300-1,000 mg capsule Take 1 g by mouth once daily.      [DISCONTINUED] furosemide (LASIX) 20 MG tablet Take 1 tablet (20 mg total) by mouth every other day. 30 tablet 1    [DISCONTINUED] gabapentin (NEURONTIN) 100 MG capsule Take 1 capsule (100 mg total) by mouth 3 (three) times daily. 90 capsule 2    [DISCONTINUED] oxycodone-acetaminophen (PERCOCET)  mg per tablet Take 1 tablet by mouth every 4 (four) hours as needed for Pain. 180 tablet 0        Review of patient's allergies indicates:   Allergen Reactions    Penicillins Swelling    Demerol  [meperidine] Swelling    Dilaudid [hydromorphone (bulk)] Other (See Comments)     redness    Percocet [oxycodone-acetaminophen] Swelling    Adhesive tape-silicones Rash       OBJECTIVE:     Vital Signs:  Temp:  [97.7 °F (36.5 °C)] 97.7 °F (36.5 °C)  Pulse:  [49-81] 76  Resp:  [13-21] 19  SpO2:  [95 %-100 %] 96 %  BP: ()/(45-85) 109/55  Body mass index is 20.8 kg/(m^2).     Physical Exam:    Objective:  General Appearance:  Comfortable, well-appearing, in no acute distress and not in pain.    Vital signs: (most recent): Blood pressure (!) 109/55, pulse 76, temperature 97.7 °F (36.5 °C), temperature source Oral, resp. rate 19, weight 46.7 kg (103 lb), SpO2 96 %, not currently breastfeeding.  No fever.    Output: Producing urine and producing stool.    HEENT: Normal HEENT exam.    Lungs:  Normal effort.  She is not in respiratory distress.  Breath sounds clear to auscultation.  There are rales.  No wheezes.    Heart: Bradycardia.  Regular rhythm.  S1 normal and S2 normal.  No murmur.   Chest: Symmetric chest wall expansion.   Extremities: Normal range of motion.  There is no deformity, effusion, local swelling or dependent edema.    Neurological: Patient is alert and oriented to person, place and time.  Normal strength.    Skin:  Warm and dry.    Abdomen: Abdomen is soft.  Bowel sounds are normal.   There is no abdominal tenderness.     Pupils:  Pupils are equal, round, and reactive to light.    Pulses: Distal pulses are intact.          Laboratory  Lab Results   Component Value Date    WBC 5.66 03/08/2017    HGB 11.8 (L) 03/08/2017    HCT 36.4 (L) 03/08/2017    MCV 97 03/08/2017     (H) 03/08/2017       Recent Labs  Lab 03/08/17  1358   *      K 3.9      CO2 22*   BUN 11   CREATININE 0.8   CALCIUM 9.5     Lab Results   Component Value Date    INR 1.0 03/08/2017    INR 1.0 12/06/2016    INR 1.0 09/13/2016     Lab Results   Component Value Date    HGBA1C 5.8 02/23/2017     No results  for input(s): POCTGLUCOSE in the last 72 hours.    Diagnostic Results:  Labs: Reviewed  ECG: Reviewed  X-Ray: Reviewed  Echo: Reviewed    ASSESSMENT & PLAN:     Current Problems List:  Active Hospital Problems    Diagnosis  POA    *NSTEMI (non-ST elevated myocardial infarction) [I21.4]  Yes    Chest pain [R07.9]  Yes      Resolved Hospital Problems    Diagnosis Date Resolved POA   No resolved problems to display.       HIGH RISK CONDITION(S):  Patient has a condition that poses threat to life and bodily function: Acute Myocardial Infarction    Problem Assessment & Treatment Plan:    NSTEMI  CAD native vessel native heart with angina  - Typical chest pain in high risk patient  - ECG: sinus bradycardia with PAC; Maisha 0.052  - ECHO 9/2016  CONCLUSIONS     1 - Normal left ventricular systolic function (EF 55-60%).     2 - Normal left ventricular diastolic function.     3 - Normal right ventricular systolic function .     4 - Concentric hypertrophy.   - DSE 4/2016 is negative  - Repeat 2D ECHO  - Manage as ACS with ASA/plavix/statin  - No BB due to bradycardia  - Will involve co-management for assistance in ischemic evaluation (stress testing v angiography)  - Less invasive strategy likely optimal in this patient as discussed    B/l pleural effusions  - Likely metastatic and not due to heart failure  - F/u CTS as outpatient for consideration of VATS +/- PleurX or pleurodesis, though a more conservative approach must be considered in v/o NSTEMI  - Can c/w lasix at home dosage    Pancreatic cancer, Stage IV  T4 compression fx  - s/p whipple 4/2016 w/ mets to liver and spine  - c/w pancreatic enzyme supplementation  - c/w palliative pain medication  - f/u with oncology/rad-onc for further discussion of treatment; of note, patient seen by neurosurgery prior for T4 compression fx and recommendation was made for radiation tx    DM2 with neuropathy on long term insulin  - well controlled, a1c 5.8  - c/w home regimen +  SSI    Hypothyroidism  - c/w home synthroid    DVT/GI ppx in place.  Full code.    Chad Larsen MD  [facility]

## 2017-03-08 NOTE — ED NOTES
Pt resting quietly on stretcher; remains on continuous cardiac and pulse ox monitoring with non-invasive blood pressure to cycle every 15 minutes.  VS improving; NRS noted. Bed locked in lowest position; side rails up and locked x 2; call light, bedside table, and personal belongings within reach.  Pt denies chest pain at this time; will continue to monitor pt.

## 2017-03-09 LAB
ALBUMIN SERPL BCP-MCNC: 3.1 G/DL
ALP SERPL-CCNC: 95 U/L
ALT SERPL W/O P-5'-P-CCNC: 16 U/L
ANION GAP SERPL CALC-SCNC: 7 MMOL/L
AST SERPL-CCNC: 22 U/L
BASOPHILS # BLD AUTO: 0.07 K/UL
BASOPHILS NFR BLD: 1.9 %
BILIRUB SERPL-MCNC: 0.4 MG/DL
BUN SERPL-MCNC: 10 MG/DL
CALCIUM SERPL-MCNC: 8.9 MG/DL
CHLORIDE SERPL-SCNC: 112 MMOL/L
CO2 SERPL-SCNC: 21 MMOL/L
CREAT SERPL-MCNC: 0.6 MG/DL
DIASTOLIC DYSFUNCTION: NO
DIFFERENTIAL METHOD: ABNORMAL
EOSINOPHIL # BLD AUTO: 0.1 K/UL
EOSINOPHIL NFR BLD: 2.4 %
ERYTHROCYTE [DISTWIDTH] IN BLOOD BY AUTOMATED COUNT: 19 %
EST. GFR  (AFRICAN AMERICAN): >60 ML/MIN/1.73 M^2
EST. GFR  (NON AFRICAN AMERICAN): >60 ML/MIN/1.73 M^2
ESTIMATED PA SYSTOLIC PRESSURE: 26.04
FACT X PPP CHRO-ACNC: 0.37 IU/ML
GLUCOSE SERPL-MCNC: 112 MG/DL
HCT VFR BLD AUTO: 31.4 %
HGB BLD-MCNC: 10 G/DL
LYMPHOCYTES # BLD AUTO: 0.6 K/UL
LYMPHOCYTES NFR BLD: 16.3 %
MAGNESIUM SERPL-MCNC: 1.6 MG/DL
MCH RBC QN AUTO: 30.9 PG
MCHC RBC AUTO-ENTMCNC: 31.8 %
MCV RBC AUTO: 97 FL
MITRAL VALVE MOBILITY: NORMAL
MONOCYTES # BLD AUTO: 0.7 K/UL
MONOCYTES NFR BLD: 18.4 %
NEUTROPHILS # BLD AUTO: 2.3 K/UL
NEUTROPHILS NFR BLD: 60.5 %
PHOSPHATE SERPL-MCNC: 4.2 MG/DL
PLATELET # BLD AUTO: 288 K/UL
PMV BLD AUTO: 10.4 FL
POCT GLUCOSE: 114 MG/DL (ref 70–110)
POCT GLUCOSE: 133 MG/DL (ref 70–110)
POCT GLUCOSE: 143 MG/DL (ref 70–110)
POCT GLUCOSE: 259 MG/DL (ref 70–110)
POTASSIUM SERPL-SCNC: 3.9 MMOL/L
PROT SERPL-MCNC: 6.2 G/DL
RBC # BLD AUTO: 3.24 M/UL
RETIRED EF AND QEF - SEE NOTES: 50 (ref 55–65)
SODIUM SERPL-SCNC: 140 MMOL/L
TRICUSPID VALVE REGURGITATION: NORMAL
TROPONIN I SERPL DL<=0.01 NG/ML-MCNC: 0.29 NG/ML
TROPONIN I SERPL DL<=0.01 NG/ML-MCNC: 0.41 NG/ML
TROPONIN I SERPL DL<=0.01 NG/ML-MCNC: 0.77 NG/ML
WBC # BLD AUTO: 3.74 K/UL

## 2017-03-09 PROCEDURE — 84484 ASSAY OF TROPONIN QUANT: CPT

## 2017-03-09 PROCEDURE — 83735 ASSAY OF MAGNESIUM: CPT

## 2017-03-09 PROCEDURE — 84484 ASSAY OF TROPONIN QUANT: CPT | Mod: 91

## 2017-03-09 PROCEDURE — 80053 COMPREHEN METABOLIC PANEL: CPT

## 2017-03-09 PROCEDURE — 25000003 PHARM REV CODE 250: Performed by: HOSPITALIST

## 2017-03-09 PROCEDURE — 93306 TTE W/DOPPLER COMPLETE: CPT | Mod: 26,,, | Performed by: INTERNAL MEDICINE

## 2017-03-09 PROCEDURE — 93005 ELECTROCARDIOGRAM TRACING: CPT

## 2017-03-09 PROCEDURE — 93010 ELECTROCARDIOGRAM REPORT: CPT | Mod: ,,, | Performed by: INTERNAL MEDICINE

## 2017-03-09 PROCEDURE — 99222 1ST HOSP IP/OBS MODERATE 55: CPT | Mod: ,,, | Performed by: INTERNAL MEDICINE

## 2017-03-09 PROCEDURE — 99233 SBSQ HOSP IP/OBS HIGH 50: CPT | Mod: ,,, | Performed by: HOSPITALIST

## 2017-03-09 PROCEDURE — 25000003 PHARM REV CODE 250: Performed by: STUDENT IN AN ORGANIZED HEALTH CARE EDUCATION/TRAINING PROGRAM

## 2017-03-09 PROCEDURE — 63600175 PHARM REV CODE 636 W HCPCS: Performed by: HOSPITALIST

## 2017-03-09 PROCEDURE — 85520 HEPARIN ASSAY: CPT

## 2017-03-09 PROCEDURE — 20600001 HC STEP DOWN PRIVATE ROOM

## 2017-03-09 PROCEDURE — 85025 COMPLETE CBC W/AUTO DIFF WBC: CPT

## 2017-03-09 PROCEDURE — 36415 COLL VENOUS BLD VENIPUNCTURE: CPT

## 2017-03-09 PROCEDURE — 93306 TTE W/DOPPLER COMPLETE: CPT

## 2017-03-09 PROCEDURE — 25000003 PHARM REV CODE 250: Performed by: INTERNAL MEDICINE

## 2017-03-09 PROCEDURE — 84100 ASSAY OF PHOSPHORUS: CPT

## 2017-03-09 PROCEDURE — 99253 IP/OBS CNSLTJ NEW/EST LOW 45: CPT | Mod: ,,, | Performed by: RADIOLOGY

## 2017-03-09 RX ORDER — ASCORBIC ACID 500 MG
500 TABLET ORAL 2 TIMES DAILY
COMMUNITY

## 2017-03-09 RX ORDER — CHOLECALCIFEROL (VITAMIN D3) 25 MCG
185 TABLET ORAL DAILY
COMMUNITY

## 2017-03-09 RX ORDER — METOPROLOL TARTRATE 25 MG/1
12.5 TABLET ORAL 2 TIMES DAILY
Status: DISCONTINUED | OUTPATIENT
Start: 2017-03-09 | End: 2017-03-10 | Stop reason: HOSPADM

## 2017-03-09 RX ORDER — ASPIRIN 81 MG/1
81 TABLET ORAL DAILY
COMMUNITY

## 2017-03-09 RX ADMIN — MORPHINE SULFATE 30 MG: 15 TABLET, EXTENDED RELEASE ORAL at 05:03

## 2017-03-09 RX ADMIN — PANCRELIPASE 3 CAPSULE: 24000; 76000; 120000 CAPSULE, DELAYED RELEASE PELLETS ORAL at 12:03

## 2017-03-09 RX ADMIN — CLOPIDOGREL 75 MG: 75 TABLET, FILM COATED ORAL at 08:03

## 2017-03-09 RX ADMIN — INSULIN ASPART 1 UNITS: 100 INJECTION, SOLUTION INTRAVENOUS; SUBCUTANEOUS at 09:03

## 2017-03-09 RX ADMIN — ASPIRIN 81 MG: 81 TABLET, COATED ORAL at 08:03

## 2017-03-09 RX ADMIN — SODIUM CHLORIDE, PRESERVATIVE FREE 3 ML: 5 INJECTION INTRAVENOUS at 02:03

## 2017-03-09 RX ADMIN — GABAPENTIN 300 MG: 300 CAPSULE ORAL at 05:03

## 2017-03-09 RX ADMIN — GABAPENTIN 300 MG: 300 CAPSULE ORAL at 09:03

## 2017-03-09 RX ADMIN — HEPARIN SODIUM AND DEXTROSE 20 UNITS/KG/HR: 10000; 5 INJECTION INTRAVENOUS at 02:03

## 2017-03-09 RX ADMIN — FUROSEMIDE 40 MG: 40 TABLET ORAL at 09:03

## 2017-03-09 RX ADMIN — HEPARIN SODIUM AND DEXTROSE 20 UNITS/KG/HR: 10000; 5 INJECTION INTRAVENOUS at 01:03

## 2017-03-09 RX ADMIN — METOCLOPRAMIDE 10 MG: 10 TABLET ORAL at 12:03

## 2017-03-09 RX ADMIN — PANCRELIPASE 3 CAPSULE: 24000; 76000; 120000 CAPSULE, DELAYED RELEASE PELLETS ORAL at 04:03

## 2017-03-09 RX ADMIN — METHADONE HYDROCHLORIDE 5 MG: 5 TABLET ORAL at 08:03

## 2017-03-09 RX ADMIN — METHADONE HYDROCHLORIDE 5 MG: 5 TABLET ORAL at 09:03

## 2017-03-09 RX ADMIN — METOCLOPRAMIDE 10 MG: 10 TABLET ORAL at 04:03

## 2017-03-09 RX ADMIN — FUROSEMIDE 40 MG: 40 TABLET ORAL at 08:03

## 2017-03-09 RX ADMIN — LEVOTHYROXINE SODIUM 88 MCG: 88 TABLET ORAL at 06:03

## 2017-03-09 RX ADMIN — GABAPENTIN 300 MG: 300 CAPSULE ORAL at 02:03

## 2017-03-09 RX ADMIN — INSULIN DETEMIR 4 UNITS: 100 INJECTION, SOLUTION SUBCUTANEOUS at 09:03

## 2017-03-09 RX ADMIN — METOCLOPRAMIDE 10 MG: 10 TABLET ORAL at 09:03

## 2017-03-09 RX ADMIN — ATORVASTATIN CALCIUM 40 MG: 20 TABLET, FILM COATED ORAL at 08:03

## 2017-03-09 RX ADMIN — METOCLOPRAMIDE 10 MG: 10 TABLET ORAL at 06:03

## 2017-03-09 RX ADMIN — MORPHINE SULFATE 30 MG: 15 TABLET, EXTENDED RELEASE ORAL at 09:03

## 2017-03-09 RX ADMIN — MORPHINE SULFATE 30 MG: 15 TABLET, EXTENDED RELEASE ORAL at 02:03

## 2017-03-09 RX ADMIN — CETIRIZINE HYDROCHLORIDE 5 MG: 5 TABLET, FILM COATED ORAL at 08:03

## 2017-03-09 RX ADMIN — PANTOPRAZOLE SODIUM 40 MG: 40 TABLET, DELAYED RELEASE ORAL at 08:03

## 2017-03-09 RX ADMIN — SODIUM CHLORIDE 500 ML: 0.9 INJECTION, SOLUTION INTRAVENOUS at 01:03

## 2017-03-09 NOTE — PLAN OF CARE
Problem: Patient Care Overview  Goal: Plan of Care Review  Outcome: Ongoing (interventions implemented as appropriate)  Heparin infusing. Next antiXa due @ 0733. Pt NPO. Pt has double mastectomy. ED nurse stated pt gave permission to use arms for BP checks,IV and blood draws. Plan of care reviewed with patient. Pt has no complaints of any pain or SOB. VSS. No acute events overnight. Will continue to monitor

## 2017-03-09 NOTE — PROGRESS NOTES
Progress Note  [facility]    Provider team: [facility]  Admit Date: 3/8/2017  Encounter Date: 03/09/2017     SUBJECTIVE:     Follow-up Visit for: NSTEMI (non-ST elevated myocardial infarction)    HPI (See H&P for complete P,F,SHx): 57F CAD s/p MI with 2 stents in place, breast cancer (s/p bilateral mastectomy and reconstruction), metastatic pancreatic cancer (s/p whipple 4/2016 w/ mets to liver and spine, b/l pleural effusion for which there are plans being undergone for bilateral VATS with drainage of effusion +/- PleurX or pleurodesis), DM2 on insulin, and hypothyroidism here for evaluation of chest pain. Patient presents today from a meeting with oncologists where she became very upset. She reports that there were discussions of potentially stopping chemotherapy as she wasn't responding as they would have liked. Patient had subsequent development of severe L-sided pressure-like chest pain radiating in jaw and LUE. Endorses nausea. Given 1 tab nitro prior to presentation, stated it did not help the pain. Given 2nd tab of nitro with subsequent improvement in pain. She states the pain probably lasted more than 30 minutes. She also reports about 2 months of GOLD after one block and about one week of bilateral LE edema. She denies orthopnea or PND. Stress echocardiogram and nuclear stress were negative in 2016.  The patient was noted to have mild elevation in troponin and ECG is not changed from prior. The patient is admitted to [facility] for further management of ACS - NSTEMI.  Cardiology has been following and recommending of conservative management given overall prognosis.  The patient is scheduled for outpatient CT surgery and this should be postponed (message sent to CT surgeon).    Interval history: Patient reports feeling well without further chest pain.  She does not have shortness of breath.  All questions answered to patient satisfaction.    Review of Systems:  Review of Systems   Constitutional:  "Negative for chills and fever.   HENT: Negative for congestion and sore throat.    Eyes: Negative for photophobia, pain and discharge.   Respiratory: Negative for cough, hemoptysis, sputum production and shortness of breath.    Cardiovascular: Negative for chest pain, palpitations and leg swelling.   Gastrointestinal: Negative for abdominal pain, diarrhea, nausea and vomiting.   Genitourinary: Negative for dysuria and urgency.   Musculoskeletal: Negative for myalgias and neck pain.   Skin: Negative for itching and rash.   Neurological: Negative for sensory change, focal weakness and headaches.   Endo/Heme/Allergies: Negative for polydipsia. Does not bruise/bleed easily.   Psychiatric/Behavioral: Negative for depression and suicidal ideas.       OBJECTIVE:     Intake/Output Summary (Last 24 hours) at 03/09/17 0820  Last data filed at 03/09/17 0500   Gross per 24 hour   Intake                0 ml   Output              600 ml   Net             -600 ml     Vital Signs Range (Last 24H):  Temp:  [97.7 °F (36.5 °C)-97.8 °F (36.6 °C)]   Pulse:  [49-81]   Resp:  [13-27]   BP: ()/(45-85)   SpO2:  [95 %-100 %]   Body mass index is 20.48 kg/(m^2).    Objective:  General Appearance:  Comfortable, well-appearing, in no acute distress and not in pain.    Vital signs: (most recent): Blood pressure 114/62, pulse 64, temperature 97.8 °F (36.6 °C), temperature source Oral, resp. rate 18, height 4' 11" (1.499 m), weight 46 kg (101 lb 6.6 oz), SpO2 98 %, not currently breastfeeding.  No fever.    Output: Producing urine and producing stool.    HEENT: Normal HEENT exam.    Lungs:  Normal effort.  She is not in respiratory distress.  Breath sounds clear to auscultation.  No wheezes or rales.    Heart: Normal rate.  Regular rhythm.  S1 normal and S2 normal.  No murmur.   Chest: Symmetric chest wall expansion.   Extremities: Normal range of motion.  There is no deformity, effusion, local swelling or dependent edema.    Neurological: " Patient is alert and oriented to person, place and time.  Normal strength.    Skin:  Warm and dry.    Abdomen: Abdomen is soft.  Bowel sounds are normal.   There is no abdominal tenderness.     Pupils:  Pupils are equal, round, and reactive to light.    Pulses: Distal pulses are intact.      Medications:  Medication list was reviewed in EPIC and changes noted under Assessment/Plan and MAR.    Laboratory:  Recent Labs      03/09/17   0705   WBC  3.74*   RBC  3.24*   HGB  10.0*   HCT  31.4*   PLT  288   MCV  97   MCH  30.9   MCHC  31.8*   GRAN  60.5  2.3   LYMPH  16.3*  0.6*   MONO  18.4*  0.7   EOS  0.1      Recent Labs      03/09/17   0705   GLU  112*   NA  140   K  3.9   CL  112*   CO2  21*   BUN  10   CREATININE  0.6   CALCIUM  8.9   ANIONGAP  7*   MG  1.6   PHOS  4.2       ASSESSMENT/PLAN:     Active Hospital Problems    Diagnosis  POA    *NSTEMI (non-ST elevated myocardial infarction) [I21.4]  Yes    Chest pain [R07.9]  Yes      Resolved Hospital Problems    Diagnosis Date Resolved POA   No resolved problems to display.      NSTEMI  CAD native vessel native heart with angina  - Typical chest pain in high risk patient  - ECG: sinus bradycardia with PAC  - Maisha peak to 1.2, now downtrending  - ECHO 3/9  CONCLUSIONS     1 - Low normal to mildly depressed left ventricular systolic function (EF 50-55%).     2 - Normal right ventricular systolic function .     3 - Normal left ventricular diastolic function.     4 - Mild tricuspid regurgitation.     5 - Bilateral pleural effusion.   - DSE 4/2016 is negative  - Repeat 2D ECHO: The following segments were hypokinetic: mid anterolateral wall, basal anterolateral wall, mid anterior wall.  - Manage as ACS with ASA/plavix/statin  - C/w IV UFH for 48 hours total  - Can add metoprolol 12.5 BID  - No present indication for ACE-I  - Less invasive strategy likely optimal in this patient, as discussed; cardiology consult agrees with approach     B/l pleural effusions  - Likely  metastatic and not due to heart failure as nl CVP  - I have messaged CTS to defer VATS +/- PleurX or pleurodesis as a more conservative approach must be considered in v/o NSTEMI  - Can c/w lasix at home dosage     Pancreatic cancer, Stage IV  T4 compression fx  - s/p whipple 4/2016 w/ mets to liver and spine  - c/w pancreatic enzyme supplementation  - c/w palliative pain medication  - f/u with oncology/rad-onc for further discussion of treatment; of note, patient seen by neurosurgery prior for T4 compression fx and recommendation was made for radiation tx   - inpatient consult to radiation oncology placed 3/9 per rad-onc requests     DM2 with neuropathy on long term insulin  - well controlled, a1c 5.8  - c/w home regimen + SSI     Hypothyroidism  - c/w home synthroid     Anticipated discharge date and disposition:   Evaluate for d/c home 3/10-11.  Chad Larsen MD  [facility]

## 2017-03-09 NOTE — PLAN OF CARE
03/09/17 1045   Discharge Assessment   Assessment Type Discharge Planning Assessment   Confirmed/corrected address and phone number on facesheet? Yes   Assessment information obtained from? Caregiver;Medical Record   Expected Length of Stay (days) 2   Communicated expected length of stay with patient/caregiver yes   Prior to hospitilization cognitive status: Alert/Oriented   Prior to hospitalization functional status: Assistive Equipment;Needs Assistance   Current cognitive status: Alert/Oriented   Current Functional Status: Assistive Equipment;Needs Assistance   Arrived From home or self-care   Lives With spouse   Able to Return to Prior Arrangements yes   Is patient able to care for self after discharge? No   How many people do you have in your home that can help with your care after discharge? 1   Who are your caregiver(s) and their phone number(s)? , Georges  [phone]   Readmission Within The Last 30 Days no previous admission in last 30 days   Patient currently being followed by outpatient case management? No   Patient currently receives home health services? No   Does the patient currently use HME? Yes   Patient currently receives private duty nursing? No   Patient currently receives any other outside agency services? No   Equipment Currently Used at Home wheelchair   Do you have any problems affording any of your prescribed medications? No   Is the patient taking medications as prescribed? yes   Do you have any financial concerns preventing you from receiving the healthcare you need? No   Does the patient have transportation to healthcare appointments? Yes   Transportation Available family or friend will provide   On Dialysis? No   Does the patient receive services at the Coumadin Clinic? No   Are there any open cases? No   Discharge Plan A Home with family;Home Health   Patient/Family In Agreement With Plan yes   Admityted with NSTEMI. s/p MI with 2 stents in place, breast cancer (s/p bilateral  mastectomy and reconstruction), metastatic pancreatic cancer (s/p whipple 4/2016 w/ mets to liver and spine, b/l pleural effusion for which there are plans being undergone for bilateral VATS with drainage of effusion +/- PleurX or pleurodesis), DM2 on insulin, and hypothyroidism.  is her caregiver and has been managing OK thus far. He is concerned that there be more medications than he can manage. CM discussed HHC with [facility] program and requested SW to assist in referral. No other DC needs identified.

## 2017-03-09 NOTE — CONSULTS
The cardiology consult was completed by Dr. Gerardo Boyle earlier today. Please see the note.  The patient will be followed by co-managed team.

## 2017-03-09 NOTE — PROGRESS NOTES
Spoke to MD Chava about needing a nursing note about pt. being able to use arms for IV access and lab draw. MD stated he'll put note in.

## 2017-03-09 NOTE — PROGRESS NOTES
Patient transferred to CSU. Patient arrived to floor via stretcher with RN, no evidence of distress. Heparin and fluid bolus infusing upon arrival. Patient placed on tele. Vital signs obtained. Patient voices no complaints at this time. Plan of care initiated with patient. Bed in lowest position, locked, SR up x2, call bell in reach. Will continue to monitor patient.

## 2017-03-09 NOTE — CONSULTS
Inpatient consult to Radiation Oncology  Consult performed by: FELICIANO GOEL  Consult ordered by: FLORENCIO LOPEZ      DIAGNOSIS:  Metastatic pancreatic cancer    HISTORY OF PRESENT ILLNESS:  Ms. Whitley is a 57-year-old female with history of breast cancer and pancreatic cancer who is well-known to me, now with metastatic disease involving the spine and liver.  She has history of radiation to the pancreatic region for 5040 cGy concurrent with 5-FU which ended on March 3, 2016.  After that, she underwent Whipple procedure on April 12, 2016.  She was noted to have elevation in CEA and repeat scans revealed liver metastasis upon biopsy.  An MRI of the thoracic spine due to worsening back pain revealed abnormality compression deformity at T4 with additional changes within T5 T8 and T9 consistent with metastasis.  She received palliative radiation to the area between T3 and T6 for a total dose of 3000 cGy which ended on December 22, 2016.  An MRI of the C-spine on March 5, 2017 revealed metastatic disease involving C5, C6, C7, T1, T3, T4, and T5.  There is still compression fracture at T4.  She is referred for palliative radiation to the C-spine.    Unfortunately, patient presented to the ER yesterday with acute chest pain and was diagnosed with MI.  On most recent imaging, she is also noted to have worsening bilateral pleural effusions for which she was evaluated by Dr. Deluna.  She is currently planned for thoracentesis on March 16, 2017.  Radiation oncology is consulted regarding palliative radiation to C-spine while she is inpatient.    At present, she is recovering from the MI.  She reports 0 out of 10 pain involving the neck.  She also denies numbness, tingling, weakness, or bladder or bowel incontinence.    PHYSICAL EXAMINATION:  GENERAL: Patient is alert and oriented, in no acute distress.  HEENT: Extraocular muscles are intact.  Oropharynx is clear without lesions.  There is no cervical or  supraclavicular lymphadenopathy palpated.  No thyromegaly noted.  HEART: Regular rate and rhythm.  LUNGS: Clear to auscultation bilaterally.  ABDOMEN: Soft, nontender, nondistended, without hepatosplenomegaly.  Normoactive bowel sounds.  EXTREMITIES: No clubbing, cyanosis, or edema.  NEUROLOGICAL: Cranial nerve II through XII grossly intact.  Sensation is intact.  Strength is 5 out of 5 in the upper and lower extremities bilaterally.    ASSESSMENT:  This is a 57-year-old female with history of radiation to the pancreas and thoracic spine (T3-T6), now with evidence of C-spine metastasis who is currently admitted for MI.    PLAN:  After review of the images of the MRI of the C-spine, she is noted to have metastatic disease.  She recently completed palliative radiation to the area between T3 -T6 with good pain control at present.  I had a long discussion with the patient and her  regarding the role of palliative radiation to the C-spine.  Since she is currently asymptomatic in the neck, she would like to hold off on that due to the recent admission and planned thoracentesis.  If she develops pain or worsening symptoms, she will benefit from palliative radiation at that time and she will contact us at that time.  Contact information for office was given to the patient and her .

## 2017-03-09 NOTE — PROGRESS NOTES
Cardiology Progress Note    Subjective:     Interval History: Ms. Whitley was admitted yesterday with the diagnosis of NSTEMI after presenting to the ED with left sided chest pain radiating to her jaw and left arm. The pain improved after taking the second sublingual nitro and completely resolved within couple hours. Chest pain free today. Still on heparin infusion.    - The patient reports exertional dyspnea at baseline (NYHA Class II-III). She has also been having bilateral leg edema which improved with lasix.     - She has not had any cardiac events since her stent placement 6 years ago.      Meds:     Scheduled Meds:   aspirin  81 mg Oral Daily    atorvastatin  40 mg Oral Daily    cetirizine  5 mg Oral Daily    clopidogrel  75 mg Oral Daily    furosemide  40 mg Oral BID    gabapentin  300 mg Oral TID    insulin detemir  4 Units Subcutaneous QHS    levothyroxine  88 mcg Oral Daily    lipase-protease-amylase 24,000-76,000-120,000 units  3 capsule Oral TID WM    methadone  5 mg Oral Q12H    metoclopramide HCl  10 mg Oral QID (AC & HS)    morphine  30 mg Oral Q8H    pantoprazole  40 mg Oral Daily    sodium chloride 0.9%  3 mL Intravenous Q8H     PRN Meds:dextrose 50%, dextrose 50%, glucagon (human recombinant), glucose, glucose, heparin (PORCINE), heparin (PORCINE), hydrocodone-acetaminophen 5-325mg, insulin aspart, nitroGLYCERIN, ondansetron, senna-docusate 8.6-50 mg  Continuous Infusions:   heparin (porcine) in D5W 20 Units/kg/hr (03/09/17 0133)       Physical Exam:     Vitals:  Temp:  [97.6 °F (36.4 °C)-97.9 °F (36.6 °C)]   Pulse:  [49-81]   Resp:  [13-27]   BP: ()/(45-85)   SpO2:  [95 %-100 %]   I/O's:    Intake/Output Summary (Last 24 hours) at 03/09/17 1202  Last data filed at 03/09/17 0500   Gross per 24 hour   Intake                0 ml   Output              600 ml   Net             -600 ml        Constitutional:  NAD, conversant, underweight  HEENT:   Sclera anicteric  Neck:   No  JVD  CV:   RRR, no murmurs / rubs / gallops, normal S1/S2  Pulm:   Decreased breath sounds bibasilar with dullness on percussion.   GI:   Abdomen soft, NTND, +BS  Extremities:  Trace periankle edema  Skin:   No ecchymosis, erythema, or ulcers  Neuro:   AAOX3, no focal motor deficits      Labs:     Recent Results (from the past 336 hour(s))   CBC auto differential    Collection Time: 03/09/17  7:05 AM   Result Value Ref Range    WBC 3.74 (L) 3.90 - 12.70 K/uL    Hemoglobin 10.0 (L) 12.0 - 16.0 g/dL    Hematocrit 31.4 (L) 37.0 - 48.5 %    Platelets 288 150 - 350 K/uL   CBC auto differential    Collection Time: 03/08/17  1:58 PM   Result Value Ref Range    WBC 5.66 3.90 - 12.70 K/uL    Hemoglobin 11.8 (L) 12.0 - 16.0 g/dL    Hematocrit 36.4 (L) 37.0 - 48.5 %    Platelets 367 (H) 150 - 350 K/uL       No results found for this or any previous visit (from the past 336 hour(s)).      Recent Labs  Lab 03/08/17  1358 03/08/17  2041 03/09/17  0131 03/09/17  0705   TROPONINI 0.052* 1.186* 0.771* 0.413*       Estimated Creatinine Clearance: 75.1 mL/min (based on Cr of 0.6).    Imaging:  CXR: Bilateral pleural effusion    EKG:   Sinus bradycardia, no ischemic changes.    Telemetry:  Telemetry currently shows: Sinus rhythm    Echo 3/9/17  The following segments were hypokinetic: mid anterolateral wall, basal anterolateral wall, mid anterior wall.   1 - Low normal to mildly depressed left ventricular systolic function (EF 50-55%).     2 - Normal right ventricular systolic function .     3 - Normal left ventricular diastolic function.     4 - Mild tricuspid regurgitation.     5 - Bilateral pleural effusion.           Assessment & Plan:     57F CAD s/p MI with 2 stents in place, breast cancer (s/p bilateral mastectomy and reconstruction), metastatic pancreatic cancer (s/p whipple 4/2016 w/ mets to liver and spine, b/l pleural effusion for which there are plans being undergone for bilateral VATS with drainage of effusion +/- PleurX  or pleurodesis), DM2 on insulin, and hypothyroidism who is admitted with NSTEMI. Cardiology service is consulted for    1) NSTEMI  - Typical angina + troponin elevation.  - SHAILESH: 3.  - Chest pain free today and troponin trended down.  - Decision was made to treat medically without intervention because stage 4 pancreatic cancer non-responsive to chemotherapy.  - Currently on heparin, aspirin and plavix.  - Continue with heparin for 24 hours more.  - Focal wall motion abnormalities on echo.  - Continue aspirin and statin. Will discuss with the patient about plavix. Patient has h/o GI bleeding. The source could not be identified as per the patient.  - May add metoprolol 12.5 mg BID. Not a candidate for ACEI for now.    2) Bilateral Pleural Effusion / Need For VATS  - Patient was scheduled for surgery this week.  - Would post-pone the surgery for now.  - Would decide on the timing of surgery after her follow up with cardiology as an outpatient.    3) Leg Edema / Exertional Dyspnea  - Etiology unclear. Differential: low albumin, diastolic dysfunction, pleural effusion.  - Continue with home dose of lasix for now.    I will discuss with the attending physician. Attending addendum is to follow.    Signed:  Tanner Leos MD  Cardiology Fellow, PGY-VI  Pager: [phone]  3/9/2017 12:02 PM    Attending Addendum:

## 2017-03-10 VITALS
OXYGEN SATURATION: 98 % | DIASTOLIC BLOOD PRESSURE: 85 MMHG | TEMPERATURE: 97 F | RESPIRATION RATE: 18 BRPM | BODY MASS INDEX: 20.26 KG/M2 | HEART RATE: 76 BPM | HEIGHT: 59 IN | WEIGHT: 100.5 LBS | SYSTOLIC BLOOD PRESSURE: 122 MMHG

## 2017-03-10 LAB
ALBUMIN SERPL BCP-MCNC: 3 G/DL
ALP SERPL-CCNC: 87 U/L
ALT SERPL W/O P-5'-P-CCNC: 13 U/L
ANION GAP SERPL CALC-SCNC: 11 MMOL/L
AST SERPL-CCNC: 18 U/L
BASOPHILS # BLD AUTO: 0.04 K/UL
BASOPHILS NFR BLD: 1.2 %
BILIRUB SERPL-MCNC: 0.3 MG/DL
BUN SERPL-MCNC: 15 MG/DL
CALCIUM SERPL-MCNC: 8.8 MG/DL
CHLORIDE SERPL-SCNC: 107 MMOL/L
CO2 SERPL-SCNC: 23 MMOL/L
CREAT SERPL-MCNC: 0.7 MG/DL
DIFFERENTIAL METHOD: ABNORMAL
EOSINOPHIL # BLD AUTO: 0.1 K/UL
EOSINOPHIL NFR BLD: 2.9 %
ERYTHROCYTE [DISTWIDTH] IN BLOOD BY AUTOMATED COUNT: 18.2 %
EST. GFR  (AFRICAN AMERICAN): >60 ML/MIN/1.73 M^2
EST. GFR  (NON AFRICAN AMERICAN): >60 ML/MIN/1.73 M^2
FACT X PPP CHRO-ACNC: 0.37 IU/ML
GLUCOSE SERPL-MCNC: 146 MG/DL
HCT VFR BLD AUTO: 29.7 %
HGB BLD-MCNC: 9.8 G/DL
LYMPHOCYTES # BLD AUTO: 0.7 K/UL
LYMPHOCYTES NFR BLD: 21.2 %
MAGNESIUM SERPL-MCNC: 1.5 MG/DL
MCH RBC QN AUTO: 31.2 PG
MCHC RBC AUTO-ENTMCNC: 33 %
MCV RBC AUTO: 95 FL
MONOCYTES # BLD AUTO: 0.5 K/UL
MONOCYTES NFR BLD: 14.4 %
NEUTROPHILS # BLD AUTO: 2 K/UL
NEUTROPHILS NFR BLD: 60 %
PHOSPHATE SERPL-MCNC: 5.2 MG/DL
PLATELET # BLD AUTO: 264 K/UL
PMV BLD AUTO: 10.2 FL
POCT GLUCOSE: 163 MG/DL (ref 70–110)
POTASSIUM SERPL-SCNC: 3.7 MMOL/L
PROT SERPL-MCNC: 6.1 G/DL
RBC # BLD AUTO: 3.14 M/UL
SODIUM SERPL-SCNC: 141 MMOL/L
WBC # BLD AUTO: 3.4 K/UL

## 2017-03-10 PROCEDURE — 25000003 PHARM REV CODE 250: Performed by: STUDENT IN AN ORGANIZED HEALTH CARE EDUCATION/TRAINING PROGRAM

## 2017-03-10 PROCEDURE — 80053 COMPREHEN METABOLIC PANEL: CPT

## 2017-03-10 PROCEDURE — 93010 ELECTROCARDIOGRAM REPORT: CPT | Mod: ,,, | Performed by: INTERNAL MEDICINE

## 2017-03-10 PROCEDURE — 83735 ASSAY OF MAGNESIUM: CPT

## 2017-03-10 PROCEDURE — 85025 COMPLETE CBC W/AUTO DIFF WBC: CPT

## 2017-03-10 PROCEDURE — 93005 ELECTROCARDIOGRAM TRACING: CPT

## 2017-03-10 PROCEDURE — 85520 HEPARIN ASSAY: CPT

## 2017-03-10 PROCEDURE — 63600175 PHARM REV CODE 636 W HCPCS: Performed by: HOSPITALIST

## 2017-03-10 PROCEDURE — 25000003 PHARM REV CODE 250: Performed by: HOSPITALIST

## 2017-03-10 PROCEDURE — 36415 COLL VENOUS BLD VENIPUNCTURE: CPT

## 2017-03-10 PROCEDURE — 84100 ASSAY OF PHOSPHORUS: CPT

## 2017-03-10 PROCEDURE — 99239 HOSP IP/OBS DSCHRG MGMT >30: CPT | Mod: ,,, | Performed by: HOSPITALIST

## 2017-03-10 PROCEDURE — 99232 SBSQ HOSP IP/OBS MODERATE 35: CPT | Mod: ,,, | Performed by: INTERNAL MEDICINE

## 2017-03-10 RX ORDER — METOPROLOL TARTRATE 25 MG/1
12.5 TABLET, FILM COATED ORAL 2 TIMES DAILY
Qty: 30 TABLET | Refills: 2 | Status: SHIPPED | OUTPATIENT
Start: 2017-03-10 | End: 2017-06-08

## 2017-03-10 RX ORDER — CLOPIDOGREL BISULFATE 75 MG/1
75 TABLET ORAL DAILY
Qty: 30 TABLET | Refills: 11 | Status: ON HOLD | OUTPATIENT
Start: 2017-03-10 | End: 2017-05-11 | Stop reason: HOSPADM

## 2017-03-10 RX ORDER — MAGNESIUM SULFATE HEPTAHYDRATE 40 MG/ML
2 INJECTION, SOLUTION INTRAVENOUS ONCE
Status: COMPLETED | OUTPATIENT
Start: 2017-03-10 | End: 2017-03-10

## 2017-03-10 RX ORDER — NITROGLYCERIN 0.4 MG/1
0.4 TABLET SUBLINGUAL EVERY 5 MIN PRN
Qty: 30 TABLET | Refills: 0 | Status: SHIPPED | OUTPATIENT
Start: 2017-03-10

## 2017-03-10 RX ORDER — PANTOPRAZOLE SODIUM 40 MG/1
40 TABLET, DELAYED RELEASE ORAL DAILY
Qty: 30 TABLET | Refills: 2 | Status: SHIPPED | OUTPATIENT
Start: 2017-03-10 | End: 2017-06-08

## 2017-03-10 RX ORDER — ATORVASTATIN CALCIUM 40 MG/1
40 TABLET, FILM COATED ORAL DAILY
Qty: 30 TABLET | Refills: 2 | Status: SHIPPED | OUTPATIENT
Start: 2017-03-10 | End: 2017-06-08

## 2017-03-10 RX ADMIN — FUROSEMIDE 40 MG: 40 TABLET ORAL at 08:03

## 2017-03-10 RX ADMIN — MORPHINE SULFATE 30 MG: 15 TABLET, EXTENDED RELEASE ORAL at 05:03

## 2017-03-10 RX ADMIN — PANTOPRAZOLE SODIUM 40 MG: 40 TABLET, DELAYED RELEASE ORAL at 08:03

## 2017-03-10 RX ADMIN — ATORVASTATIN CALCIUM 40 MG: 20 TABLET, FILM COATED ORAL at 08:03

## 2017-03-10 RX ADMIN — GABAPENTIN 300 MG: 300 CAPSULE ORAL at 01:03

## 2017-03-10 RX ADMIN — LEVOTHYROXINE SODIUM 88 MCG: 88 TABLET ORAL at 05:03

## 2017-03-10 RX ADMIN — METHADONE HYDROCHLORIDE 5 MG: 5 TABLET ORAL at 08:03

## 2017-03-10 RX ADMIN — GABAPENTIN 300 MG: 300 CAPSULE ORAL at 05:03

## 2017-03-10 RX ADMIN — PANCRELIPASE 3 CAPSULE: 24000; 76000; 120000 CAPSULE, DELAYED RELEASE PELLETS ORAL at 08:03

## 2017-03-10 RX ADMIN — METOCLOPRAMIDE 10 MG: 10 TABLET ORAL at 10:03

## 2017-03-10 RX ADMIN — MAGNESIUM SULFATE IN WATER 2 G: 40 INJECTION, SOLUTION INTRAVENOUS at 10:03

## 2017-03-10 RX ADMIN — MORPHINE SULFATE 30 MG: 15 TABLET, EXTENDED RELEASE ORAL at 01:03

## 2017-03-10 RX ADMIN — ASPIRIN 81 MG: 81 TABLET, COATED ORAL at 08:03

## 2017-03-10 RX ADMIN — PANCRELIPASE 3 CAPSULE: 24000; 76000; 120000 CAPSULE, DELAYED RELEASE PELLETS ORAL at 12:03

## 2017-03-10 RX ADMIN — CETIRIZINE HYDROCHLORIDE 5 MG: 5 TABLET, FILM COATED ORAL at 08:03

## 2017-03-10 RX ADMIN — CLOPIDOGREL 75 MG: 75 TABLET, FILM COATED ORAL at 08:03

## 2017-03-10 RX ADMIN — METOCLOPRAMIDE 10 MG: 10 TABLET ORAL at 05:03

## 2017-03-10 NOTE — PLAN OF CARE
[facility]    HOME HEALTH ORDERS  FACE TO FACE ENCOUNTER    Patient Name: Lashay Whitley  YOB: 1960    PCP: April Kelsey MD   PCP Address: [address] [facility] [place] [address]  PCP Phone Number: [phone]  PCP Fax: [phone]    Encounter Date: 03/10/2017    Admit to Home Health    Diagnoses:  Active Hospital Problems    Diagnosis  POA    *NSTEMI (non-ST elevated myocardial infarction) [I21.4]  Yes    Chest pain [R07.9]  Yes      Resolved Hospital Problems    Diagnosis Date Resolved POA   No resolved problems to display.       Future Appointments  Date Time Provider Department Center   3/21/2017 8:00 AM Tanner Leos MD [facility] [address] [address]     Follow-up Information     Follow up with Tanner Leos MD On 3/21/2017.    Specialty:  Cardiovascular Disease    Why:  08:00 Cardiology appointment    Contact information:    [address]  [facility] [address]  [phone]            I have seen and examined this patient face to face today. My clinical findings that support the need for the home health skilled services and home bound status are the following:  Weakness/numbness causing balance and gait disturbance due to Coronary Heart Disease making it taxing to leave home.    Allergies:  Review of patient's allergies indicates:   Allergen Reactions    Penicillins Swelling    Demerol [meperidine] Swelling    Dilaudid [hydromorphone (bulk)] Other (See Comments)     redness    Percocet [oxycodone-acetaminophen] Swelling    Adhesive tape-silicones Rash       Diet: cardiac diet, diabetic diet: 2000 calorie and fluid restriction: 1500    Activities: activity as tolerated    Nursing:   SN to complete comprehensive assessment including routine vital signs. Instruct on disease process and s/s of complications to report to MD. Review/verify medication list sent home with the patient at time of discharge  and instruct patient/caregiver as needed. Frequency may be  "adjusted depending on start of care date.    Notify MD if SBP > 160 or < 90; DBP > 90 or < 50; HR > 120 or < 50; Temp > 101    CONSULTS:    Physical Therapy to evaluate and treat. Evaluate for home safety and equipment needs; Establish/upgrade home exercise program. Perform / instruct on therapeutic exercises, gait training, transfer training, and Range of Motion.  Occupational Therapy to evaluate and treat. Evaluate home environment for safety and equipment needs. Perform/Instruct on transfers, ADL training, ROM, and therapeutic exercises.   to evaluate for community resources/long-range planning.  Aide to provide assistance with personal care, ADLs, and vital signs.    Medications: Review discharge medications with patient and family and provide education.      Current Discharge Medication List      START taking these medications    Details   atorvastatin (LIPITOR) 40 MG tablet Take 1 tablet (40 mg total) by mouth once daily.  Qty: 30 tablet, Refills: 2      clopidogrel (PLAVIX) 75 mg tablet Take 1 tablet (75 mg total) by mouth once daily.  Qty: 30 tablet, Refills: 11      metoprolol tartrate (LOPRESSOR) 25 MG tablet Take 0.5 tablets (12.5 mg total) by mouth 2 (two) times daily.  Qty: 30 tablet, Refills: 2      nitroGLYCERIN (NITROSTAT) 0.4 MG SL tablet Place 1 tablet (0.4 mg total) under the tongue every 5 (five) minutes as needed for Chest pain.  Qty: 30 tablet, Refills: 0      pantoprazole (PROTONIX) 40 MG tablet Take 1 tablet (40 mg total) by mouth once daily.  Qty: 30 tablet, Refills: 2         CONTINUE these medications which have NOT CHANGED    Details   ascorbic acid, vitamin C, (VITAMIN C) 500 MG tablet Take 500 mg by mouth 2 (two) times daily.      aspirin (ECOTRIN) 81 MG EC tablet Take 81 mg by mouth once daily.      BACILLUS COAGULANS (PROBIOTIC, B. COAGULANS, ORAL) Take by mouth.      BD ULTRA-FINE EDWIN PEN NEEDLES 32 x 5/32 " Ndle Uses 5 times daily, on multiple daily insulin " injections  Qty: 150 each, Refills: 12    Associated Diagnoses: Type 2 diabetes mellitus with hyperglycemia      furosemide (LASIX) 40 MG tablet Take 1 tablet (40 mg total) by mouth 2 (two) times daily.  Qty: 60 tablet, Refills: 1    Associated Diagnoses: Edema, unspecified type      gabapentin (NEURONTIN) 300 MG capsule Take 1 capsule (300 mg total) by mouth 3 (three) times daily.  Qty: 90 capsule, Refills: 2    Associated Diagnoses: Neuropathic pain      insulin aspart (NOVOLOG FLEXPEN) 100 unit/mL InPn pen Inject 6 Units into the skin 3 (three) times daily with meals. With correction scale for 33 units max TDD  Qty: 1 Box, Refills: 6    Associated Diagnoses: Type 2 diabetes mellitus without complication, with long-term current use of insulin      insulin glargine, TOUJEO, (TOUJEO SOLOSTAR) 300 unit/mL (1.5 mL) InPn pen Inject 4 Units into the skin 2 (two) times daily.  Qty: 1 Syringe, Refills: 12      KRILL OIL ORAL Take by mouth once daily.      levothyroxine (SYNTHROID) 88 MCG tablet Take 1 tablet (88 mcg total) by mouth once daily.  Qty: 90 tablet, Refills: 3    Associated Diagnoses: Hypothyroidism (acquired)      lipase-protease-amylase 36,000-114,000- 180,000 unit CpDR Take 3 capsules by mouth 3 (three) times daily with meals.  Qty: 270 capsule, Refills: 5      loratadine (CLARITIN) 10 mg tablet Take 10 mg by mouth once daily.      methadone (DOLOPHINE) 5 MG tablet Take 1 tablet (5 mg total) by mouth every 12 (twelve) hours.  Qty: 60 tablet, Refills: 0    Associated Diagnoses: Pancreatic adenocarcinoma; Liver metastases; Thoracic compression fracture, closed, initial encounter      metoclopramide HCl (REGLAN) 10 MG tablet Take 1 tablet (10 mg total) by mouth 4 (four) times daily before meals and nightly.  Qty: 90 tablet, Refills: 1    Associated Diagnoses: History of pancreatic cancer      morphine (MS CONTIN) 15 MG 12 hr tablet Take 3 tablets (45 mg total) by mouth every 8 (eight) hours.  Qty: 270 tablet,  Refills: 0    Associated Diagnoses: History of pancreatic cancer      multivitamin capsule Take 1 capsule by mouth once daily.      ondansetron (ZOFRAN-ODT) 8 MG TbDL Take 1 tablet (8 mg total) by mouth every 6 (six) hours as needed.  Qty: 100 tablet, Refills: 1    Associated Diagnoses: Nausea and vomiting, intractability of vomiting not specified, unspecified vomiting type      senna-docusate 8.6-50 mg (PERICOLACE) 8.6-50 mg per tablet Take 1 tablet by mouth daily as needed for Constipation.      vitamin D 1000 units Tab Take 185 mg by mouth once daily.      diazePAM (VALIUM) 5 MG tablet Take 1 tablet (5 mg total) by mouth every 12 (twelve) hours as needed (muscle spasms).  Qty: 30 tablet, Refills: 1    Associated Diagnoses: Insomnia, unspecified type      hydrocodone-acetaminophen 5-325mg (NORCO) 5-325 mg per tablet Take 1 tablet by mouth every 6 (six) hours as needed for Pain.      nicotine (NICODERM CQ) 14 mg/24 hr Place 1 patch onto the skin once daily.  Qty: 30 patch, Refills: 3    Associated Diagnoses: Tobacco abuse             I certify that this patient is confined to her home and needs intermittent skilled nursing care, physical therapy and occupational therapy.

## 2017-03-10 NOTE — PLAN OF CARE
Problem: Patient Care Overview  Goal: Plan of Care Review  Outcome: Ongoing (interventions implemented as appropriate)  Pt remains free of falls and injury this shift, pt ambulates independently. Vital signs stable, SR/ST on the montior. Plan of care reviewed with patient, verbalized understanding.Pt getting heparin gtt; anti XA therapeutic. Pt went ofr 2D echo; EF 50%. No signs of acute distress noted. Will continue to monitor.

## 2017-03-10 NOTE — PROGRESS NOTES
Cardiology Progress Note    Subjective:     Interval History: Ms. Whitley feels OK today. She denies any chest pain since the admission. No bleeding issues.  is at bed-side and discussed about the treatment plan in detail.      Meds:     Scheduled Meds:   aspirin  81 mg Oral Daily    atorvastatin  40 mg Oral Daily    cetirizine  5 mg Oral Daily    clopidogrel  75 mg Oral Daily    furosemide  40 mg Oral BID    gabapentin  300 mg Oral TID    insulin detemir  4 Units Subcutaneous QHS    levothyroxine  88 mcg Oral Daily    lipase-protease-amylase 24,000-76,000-120,000 units  3 capsule Oral TID WM    methadone  5 mg Oral Q12H    metoclopramide HCl  10 mg Oral QID (AC & HS)    metoprolol tartrate  12.5 mg Oral BID    morphine  30 mg Oral Q8H    pantoprazole  40 mg Oral Daily    sodium chloride 0.9%  3 mL Intravenous Q8H     PRN Meds:dextrose 50%, dextrose 50%, glucagon (human recombinant), glucose, glucose, heparin (PORCINE), heparin (PORCINE), hydrocodone-acetaminophen 5-325mg, insulin aspart, nitroGLYCERIN, ondansetron, senna-docusate 8.6-50 mg  Continuous Infusions:   heparin (porcine) in D5W 20 Units/kg/hr (03/09/17 1448)       Physical Exam:     Vitals:  Temp:  [97.2 °F (36.2 °C)-98.6 °F (37 °C)]   Pulse:  [64-99]   Resp:  [16-20]   BP: ()/(51-64)   SpO2:  [95 %-98 %]   I/O's:    Intake/Output Summary (Last 24 hours) at 03/10/17 1013  Last data filed at 03/10/17 0527   Gross per 24 hour   Intake          1749.99 ml   Output             2225 ml   Net          -475.01 ml        Constitutional:  NAD, conversant, underweight  HEENT:   Sclera anicteric  Neck:   No JVD  CV:   RRR, no murmurs / rubs / gallops, normal S1/S2  Pulm:   Decreased breath sounds right base with dullness on percussion.   GI:   Abdomen soft, NTND, +BS  Extremities:  Trace periankle edema  Skin:   No ecchymosis, erythema, or ulcers  Neuro:   AAOX3, no focal motor deficits      Labs:     Recent Results (from the past 336  hour(s))   CBC auto differential    Collection Time: 03/10/17  5:25 AM   Result Value Ref Range    WBC 3.40 (L) 3.90 - 12.70 K/uL    Hemoglobin 9.8 (L) 12.0 - 16.0 g/dL    Hematocrit 29.7 (L) 37.0 - 48.5 %    Platelets 264 150 - 350 K/uL   CBC auto differential    Collection Time: 03/09/17  7:05 AM   Result Value Ref Range    WBC 3.74 (L) 3.90 - 12.70 K/uL    Hemoglobin 10.0 (L) 12.0 - 16.0 g/dL    Hematocrit 31.4 (L) 37.0 - 48.5 %    Platelets 288 150 - 350 K/uL   CBC auto differential    Collection Time: 03/08/17  1:58 PM   Result Value Ref Range    WBC 5.66 3.90 - 12.70 K/uL    Hemoglobin 11.8 (L) 12.0 - 16.0 g/dL    Hematocrit 36.4 (L) 37.0 - 48.5 %    Platelets 367 (H) 150 - 350 K/uL       No results found for this or any previous visit (from the past 336 hour(s)).      Recent Labs  Lab 03/08/17  2041 03/09/17  0131 03/09/17  0705 03/09/17  1203   TROPONINI 1.186* 0.771* 0.413* 0.289*       Estimated Creatinine Clearance: 63.8 mL/min (based on Cr of 0.7).      Assessment & Plan:     57F CAD s/p MI with 2 stents in place, breast cancer (s/p bilateral mastectomy and reconstruction), metastatic pancreatic cancer (s/p whipple 4/2016 w/ mets to liver and spine, b/l pleural effusion for which there are plans being undergone for bilateral VATS with drainage of effusion +/- PleurX or pleurodesis), DM2 on insulin, and hypothyroidism who is admitted with NSTEMI. Cardiology service is consulted for     1) NSTEMI  - Typical angina + troponin elevation.  - SHAILESH: 3.  - Chest pain free today and troponin trended down.  - Decision was made to treat medically without intervention because stage 4 pancreatic cancer non-responsive to chemotherapy.  - Currently on heparin, aspirin and plavix.  - Continue with heparin for 24 hours more.  - Focal wall motion abnormalities on echo.  - Continue aspirin, plavix, statin, metoprolol 12.5 mg BID. Not a candidate for ACEI for now.  - Would give PPI while on DAPT.     2) Bilateral Pleural  Effusion / Need For VATS  - CT 3 weeks ago showed moderate bilateral pleural effusion.  - But CXR from admission shows small right sided pleural effusion and physical exam is not suggestive of a large pleural effusion.   - Improvement is likely from lasix oral.   - Would post-pone the surgery for now because of the improvement of pleural effusion and recent NSTEMI and need for DAPT.     3) Leg Edema / Exertional Dyspnea  - Etiology unclear. Differential: low albumin, diastolic dysfunction.  - Continue with home dose of lasix for now.    Patient can be discharged home from our point of view this afternoon after completing heparin infusion.    I will discuss with the attending physician. Attending addendum is to follow.    Signed:  Tanner Leos MD  Cardiology Fellow, PGY-VI  Pager: [phone]  3/10/2017 10:13 AM    Attending Addendum:

## 2017-03-10 NOTE — PLAN OF CARE
Problem: Patient Care Overview  Goal: Plan of Care Review  Outcome: Ongoing (interventions implemented as appropriate)  Heparin infusing. Lasix PO. BS checked- coverage given during shift. Nursing communication: to use PIV in R arm and use BP on leg.Plan of care reviewed with patient. Pt has no complaints of any pain or SOB. VSS. Ambulates independently. No acute events overnight. Will continue to monitor

## 2017-03-10 NOTE — NURSING
Pt D/C home per MD orders. Tele removed; monitor room notified. IV access removed and intact X1. VSS, NAD and no complaints at this time. Pt given and explained med list and prescriptions. Pt verbalizes complete understanding of all D/C instructions and follow up care. PT given printed AVS.Pt awaiting family arrival and pt escort. Will continue to monitor.

## 2017-03-10 NOTE — DISCHARGE SUMMARY
Discharge Summary  Hospital Medicine    Attending Provider on Discharge: Chad Larsen     Discharging Team:    [facility] [place] [facility] MED C  [facility] [place]ARDIOLOGY TEAM A - CARDIOLOGY CONSULT STEPDOWN     Date of Admission:  3/8/2017         Date of Discharge:  3/10/2017      Diagnoses:     Principal Problem(s):   NSTEMI (non-ST elevated myocardial infarction)     Secondary Problems:  Active Hospital Problems    Diagnosis    *NSTEMI (non-ST elevated myocardial infarction)    Chest pain        Hospital Course:      57F CAD s/p MI with 2 stents in place, breast cancer (s/p bilateral mastectomy and reconstruction), metastatic pancreatic cancer (s/p whipple 4/2016 w/ mets to liver and spine, b/l pleural effusion for which there are plans being undergone for bilateral VATS with drainage of effusion +/- PleurX or pleurodesis), DM2 on insulin, and hypothyroidism here for evaluation of chest pain. Patient presents today from a meeting with oncologists where she became very upset. She reports that there were discussions of potentially stopping chemotherapy as she wasn't responding as they would have liked. Patient had subsequent development of severe L-sided pressure-like chest pain radiating in jaw and LUE. Endorses nausea. Given 1 tab nitro prior to presentation, stated it did not help the pain. Given 2nd tab of nitro with subsequent improvement in pain. She states the pain probably lasted more than 30 minutes. She also reports about 2 months of GOLD after one block and about one week of bilateral LE edema. She denies orthopnea or PND. Stress echocardiogram and nuclear stress were negative in 2016. The patient was noted to have elevation in troponin and ECG is not changed from prior. The patient is admitted to [facility] for further management of ACS - NSTEMI. Cardiology has been following and recommending of conservative management given overall prognosis. The patient is scheduled for outpatient CT surgery and this should be postponed (message  sent to CT surgeon).  Upon completion of heparin x 48 hour, patient is to be d/c with close cardiology f/u on appropriate medical therapy and anti-anginal medications.  Please see below for further details:    NSTEMI  CAD native vessel native heart with angina  - Typical chest pain in high risk patient  - ECG: sinus bradycardia with PAC  - Maisha peak to 1.2, now downtrending  - ECHO 3/9  CONCLUSIONS     1 - Low normal to mildly depressed left ventricular systolic function (EF 50-55%).     2 - Normal right ventricular systolic function .     3 - Normal left ventricular diastolic function.     4 - Mild tricuspid regurgitation.     5 - Bilateral pleural effusion.   - DSE 4/2016 is negative  - Repeat 2D ECHO: The following segments were hypokinetic: mid anterolateral wall, basal anterolateral wall, mid anterior wall.  - Manage as ACS with ASA/plavix/statin  - C/w IV UFH for 48 hours total  - Can add metoprolol 12.5 BID  - No present indication for ACE-I  - Less invasive strategy likely optimal in this patient, as discussed; cardiology consult agrees with approach      B/l pleural effusions  - Likely metastatic and not due to heart failure as nl CVP  - I have messaged CTS to defer VATS +/- PleurX or pleurodesis as a more conservative approach must be considered in v/o NSTEMI  - Can c/w lasix at home dosage      Pancreatic cancer, Stage IV  T4 compression fx  - s/p whipple 4/2016 w/ mets to liver and spine  - c/w pancreatic enzyme supplementation  - c/w palliative pain medication  - f/u with oncology/rad-onc for further discussion of treatment; of note, patient seen by neurosurgery prior for T4 compression fx and recommendation was made for radiation tx   - inpatient consult to radiation oncology placed 3/9 per rad-onc requests      DM2 with neuropathy on long term insulin  - well controlled, a1c 5.8  - c/w home regimen + SSI      Hypothyroidism  - c/w home synthroid    Significant Diagnostic Studies:   Labs:   Recent Labs       03/10/17   0525   WBC  3.40*   RBC  3.14*   HGB  9.8*   HCT  29.7*   PLT  264   MCV  95   MCH  31.2*   MCHC  33.0   GRAN  60.0  2.0   LYMPH  21.2  0.7*   MONO  14.4  0.5   EOS  0.1      Recent Labs      03/10/17   0525   GLU  146*   NA  141   K  3.7   CL  107   CO2  23   BUN  15   CREATININE  0.7   CALCIUM  8.8   ANIONGAP  11   MG  1.5*   PHOS  5.2*        Microbiology:   Blood Culture, Routine   Date Value Ref Range Status   03/01/2016 No growth after 5 days.  Final   08/02/2011 NO GROWTH AFTER 5 DAYS  - FINAL REPORT.  Final     Urine Culture, Routine   Date Value Ref Range Status   12/07/2016 ESCHERICHIA COLI  >100,000 cfu/ml    Final     Aerobic Bacterial Culture   Date Value Ref Range Status   04/12/2016 KLEBSIELLA PNEUMONIAE  Moderate    Final   04/12/2016 SERRATIA MARCESCENS  Rare    Final   04/12/2016 ACTINOMYCES ODONTOLYTICUS  Rare    Final   04/12/2016 VIRIDANS STREPTOCOCCUS GROUP  Rare    Final     Anaerobic Culture   Date Value Ref Range Status   04/12/2016 FUSOBACTERIUM NUCLEATUM  Few    Final   04/12/2016 PREVOTELLA (B.) BUCCAE  Moderate    Final       Radiology:   Results for orders placed during the hospital encounter of 09/12/16   X-Ray Chest 1 View    Narrative  AP chest radiograph.      Comparison: 4/12/16    Results: Few surgical clips noted overlying the bilateral upper thoracic region consistent with postoperative change.  The lung volume appears adequate.  No parenchymal or pleural abnormality is seen.  The cardiac silhouette  and pulmonary vascularity appear within normal limits.  The mediastinum is midline. .  No pneumothorax.  The osseous structures  appear normal  for age.    Impression  No acute intrathoracic process seen.  ______________________________________     Electronically signed by: KEN CHO MD  Date:     09/12/16  Time:    12:31       Results for orders placed during the hospital encounter of 02/10/17   X-Ray Chest PA And Lateral    Narrative HISTORY:  Pleural  effusion    TECHNIQUE: PA and lateral chest radiograph     COMPARISON: Chest radiograph 11/30/16      FINDINGS:  Followup of pleural effusion and achieved left pleural effusion are unchanged, with adjacent airspace opacification.  Lungs are overall mildly hyperinflated with flattening of the diaphragms.  There is minimal accentuation of interstitial markings throughout the lungs, slightly improved from the prior examination.  Central pulmonary vascular congestion is unchanged.  No pneumothorax.  Cardiac silhouette is normal.  Visualized osseous structures and upper abdomen are  unremarkable.    Impression Small moderate right pleural effusion and trace left pleural effusion, unchanged, with adjacent dependent airspace disease.  Slightly decreased interstitial pulmonary edema.      Electronically signed by: EDDI PETTY  Date:     02/10/17  Time:    11:18       Results for orders placed during the hospital encounter of 02/27/17   CT Abdomen Pelvis With Contrast    Narrative Clinical indication: 57-year-old female with pancreatic cancer.    Comparison: CT chest abdomen and pelvis 11/2016.    Technique: Transaxial images were obtained through the chest abdomen and pelvis in 5 mm increments.  P.o. contrast and 75 cc of Omnipaque 350 IV contrast were administered.    Findings:  Structures of the base of the neck are unremarkable.  Heart is normal in size without pericardial effusion.  Aorta is non-aneurysmal.  No evidence of hilar, axillary, or mediastinal lymphadenopathy.    There is interval development of moderate bilateral pleural effusions with significant volume loss and atelectasis within the lower lobes.  Right-sided effusion appears partially loculated.  Mild centrilobular emphysematous changes seen.  Difficult to exclude underlying lesion in the regions of atelectasis.      Postsurgical changes consistent with previous focal procedure.  Remainder of the pancreas is atrophic in appearance.  There has been interval  enlargement of at least four hepatic hypodense lesions concerning for metastatic disease, the largest of which measures 2.1 cm within posterior right hepatic lobe segment VII.  There is diffuse periportal edema.  No evidence of biliary ductal dilatation.  Gallbladder has been removed.  Spleen is mildly enlarged.  Postsurgical changes are seen in the stomach.  There is stable left adrenal thickening.  Right adrenal gland is unremarkable.    Kidneys are functioning.  No evidence of hydronephrosis.  Ureters are unremarkable along their courses.  Urinary bladder is unremarkable.  Uterus is small or absent.    Aorta tapers normally with moderate atherosclerosis.  The visualized loops of small and large bowel show no evidence of obstruction or inflammation.  There is no ascites, free fluid, or intraperitoneal free air.    There is stable mild compression deformity of the T4 vertebral body.  Abnormal sclerotic focus seen within the T5 vertebral body and there is dense sclerosis of the partially visualized C7 and T1 vertebral bodies concerning for metastatic disease.  Subcutaneous soft tissue structures are unremarkable.    Impression 1.  Multiple hypodense liver lesions concerning for metastatic disease.    2.  Interval development of moderate bilateral pleural effusions, which appears partially loculated on the right.  Mild scattered airspace opacities seen with significant volume loss and compressive atelectasis within the lower lobes.  Difficult to exclude potential underlying lesion.    3.  Postsurgical changes of Whipple procedure.    4.  Stable mild compression deformity involving the T4 vertebral body with new sclerotic foci involving C7, T1, and T5 concerning for osseous metastatic disease.    5.  Additional findings as above.    *Epic notification system activated*      Electronically signed by: LINDA TUCKER MD  Date:     02/27/17  Time:    14:39       Results for orders placed during the hospital encounter of  03/18/13   CT Head Without Contrast    Narrative Ventricular system is normal in size and position with no indication of midline shift or evidence of hydrocephalus.  Parenchymal brain attenuation demonstrates no significant abnormality.  No evidence of recent intracranial hemorrhage.  No findings on   the noncontrast exam to suggest neoplasm.  No subdural collections.  No focal areas of hypoattenuation indicating recent or remote major vascular distribution infarction.  Note is made of an air-fluid level within the sphenoid sinusitis, consistent with   acute sinusitis.  The other paranasal sinuses, as visualized, appear clear.    Impression  Noncontrast cranial CT examination demonstrates no significant intra-or extraaxial intracranial abnormality.  Sphenoid sinusitis is observed.      Electronically signed by: Jaspal Seals MD  Date:     03/19/13  Time:    05:48         Cardiac Studies: 2D ECHO 3/9/2017    Significant Treatments/Procedures:   None    Consults while in Hospital:   Cardiology    Discharge Medications:      Current Discharge Medication List      START taking these medications    Details   atorvastatin (LIPITOR) 40 MG tablet Take 1 tablet (40 mg total) by mouth once daily.  Qty: 30 tablet, Refills: 2      clopidogrel (PLAVIX) 75 mg tablet Take 1 tablet (75 mg total) by mouth once daily.  Qty: 30 tablet, Refills: 11      metoprolol tartrate (LOPRESSOR) 25 MG tablet Take 0.5 tablets (12.5 mg total) by mouth 2 (two) times daily.  Qty: 30 tablet, Refills: 2      nitroGLYCERIN (NITROSTAT) 0.4 MG SL tablet Place 1 tablet (0.4 mg total) under the tongue every 5 (five) minutes as needed for Chest pain.  Qty: 30 tablet, Refills: 0      pantoprazole (PROTONIX) 40 MG tablet Take 1 tablet (40 mg total) by mouth once daily.  Qty: 30 tablet, Refills: 2         CONTINUE these medications which have NOT CHANGED    Details   ascorbic acid, vitamin C, (VITAMIN C) 500 MG tablet Take 500 mg by mouth 2 (two) times daily.     "  aspirin (ECOTRIN) 81 MG EC tablet Take 81 mg by mouth once daily.      BACILLUS COAGULANS (PROBIOTIC, B. COAGULANS, ORAL) Take by mouth.      BD ULTRA-FINE EDWIN PEN NEEDLES 32 x 5/32 " Ndle Uses 5 times daily, on multiple daily insulin injections  Qty: 150 each, Refills: 12    Associated Diagnoses: Type 2 diabetes mellitus with hyperglycemia      furosemide (LASIX) 40 MG tablet Take 1 tablet (40 mg total) by mouth 2 (two) times daily.  Qty: 60 tablet, Refills: 1    Associated Diagnoses: Edema, unspecified type      gabapentin (NEURONTIN) 300 MG capsule Take 1 capsule (300 mg total) by mouth 3 (three) times daily.  Qty: 90 capsule, Refills: 2    Associated Diagnoses: Neuropathic pain      insulin aspart (NOVOLOG FLEXPEN) 100 unit/mL InPn pen Inject 6 Units into the skin 3 (three) times daily with meals. With correction scale for 33 units max TDD  Qty: 1 Box, Refills: 6    Associated Diagnoses: Type 2 diabetes mellitus without complication, with long-term current use of insulin      insulin glargine, TOUJEO, (TOUJEO SOLOSTAR) 300 unit/mL (1.5 mL) InPn pen Inject 4 Units into the skin 2 (two) times daily.  Qty: 1 Syringe, Refills: 12      KRILL OIL ORAL Take by mouth once daily.      levothyroxine (SYNTHROID) 88 MCG tablet Take 1 tablet (88 mcg total) by mouth once daily.  Qty: 90 tablet, Refills: 3    Associated Diagnoses: Hypothyroidism (acquired)      lipase-protease-amylase 36,000-114,000- 180,000 unit CpDR Take 3 capsules by mouth 3 (three) times daily with meals.  Qty: 270 capsule, Refills: 5      loratadine (CLARITIN) 10 mg tablet Take 10 mg by mouth once daily.      methadone (DOLOPHINE) 5 MG tablet Take 1 tablet (5 mg total) by mouth every 12 (twelve) hours.  Qty: 60 tablet, Refills: 0    Associated Diagnoses: Pancreatic adenocarcinoma; Liver metastases; Thoracic compression fracture, closed, initial encounter      metoclopramide HCl (REGLAN) 10 MG tablet Take 1 tablet (10 mg total) by mouth 4 (four) times " daily before meals and nightly.  Qty: 90 tablet, Refills: 1    Associated Diagnoses: History of pancreatic cancer      morphine (MS CONTIN) 15 MG 12 hr tablet Take 3 tablets (45 mg total) by mouth every 8 (eight) hours.  Qty: 270 tablet, Refills: 0    Associated Diagnoses: History of pancreatic cancer      multivitamin capsule Take 1 capsule by mouth once daily.      ondansetron (ZOFRAN-ODT) 8 MG TbDL Take 1 tablet (8 mg total) by mouth every 6 (six) hours as needed.  Qty: 100 tablet, Refills: 1    Associated Diagnoses: Nausea and vomiting, intractability of vomiting not specified, unspecified vomiting type      senna-docusate 8.6-50 mg (PERICOLACE) 8.6-50 mg per tablet Take 1 tablet by mouth daily as needed for Constipation.      vitamin D 1000 units Tab Take 185 mg by mouth once daily.      diazePAM (VALIUM) 5 MG tablet Take 1 tablet (5 mg total) by mouth every 12 (twelve) hours as needed (muscle spasms).  Qty: 30 tablet, Refills: 1    Associated Diagnoses: Insomnia, unspecified type      hydrocodone-acetaminophen 5-325mg (NORCO) 5-325 mg per tablet Take 1 tablet by mouth every 6 (six) hours as needed for Pain.      nicotine (NICODERM CQ) 14 mg/24 hr Place 1 patch onto the skin once daily.  Qty: 30 patch, Refills: 3    Associated Diagnoses: Tobacco abuse              Discharge Diet:cardiac diet and diabetic diet: 2000 calorie with Fluid restriction 1500 cc per day    Activity: activity as tolerated    Discharged Condition: Stable    Discharge Disposition: Home or Self Care    Follow-up:   Future Appointments  Date Time Provider Department Center   3/21/2017 8:00 AM Tanner Leos MD [facility] CARDIO [place]       Studies/Results pending on discharge:   None    Chad Larsen MD  [facility] Medicine

## 2017-03-10 NOTE — PROGRESS NOTES
Progress Note  Hospital Medicine    Provider team:    [facility]  [facility] [place]AM A - CARDIOLOGY CONSULT STEPDOWN  Admit Date: 3/8/2017  Encounter Date: 03/10/2017     SUBJECTIVE:     Follow-up Visit for: NSTEMI (non-ST elevated myocardial infarction)    HPI (See H&P for complete P,F,SHx): 57F CAD s/p MI with 2 stents in place, breast cancer (s/p bilateral mastectomy and reconstruction), metastatic pancreatic cancer (s/p whipple 4/2016 w/ mets to liver and spine, b/l pleural effusion for which there are plans being undergone for bilateral VATS with drainage of effusion +/- PleurX or pleurodesis), DM2 on insulin, and hypothyroidism here for evaluation of chest pain. Patient presents today from a meeting with oncologists where she became very upset. She reports that there were discussions of potentially stopping chemotherapy as she wasn't responding as they would have liked. Patient had subsequent development of severe L-sided pressure-like chest pain radiating in jaw and LUE. Endorses nausea. Given 1 tab nitro prior to presentation, stated it did not help the pain. Given 2nd tab of nitro with subsequent improvement in pain. She states the pain probably lasted more than 30 minutes. She also reports about 2 months of GOLD after one block and about one week of bilateral LE edema. She denies orthopnea or PND. Stress echocardiogram and nuclear stress were negative in 2016.  The patient was noted to have elevation in troponin and ECG is not changed from prior. The patient is admitted to [facility] for further management of ACS - NSTEMI.  Cardiology has been following and recommending of conservative management given overall prognosis.  The patient is scheduled for outpatient CT surgery and this should be postponed (message sent to CT surgeon).    Interval history: Patient reports feeling well without further chest pain.  She does not have shortness of breath.  All questions answered to patient satisfaction.  She is  "anxious to go home today.    Review of Systems:  Review of Systems   Constitutional: Negative for chills and fever.   HENT: Negative for congestion and sore throat.    Eyes: Negative for photophobia, pain and discharge.   Respiratory: Negative for cough, hemoptysis, sputum production and shortness of breath.    Cardiovascular: Negative for chest pain, palpitations and leg swelling.   Gastrointestinal: Negative for abdominal pain, diarrhea, nausea and vomiting.   Genitourinary: Negative for dysuria and urgency.   Musculoskeletal: Negative for myalgias and neck pain.   Skin: Negative for itching and rash.   Neurological: Negative for sensory change, focal weakness and headaches.   Endo/Heme/Allergies: Negative for polydipsia. Does not bruise/bleed easily.   Psychiatric/Behavioral: Negative for depression and suicidal ideas.       OBJECTIVE:       Intake/Output Summary (Last 24 hours) at 03/10/17 0729  Last data filed at 03/10/17 0527   Gross per 24 hour   Intake          1749.99 ml   Output             2225 ml   Net          -475.01 ml     Vital Signs Range (Last 24H):  Temp:  [97.6 °F (36.4 °C)-98.6 °F (37 °C)]   Pulse:  [64-99]   Resp:  [16-20]   BP: ()/(51-62)   SpO2:  [95 %-98 %]   Body mass index is 20.3 kg/(m^2).    Objective:  General Appearance:  Comfortable, well-appearing, in no acute distress and not in pain.    Vital signs: (most recent): Blood pressure (!) 107/57, pulse 64, temperature 98.1 °F (36.7 °C), temperature source Oral, resp. rate 20, height 4' 11" (1.499 m), weight 45.6 kg (100 lb 8.5 oz), SpO2 95 %, not currently breastfeeding.  No fever.    Output: Producing urine and producing stool.    HEENT: Normal HEENT exam.    Lungs:  Normal effort.  She is not in respiratory distress.  Breath sounds clear to auscultation.  No wheezes or rales.    Heart: Normal rate.  Regular rhythm.  S1 normal and S2 normal.  No murmur.   Chest: Symmetric chest wall expansion.   Extremities: Normal range of " motion.  There is no deformity, effusion, local swelling or dependent edema.    Neurological: Patient is alert and oriented to person, place and time.  Normal strength.    Skin:  Warm and dry.    Abdomen: Abdomen is soft.  Bowel sounds are normal.   There is no abdominal tenderness.     Pupils:  Pupils are equal, round, and reactive to light.    Pulses: Distal pulses are intact.      Medications:  Medication list was reviewed in EPIC and changes noted under Assessment/Plan and MAR.    Laboratory:  Recent Labs      03/10/17   0525   WBC  3.40*   RBC  3.14*   HGB  9.8*   HCT  29.7*   PLT  264   MCV  95   MCH  31.2*   MCHC  33.0   GRAN  60.0  2.0   LYMPH  21.2  0.7*   MONO  14.4  0.5   EOS  0.1      Recent Labs      03/10/17   0525   GLU  146*   NA  141   K  3.7   CL  107   CO2  23   BUN  15   CREATININE  0.7   CALCIUM  8.8   ANIONGAP  11   MG  1.5*   PHOS  5.2*       ASSESSMENT/PLAN:     Active Hospital Problems    Diagnosis  POA    *NSTEMI (non-ST elevated myocardial infarction) [I21.4]  Yes    Chest pain [R07.9]  Yes      Resolved Hospital Problems    Diagnosis Date Resolved POA   No resolved problems to display.      NSTEMI  CAD native vessel native heart with angina  - Typical chest pain in high risk patient  - ECG: sinus bradycardia with PAC  - Maisha peak to 1.2, now downtrending  - ECHO 3/9  CONCLUSIONS     1 - Low normal to mildly depressed left ventricular systolic function (EF 50-55%).     2 - Normal right ventricular systolic function .     3 - Normal left ventricular diastolic function.     4 - Mild tricuspid regurgitation.     5 - Bilateral pleural effusion.   - DSE 4/2016 is negative  - Repeat 2D ECHO: The following segments were hypokinetic: mid anterolateral wall, basal anterolateral wall, mid anterior wall.  - Manage as ACS with ASA/plavix/statin  - C/w IV UFH for 48 hours total  - Can add metoprolol 12.5 BID  - No present indication for ACE-I  - Less invasive strategy likely optimal in this  patient, as discussed; cardiology consult agrees with approach     B/l pleural effusions  - Likely metastatic and not due to heart failure as nl CVP  - I have messaged CTS to defer VATS +/- PleurX or pleurodesis as a more conservative approach must be considered in v/o NSTEMI  - Can c/w lasix at home dosage     Pancreatic cancer, Stage IV  T4 compression fx  - s/p whipple 4/2016 w/ mets to liver and spine  - c/w pancreatic enzyme supplementation  - c/w palliative pain medication  - f/u with oncology/rad-onc for further discussion of treatment; of note, patient seen by neurosurgery prior for T4 compression fx and recommendation was made for radiation tx   - inpatient consult to radiation oncology placed 3/9 per rad-onc requests     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 35 minutes spent on discharge planning and counseling alone.  Chad Larsen MD  [facility] Medicine

## 2017-03-13 ENCOUNTER — PATIENT OUTREACH (OUTPATIENT)
Dept: ADMINISTRATIVE | Facility: CLINIC | Age: 57
End: 2017-03-13
Payer: COMMERCIAL

## 2017-03-13 ENCOUNTER — TELEPHONE (OUTPATIENT)
Dept: INTERNAL MEDICINE | Facility: CLINIC | Age: 57
End: 2017-03-13

## 2017-03-13 NOTE — TELEPHONE ENCOUNTER
----- Message from Maicol Prajapati sent at 3/13/2017 11:32 AM CDT -----  Contact: Savanna [facility] [phone]  Savanna called and stated that the above pt was just admitted to [facility] today, but is refusing PT & OT she has been taking Lasix 100 Mg instead of 80 Mg, please advice     Thanks

## 2017-03-13 NOTE — PATIENT INSTRUCTIONS
Discharge Instructions for Heart Attack  You have had a heart attack (acute myocardial infarction). A heart attack occurs when a vessel that sends blood to your heart suddenly becomes blocked. This causes your heart not to work as well as it should. Follow these guidelines for home care and lifestyle changes.  Home care  · Take your medicines exactly as directed. Dont skip doses. Talk with your healthcare provider if your medicines aren't working for you. Together you can come up with another treatment plan.  · Remember that recovery after a heart attack takes time. Plan to rest for at least 4 to 8 weeks while you recover. Then return to normal activity when your doctor says its OK.  · Ask your doctor about joining a heart rehabilitation program. This can help strengthen your heart and lungs and give you more energy and confidence.  · Tell your doctor if you are feeling depressed. Feelings of sadness are common after a heart attack. But it is important to speak to someone or seek counseling if you are feeling overwhelmed by these feelings.  · Call 911 right away if you have chest pain or pain that goes to your shoulder, neck, or back. Don't drive yourself to the hospital.  · Ask your family members to learn CPR. This is an important skill that can save lives when it's needed.  · Learn to take your own blood pressure and pulse. Keep a record of your results. Ask your doctor when you should seek emergency medical attention. He or she will tell you which blood pressure reading is dangerous.  Lifestyle changes  Your heart attack might have been caused by cardiovascular disease. Your healthcare provider will work with you to make changes to your lifestyle. This will help the heart disease from getting worse. These changes will most likely be a combination of diet and exercise.  Diet  Your healthcare provider will tell you what changes you need to make to your diet. You may need to see a registered dietitian for help  with these diet changes. These changes may include:  · Cutting back on how much fat and cholesterol you eat  · Cutting back on how much salt (sodium) you eat, especially if you have high blood pressure  · Eating more fresh vegetables and fruits  · Eating lean proteins such as fish, poultry, beans, and peas, and eating less red meat and processed meats  · Using low-fat dairy products  · Using vegetable and nut oils in limited amounts  · Limiting how many sweets and processed foods such as chips, cookies, and baked goods you eat  · Limiting how often you eat out. And when you do eat out, making better food choices.  · Not eating fried or greasy foods, or foods high in saturated fat  Exercise  Your healthcare provider may tell you to get more exercise if you haven't been physically active. Depending on your case, your provider may recommend that you get moderate to vigorous physical activity for at least 40 minutes each day, and for at least 3 to 4 days each week. A few examples of moderate to vigorous activity include:  · Walking at a brisk pace, about 3 to 4 miles per hour  · Jogging or running  · Swimming or water aerobics  · Hiking  · Dancing  · Martial arts  · Tennis  · Riding a bicycle or stationary bike  Other changes  Your healthcare provider may also recommend that you:  · Lose weight. If you are overweight or obese, your provider will work with you to lose extra pounds. Making diet changes and getting more exercise can help. A good goal is to lose your 10% of your body weight in one year.  · Stop smoking. Sign up for a stop-smoking program to make it more likely for you to quit for good. You can join a stop-smoking support group. Or ask your doctor about nicotine replacement products.  · Learn to manage stress. Stress management techniques to help you deal with stress in your home and work life. This will help you feel better emotionally and ease the strain on your heart.  Follow-up  Make a follow-up  appointment as directed by our staff.     When to seek medical advice  Call 911 right away if you have:  · Chest pain that goes to your neck, jaw, back, or shoulder  · Shortness of breath  Otherwise, call your doctor immediately if you have:  · Lightheadedness, dizziness, or fainting  · Feeling of irregular heartbeat or fast pulse.   Date Last Reviewed: 10/1/2016  © 7101-8570 [employer]. [address] [place] [address], [place] [address]. All rights reserved. This information is not intended as a substitute for professional medical care. Always follow your healthcare professional's instructions.

## 2017-03-13 NOTE — TELEPHONE ENCOUNTER
I spoke to her- she had been on 2 different doses of lasix    She will take 40 mg 2 x daily, reviewed    Nurse told patient she did not think the patient needs PT/OT.  She did not refuse    thanks

## 2017-03-14 NOTE — TELEPHONE ENCOUNTER
I spoke to her yesterday- she had been on 2 different doses of lasix    She will take 40 mg 2 x daily, reviewed.  I reiterated this to her yesterday when I called     Lashay told me that the nurse who went out told her that she (the nurse) did not think the patient needed PT/OT!  Lashay told me she did not refuse

## 2017-03-14 NOTE — TELEPHONE ENCOUNTER
----- Message from Elsa Valencia sent at 3/14/2017  1:45 PM CDT -----  Contact: Savanna with [facility]  Type: Home Health (orders, updates, clarifications, etc.)    Home Health Agency/Nurse: Savanna with [facility]    Phone number: [phone]    Reason for call: The patient's admit was done yesterday and she refused PT and OT.  She was taking two of the same medications, Lasix.  Instead of taking Lasix 80 mg, she was taking a combined dose of Lasix 100 mg. Savanna would like to make sure the patient's kidneys are okay.  Please call her back.     Thanks!

## 2017-03-14 NOTE — TELEPHONE ENCOUNTER
Spoke with Savanna HERNANDEZ and she states that the patient is refusing all PT and OT    Per Savanna the patient has been taking 40 mg lasix in the am. 40 mg of lasix in the pm. On top of the 40 mg in the am she is taking an additional 20mg in the am and pm so a total of 120 mg of lasix a day    The patient states that her urine is normal and yellow and that she is not urinating a lot to her through out the day( she doesn't think its frequent)    Please advise

## 2017-03-14 NOTE — TELEPHONE ENCOUNTER
[place]     Spoke with Savanna from [facility] she states that the visit was yesterday and she was following up. I informed her that Dr Kelsey had already spoke with the patient in regard to her lasix.    Savanna states that the patient told her that she wants to save her home health visits in case she get put on hospice. Savanna states that she didn't tell the patient that she does not need PT and OT she also stated that she asked the patient 3 times if she refuses Savanna stated the patient replied yes.

## 2017-03-15 ENCOUNTER — PATIENT MESSAGE (OUTPATIENT)
Dept: DIABETES | Facility: CLINIC | Age: 57
End: 2017-03-15

## 2017-03-15 DIAGNOSIS — E11.9 TYPE 2 DIABETES MELLITUS WITHOUT COMPLICATION: ICD-10-CM

## 2017-03-15 DIAGNOSIS — E03.9 ACQUIRED HYPOTHYROIDISM: Primary | Chronic | ICD-10-CM

## 2017-03-15 RX ORDER — LEVOTHYROXINE SODIUM 100 UG/1
100 TABLET ORAL DAILY
Qty: 30 TABLET | Refills: 11 | Status: SHIPPED | OUTPATIENT
Start: 2017-03-15 | End: 2017-05-12 | Stop reason: SDUPTHER

## 2017-03-16 ENCOUNTER — OFFICE VISIT (OUTPATIENT)
Dept: HEMATOLOGY/ONCOLOGY | Facility: CLINIC | Age: 57
End: 2017-03-16
Payer: COMMERCIAL

## 2017-03-16 ENCOUNTER — HOSPITAL ENCOUNTER (OUTPATIENT)
Dept: RADIOLOGY | Facility: HOSPITAL | Age: 57
Discharge: HOME | End: 2017-03-16
Attending: INTERNAL MEDICINE
Payer: COMMERCIAL

## 2017-03-16 ENCOUNTER — LAB VISIT (OUTPATIENT)
Dept: LAB | Facility: HOSPITAL | Age: 57
End: 2017-03-16
Attending: INTERNAL MEDICINE
Payer: COMMERCIAL

## 2017-03-16 ENCOUNTER — TELEPHONE (OUTPATIENT)
Dept: CARDIOLOGY | Facility: CLINIC | Age: 57
End: 2017-03-16

## 2017-03-16 ENCOUNTER — TELEPHONE (OUTPATIENT)
Dept: HEMATOLOGY/ONCOLOGY | Facility: CLINIC | Age: 57
End: 2017-03-16

## 2017-03-16 VITALS
HEIGHT: 59 IN | TEMPERATURE: 98 F | BODY MASS INDEX: 20.66 KG/M2 | DIASTOLIC BLOOD PRESSURE: 56 MMHG | SYSTOLIC BLOOD PRESSURE: 98 MMHG | WEIGHT: 102.5 LBS | HEART RATE: 64 BPM

## 2017-03-16 DIAGNOSIS — M79.2 NEUROPATHIC PAIN: ICD-10-CM

## 2017-03-16 DIAGNOSIS — R11.2 NAUSEA AND VOMITING, INTRACTABILITY OF VOMITING NOT SPECIFIED, UNSPECIFIED VOMITING TYPE: ICD-10-CM

## 2017-03-16 DIAGNOSIS — M79.604 RIGHT LEG PAIN: ICD-10-CM

## 2017-03-16 DIAGNOSIS — C25.9 PANCREATIC ADENOCARCINOMA: Chronic | ICD-10-CM

## 2017-03-16 DIAGNOSIS — Z85.07 HISTORY OF PANCREATIC CANCER: Primary | ICD-10-CM

## 2017-03-16 DIAGNOSIS — C25.9 MALIGNANT NEOPLASM OF PANCREAS, UNSPECIFIED LOCATION OF MALIGNANCY: ICD-10-CM

## 2017-03-16 DIAGNOSIS — C25.9 MALIGNANT NEOPLASM OF PANCREAS, UNSPECIFIED LOCATION OF MALIGNANCY: Primary | ICD-10-CM

## 2017-03-16 LAB
ALBUMIN SERPL BCP-MCNC: 3.5 G/DL
ALP SERPL-CCNC: 88 U/L
ALT SERPL W/O P-5'-P-CCNC: 21 U/L
ANION GAP SERPL CALC-SCNC: 7 MMOL/L
AST SERPL-CCNC: 26 U/L
BILIRUB SERPL-MCNC: 0.5 MG/DL
BUN SERPL-MCNC: 15 MG/DL
CALCIUM SERPL-MCNC: 9.5 MG/DL
CHLORIDE SERPL-SCNC: 106 MMOL/L
CO2 SERPL-SCNC: 27 MMOL/L
CREAT SERPL-MCNC: 0.8 MG/DL
ERYTHROCYTE [DISTWIDTH] IN BLOOD BY AUTOMATED COUNT: 16.3 %
EST. GFR  (AFRICAN AMERICAN): >60 ML/MIN/1.73 M^2
EST. GFR  (NON AFRICAN AMERICAN): >60 ML/MIN/1.73 M^2
GLUCOSE SERPL-MCNC: 85 MG/DL
HCT VFR BLD AUTO: 33.1 %
HGB BLD-MCNC: 11 G/DL
MAGNESIUM SERPL-MCNC: 1.9 MG/DL
MCH RBC QN AUTO: 31.1 PG
MCHC RBC AUTO-ENTMCNC: 33.2 %
MCV RBC AUTO: 94 FL
NEUTROPHILS # BLD AUTO: 4.1 K/UL
PLATELET # BLD AUTO: 252 K/UL
PMV BLD AUTO: 9.8 FL
POTASSIUM SERPL-SCNC: 4.1 MMOL/L
PROT SERPL-MCNC: 6.6 G/DL
RBC # BLD AUTO: 3.54 M/UL
SODIUM SERPL-SCNC: 140 MMOL/L
WBC # BLD AUTO: 6.15 K/UL

## 2017-03-16 PROCEDURE — 73552 X-RAY EXAM OF FEMUR 2/>: CPT | Mod: TC,RT

## 2017-03-16 PROCEDURE — 36415 COLL VENOUS BLD VENIPUNCTURE: CPT

## 2017-03-16 PROCEDURE — 73552 X-RAY EXAM OF FEMUR 2/>: CPT | Mod: 26,RT,, | Performed by: RADIOLOGY

## 2017-03-16 PROCEDURE — 80053 COMPREHEN METABOLIC PANEL: CPT

## 2017-03-16 PROCEDURE — 85027 COMPLETE CBC AUTOMATED: CPT

## 2017-03-16 PROCEDURE — 99999 PR PBB SHADOW E&M-EST. PATIENT-LVL IV: CPT | Mod: PBBFAC,,, | Performed by: PHYSICIAN ASSISTANT

## 2017-03-16 PROCEDURE — 83735 ASSAY OF MAGNESIUM: CPT

## 2017-03-16 PROCEDURE — 99213 OFFICE O/P EST LOW 20 MIN: CPT | Mod: S$GLB,,, | Performed by: PHYSICIAN ASSISTANT

## 2017-03-16 RX ORDER — GABAPENTIN 300 MG/1
300 CAPSULE ORAL 3 TIMES DAILY
Qty: 90 CAPSULE | Refills: 2 | Status: SHIPPED | OUTPATIENT
Start: 2017-03-16 | End: 2018-03-16

## 2017-03-16 RX ORDER — ONDANSETRON 8 MG/1
8 TABLET, ORALLY DISINTEGRATING ORAL EVERY 6 HOURS PRN
Qty: 100 TABLET | Refills: 1 | Status: SHIPPED | OUTPATIENT
Start: 2017-03-16 | End: 2018-03-16

## 2017-03-16 RX ORDER — METOCLOPRAMIDE 10 MG/1
10 TABLET ORAL
Qty: 90 TABLET | Refills: 1 | Status: SHIPPED | OUTPATIENT
Start: 2017-03-16 | End: 2018-03-16

## 2017-03-16 NOTE — TELEPHONE ENCOUNTER
Pt here in Clinic - says came from Hematology Clinic visit today.  Says has had nausea, dizziness, lightheadedness and vomiting for several days.  Say BP is low 98/56.  Informed BP is low, but is normal BP, may be lower than usual for her.  Advised pt should go to the ED - pt refused to go to the ED.  Has an appt on 3-21-17 with Dr. Leos - says will keep appt on Tuesday.

## 2017-03-16 NOTE — TELEPHONE ENCOUNTER
"Returned call to pt.   Pt stated she would like bloodwork ordered as she had dizziness last night and then dizziness today and feeling "unwell."   Pt stated she had been taking 2 rx for Lasix on accident.   Per Dr. Nguyễn---labs ordered at 12 and appt at 1 with Michaela Hoffmann.   Pt verbalized understanding.       ----- Message from Christine Mathew sent at 3/16/2017  9:05 AM CDT -----  Contact: PT  Would like labs to be scheduled.       Call: [phone]     "

## 2017-03-16 NOTE — Clinical Note
2 weeks with Dr. Nguyễn with [facility] to discuss possible treatment options given improvement in performance status

## 2017-03-17 NOTE — PROGRESS NOTES
"Dr. Nguyễn's patient    57 year old female with metastatic pancreatic cancer, currently off of therapy but most recently had received Gregg/Abraxane.    Patient found to have bilateral pleural effusions on recent imaging done for restaging.  She was admitted through the ED shortly thereafter with chest pain and found to have a NSTEMI, treated with conservative management, she was recently started on nitroglcyerin plavix, metoprolol and was told to continue lasix 40 mg daily.  She has been recuperating at home.     Patient's scheduled VATS pleurodesis was cancelled due to cardiology recommendation given recent history of MI.    Patient was sick call today complaining of intermittent dizziness.    Patient states she realized this morning that she has been taking double the dose of lasix (ie a new RX was given to her and she continued with the old as well) to a total of 80 mg daily.  As of this morning she switched back to 40 mg.     She started Metoprolol last week and notes associated dizziness started at that time. Otherwise she has been recuperating at home and feeling much better than when on chemotherapy. Her lower extremity edema has improved significantly as has her breathing. She is able to ambulate on her own. No nausea or vomiting, appetite is fair.   She notes painful lump at right thigh that has been present for months "but I forgot to telll my doctors"  Patient with continued nausea for which she takes reglan and zofran.     MRI brain from early March was negative.     Physical Exam:  ECOG 1 (much improved)  NAD and able to ambulate on her own (previously in wheelchair)  VSS although patient's BP is running low for her baseline  Cardio: RRR  Lungs- clear to auscultation bilaterally with some very minimal reduced breath sounds at lung bases. No labored breathing  Abdomen: soft, non-tender, non-distended  Musculoskeletal: 3 cm bony prominence at lateral aspect of right femur, tender to palpation. 1+ pedal " edema bilaterally.    A/P:  53 year old female with metastatic pancreatic cancer with recent NSTEMI. Patient clinically doing much better from performance status standpoint off of chemotherapy and with better control of peripheral edema.  Patient with new onset dizziness.    Patient's labs are WNL and no electrolyte abnormalities even with increased dose of lasix.  Patient is not dehydrated on clinical exam or by labs.  Patient now back to taking 40 mg daily.  I have asked her to stop by Cardiology today to see if they can see her regarding Metoprolol dose as this could be causing dizziness and borderline hypotension. Given recent cardiac events I do not want to switch any of those medications without their consent.  She also has follow up there on 3/20.  Plain films of left femur today to evaluate painful nodularity.  Return to clinic in 2 weeks to see Dr. Nguyễn.

## 2017-03-21 ENCOUNTER — TELEPHONE (OUTPATIENT)
Dept: DIABETES | Facility: CLINIC | Age: 57
End: 2017-03-21

## 2017-03-21 ENCOUNTER — OFFICE VISIT (OUTPATIENT)
Dept: CARDIOLOGY | Facility: CLINIC | Age: 57
End: 2017-03-21
Payer: COMMERCIAL

## 2017-03-21 VITALS
HEART RATE: 63 BPM | HEIGHT: 59 IN | DIASTOLIC BLOOD PRESSURE: 61 MMHG | BODY MASS INDEX: 21.33 KG/M2 | WEIGHT: 105.81 LBS | SYSTOLIC BLOOD PRESSURE: 110 MMHG

## 2017-03-21 DIAGNOSIS — C79.51 BONE METASTASES: ICD-10-CM

## 2017-03-21 DIAGNOSIS — I25.10 CORONARY ARTERY DISEASE INVOLVING NATIVE CORONARY ARTERY OF NATIVE HEART WITHOUT ANGINA PECTORIS: Primary | Chronic | ICD-10-CM

## 2017-03-21 DIAGNOSIS — E03.9 ACQUIRED HYPOTHYROIDISM: Primary | Chronic | ICD-10-CM

## 2017-03-21 DIAGNOSIS — E78.5 HYPERLIPIDEMIA, UNSPECIFIED HYPERLIPIDEMIA TYPE: Chronic | ICD-10-CM

## 2017-03-21 DIAGNOSIS — Z79.4 TYPE 2 DIABETES MELLITUS WITHOUT COMPLICATION, WITH LONG-TERM CURRENT USE OF INSULIN: Chronic | ICD-10-CM

## 2017-03-21 DIAGNOSIS — E11.9 TYPE 2 DIABETES MELLITUS WITHOUT COMPLICATION, WITH LONG-TERM CURRENT USE OF INSULIN: Chronic | ICD-10-CM

## 2017-03-21 PROCEDURE — 99214 OFFICE O/P EST MOD 30 MIN: CPT | Mod: S$GLB,,, | Performed by: INTERNAL MEDICINE

## 2017-03-21 PROCEDURE — 99999 PR PBB SHADOW E&M-EST. PATIENT-LVL V: CPT | Mod: PBBFAC,,, | Performed by: INTERNAL MEDICINE

## 2017-03-21 NOTE — PROGRESS NOTES
I have personally taken the history and examined this patient and agree with the Fellow's note as stated above.

## 2017-03-21 NOTE — PROGRESS NOTES
[facility]  Cardiology Clinic Progress Note      HPI:     Lashay Whitley is a pleasant 57 y.o. female patient with a history of stage 4 metastatic pancreatic cancer (s/p whipple 4/2016) w/ mets to liver and spine,CAD s/p MI with 2 stents in place, breast cancer (s/p bilateral mastectomy and reconstruction),  b/l pleural effusion. The patient presents to our clinic for post hospital discharge follow up.    The patient was admitted to the hospital medicine team with the diagnosis of NSTEMI 10 days ago. Patient was treated for NSTEMI with ASA, plavix, statin, BB, heparin infusion. It was decided to manage the patient conservatively without intervention because of history of metastatic pancreatic cancer unresponsive to chemo and radiation therapy.     The patient had been scheduled for VATS for pleural effusion. However, this was postponed because of NSTEMI. Some improvement in BL effusion was also noted on the CXR.      The patient reports feeling well today. She reports having some nausea and dizziness after she was discharged. She was taking her lasix double dose by mistake and her blood pressure was running low as well. She decreased her lasix dose to 40 mg daily and her symptoms improved.     She denies any chest pain, SOB, PND, orthopnea. Mild LE edema. She takes her metoprolol once a day. She wants to travel in and out of [place].       Review of Systems:     Review of Systems   Constitution: Positive for weight gain. Negative for weakness and malaise/fatigue.   Cardiovascular: Positive for leg swelling. Negative for chest pain, claudication, dyspnea on exertion, near-syncope, orthopnea, palpitations, paroxysmal nocturnal dyspnea and syncope.   Respiratory: Negative for shortness of breath.    Musculoskeletal: Negative for falls.   Gastrointestinal: Negative for abdominal pain.   Neurological: Negative for focal weakness and light-headedness.   Psychiatric/Behavioral: Negative for altered mental status and  memory loss.       PMH:     Past Medical History:   Diagnosis Date    Breast cancer     bilateral    Broken rib     right    CAD (coronary artery disease)     Cataract     Centrilobular emphysema: per CT 2016 9/29/2016    Colon polyps     Coronary atherosclerosis of native coronary artery 4/24/2015    2 stents    Diabetes mellitus type 2 in nonobese     Diverticulosis of large intestine without hemorrhage 10/23/2015    Encounter for blood transfusion     Gastroesophageal reflux disease with esophagitis 10/23/2015    GI bleed     Hyperlipidemia     Hypertension     Myocardial infarction 2011    Myocardial infarction     Pancreas cancer     Pancreatitis     PONV (postoperative nausea and vomiting)     Type 2 diabetes mellitus with hyperglycemia 11/3/2015    Unspecified essential hypertension 4/24/2015    Vertebral fracture, pathological     l3, right side       Past Surgical History:   Procedure Laterality Date    APPENDECTOMY      BREAST RECONSTRUCTION      post mast    CARDIAC CATHETERIZATION  10/29/11    CATARACT EXTRACTION W/  INTRAOCULAR LENS IMPLANT Left 04/09/2015    Dr. Warner    CATARACT EXTRACTION W/  INTRAOCULAR LENS IMPLANT Right 05/14/15    Dr Warner    COLONOSCOPY N/A 10/19/2016    Procedure: COLONOSCOPY;  Surgeon: Alexis Tubbs MD;  Location: [address]);  Service: Endoscopy;  Laterality: N/A;    CORONARY ANGIOPLASTY WITH STENT PLACEMENT  09/08/2011    x2    HYSTERECTOMY      MASTECTOMY Bilateral     bilateral    RK/AK Bilateral     TONSILLECTOMY      WHIPPLE PROCEDURE W/ LAPAROSCOPY  4/2016        Allergies:     Review of patient's allergies indicates:   Allergen Reactions    Penicillins Swelling    Demerol [meperidine] Swelling    Dilaudid [hydromorphone (bulk)] Other (See Comments)     redness    Percocet [oxycodone-acetaminophen] Swelling    Adhesive tape-silicones Rash        Medications:     Current Outpatient Prescriptions on File Prior to Visit  "  Medication Sig Dispense Refill    ascorbic acid, vitamin C, (VITAMIN C) 500 MG tablet Take 500 mg by mouth 2 (two) times daily.      aspirin (ECOTRIN) 81 MG EC tablet Take 81 mg by mouth once daily.      atorvastatin (LIPITOR) 40 MG tablet Take 1 tablet (40 mg total) by mouth once daily. 30 tablet 2    BACILLUS COAGULANS (PROBIOTIC, B. COAGULANS, ORAL) Take by mouth.      BD ULTRA-FINE EDWIN PEN NEEDLES 32 x 5/32 " Ndle Uses 5 times daily, on multiple daily insulin injections 150 each 12    clopidogrel (PLAVIX) 75 mg tablet Take 1 tablet (75 mg total) by mouth once daily. 30 tablet 11    diazePAM (VALIUM) 5 MG tablet Take 1 tablet (5 mg total) by mouth every 12 (twelve) hours as needed (muscle spasms). 30 tablet 1    furosemide (LASIX) 40 MG tablet Take 1 tablet (40 mg total) by mouth 2 (two) times daily. 60 tablet 1    gabapentin (NEURONTIN) 300 MG capsule Take 1 capsule (300 mg total) by mouth 3 (three) times daily. 90 capsule 2    hydrocodone-acetaminophen 5-325mg (NORCO) 5-325 mg per tablet Take 1 tablet by mouth every 6 (six) hours as needed for Pain.      insulin aspart (NOVOLOG FLEXPEN) 100 unit/mL InPn pen Inject 6 Units into the skin 3 (three) times daily with meals. With correction scale for 33 units max TDD (Patient taking differently: Inject 5 Units into the skin 3 (three) times daily with meals. With correction scale for 33 units max TDD) 1 Box 6    insulin glargine, TOUJEO, (TOUJEO SOLOSTAR) 300 unit/mL (1.5 mL) InPn pen Inject 4 Units into the skin 2 (two) times daily. 1 Syringe 12    KRILL OIL ORAL Take by mouth once daily.      levothyroxine (SYNTHROID) 100 MCG tablet Take 1 tablet (100 mcg total) by mouth once daily. 30 tablet 11    lipase-protease-amylase 36,000-114,000- 180,000 unit CpDR Take 3 capsules by mouth 3 (three) times daily with meals. 270 capsule 5    loratadine (CLARITIN) 10 mg tablet Take 10 mg by mouth once daily.      methadone (DOLOPHINE) 5 MG tablet Take 1 " tablet (5 mg total) by mouth every 12 (twelve) hours. 60 tablet 0    metoclopramide HCl (REGLAN) 10 MG tablet Take 1 tablet (10 mg total) by mouth 4 (four) times daily before meals and nightly. 90 tablet 1    metoprolol tartrate (LOPRESSOR) 25 MG tablet Take 0.5 tablets (12.5 mg total) by mouth 2 (two) times daily. 30 tablet 2    morphine (MS CONTIN) 15 MG 12 hr tablet Take 3 tablets (45 mg total) by mouth every 8 (eight) hours. (Patient taking differently: Take 30 mg by mouth every 8 (eight) hours. ) 270 tablet 0    multivitamin capsule Take 1 capsule by mouth once daily.      nitroGLYCERIN (NITROSTAT) 0.4 MG SL tablet Place 1 tablet (0.4 mg total) under the tongue every 5 (five) minutes as needed for Chest pain. 30 tablet 0    ondansetron (ZOFRAN-ODT) 8 MG TbDL Take 1 tablet (8 mg total) by mouth every 6 (six) hours as needed. 100 tablet 1    pantoprazole (PROTONIX) 40 MG tablet Take 1 tablet (40 mg total) by mouth once daily. 30 tablet 2    senna-docusate 8.6-50 mg (PERICOLACE) 8.6-50 mg per tablet Take 1 tablet by mouth daily as needed for Constipation.      vitamin D 1000 units Tab Take 185 mg by mouth once daily.       No current facility-administered medications on file prior to visit.         Social History:     Social History   Substance Use Topics    Smoking status: Former Smoker     Packs/day: 1.50     Years: 40.00     Types: Cigarettes     Quit date: 10/1/2015    Smokeless tobacco: Never Used      Comment: currently vapes    Alcohol use No        Family History:     Family History   Problem Relation Age of Onset    Colon cancer Father 60    Cancer Father     Crohn's disease Sister     Diabetes Sister     Cancer Sister      breast    Hypertension Mother     Macular degeneration Mother     Cancer Mother      breast    Diabetes Mother     Cancer Sister      breast    Diabetes Sister     Heart disease Brother     Diabetes Maternal Aunt     Diabetes Maternal Grandmother      Ulcerative colitis Neg Hx         Physical Exam:     Vitals:  There were no vitals filed for this visit.      Physical Exam   Constitutional: She is oriented to person, place, and time. No distress.   HENT:   Head: Normocephalic and atraumatic.   Eyes: No scleral icterus.   Neck: No JVD present.   Cardiovascular: Normal rate and regular rhythm.  Exam reveals no friction rub.    No murmur heard.  Pulmonary/Chest: She has no wheezes. She has no rales.   Bibasilar dullness  (small) on percussion   Musculoskeletal: She exhibits edema.   Trace periankle edema   Neurological: She is alert and oriented to person, place, and time.   Skin: Skin is warm and dry. She is not diaphoretic.   Psychiatric: She has a normal mood and affect. Her behavior is normal.         Labs:     No results found for this or any previous visit (from the past 336 hour(s)).    Lab Results   Component Value Date    WBC 6.15 03/16/2017    HGB 11.0 (L) 03/16/2017    HCT 33.1 (L) 03/16/2017    MCV 94 03/16/2017     03/16/2017       Assessment & Plan:     57 y.o. female patient with a history of stage 4 metastatic pancreatic cancer (s/p whipple 4/2016) w/ mets to liver and spine,CAD s/p MI with 2 stents in place, breast cancer (s/p bilateral mastectomy and reconstruction),  b/l pleural effusion. The patient was seen in the clinic with the following diagnosis:    1) CAD s/p PCI and recent NSTEMI  - NSTEMI on 3/8/17 was managed medically without intervention.  - Currently asymptomatic.  - On ASA, plavix, atorvastatin.  - Instructed the patient to take metoprolol BID (12.5).  - LVEF was 50%, preserved.  - No ACEI because of low blood pressure.  - OK for the patient to travel.    2) Volume Overload  - Patient looks euvolemic on exam today except trace periankle edema  - Continue lasix 40 mg daily and additional 40 mg PRN for weight gain or SOB.  - Electrolytes are stable.    3) Bilateral Pleural Effusion  - Etiology unclear.  - But it has improved  with diuresis.  - Denies SOB. Effusion small on physical exam / CXR.  - Continue with lasix. Postpone VATS for now because of recent NSTEMI and no symptoms from pleural effusion.    4) Metastatic Pancreatic Cancer  - Follow up with hem/onc.    I discussed with the staff physician, Dr. Milner. Follow up in 2 months.    Tanner Leos MD  Cardiology Fellow, PGY6  Pager: [phone]  3/21/2017, 8:15 AM

## 2017-03-21 NOTE — MR AVS SNAPSHOT
[facility]  [address]  [facility] [address]  Phone: [phone]                  Lashay Whitley   3/21/2017 8:00 AM   Office Visit    Description:  Female : 1960   Provider:  Tanner Leos MD   Department:  [facility]           Reason for Visit     NSTEMI (non-ST elevated myocardial infarction)           Diagnoses this Visit        Comments    Coronary artery disease involving native coronary artery of native heart without angina pectoris    -  Primary     Bone metastases         Type 2 diabetes mellitus without complication, with long-term current use of insulin         Hyperlipidemia, unspecified hyperlipidemia type                To Do List           Goals (5 Years of Data)     None      Follow-Up and Disposition     Return in about 2 months (around 2017).      [facility]     [facility] Nurse Care Line -  Assistance  Registered nurses in the [facility] Call Center provide clinical advisement, health education, appointment booking, and other advisory services.  Call for this free service at [phone].             Medications           Message regarding Medications     Verify the changes and/or additions to your medication regime listed below are the same as discussed with your clinician today.  If any of these changes or additions are incorrect, please notify your healthcare provider.             Verify that the below list of medications is an accurate representation of the medications you are currently taking.  If none reported, the list may be blank. If incorrect, please contact your healthcare provider. Carry this list with you in case of emergency.           Current Medications     ascorbic acid, vitamin C, (VITAMIN C) 500 MG tablet Take 500 mg by mouth 2 (two) times daily.    aspirin (ECOTRIN) 81 MG EC tablet Take 81 mg by mouth once daily.    atorvastatin (LIPITOR) 40 MG tablet Take 1 tablet (40 mg total) by mouth once daily.     "BACILLUS COAGULANS (PROBIOTIC, B. COAGULANS, ORAL) Take by mouth.    BD ULTRA-FINE EDWIN PEN NEEDLES 32 x 5/32 " Ndle Uses 5 times daily, on multiple daily insulin injections    clopidogrel (PLAVIX) 75 mg tablet Take 1 tablet (75 mg total) by mouth once daily.    diazePAM (VALIUM) 5 MG tablet Take 1 tablet (5 mg total) by mouth every 12 (twelve) hours as needed (muscle spasms).    furosemide (LASIX) 40 MG tablet Take 1 tablet (40 mg total) by mouth 2 (two) times daily.    gabapentin (NEURONTIN) 300 MG capsule Take 1 capsule (300 mg total) by mouth 3 (three) times daily.    hydrocodone-acetaminophen 5-325mg (NORCO) 5-325 mg per tablet Take 1 tablet by mouth every 6 (six) hours as needed for Pain.    insulin aspart (NOVOLOG FLEXPEN) 100 unit/mL InPn pen Inject 6 Units into the skin 3 (three) times daily with meals. With correction scale for 33 units max TDD    insulin glargine, TOUJEO, (TOUJEO SOLOSTAR) 300 unit/mL (1.5 mL) InPn pen Inject 4 Units into the skin 2 (two) times daily.    KRILL OIL ORAL Take by mouth once daily.    levothyroxine (SYNTHROID) 100 MCG tablet Take 1 tablet (100 mcg total) by mouth once daily.    lipase-protease-amylase 36,000-114,000- 180,000 unit CpDR Take 3 capsules by mouth 3 (three) times daily with meals.    loratadine (CLARITIN) 10 mg tablet Take 10 mg by mouth once daily.    methadone (DOLOPHINE) 5 MG tablet Take 1 tablet (5 mg total) by mouth every 12 (twelve) hours.    metoclopramide HCl (REGLAN) 10 MG tablet Take 1 tablet (10 mg total) by mouth 4 (four) times daily before meals and nightly.    metoprolol tartrate (LOPRESSOR) 25 MG tablet Take 0.5 tablets (12.5 mg total) by mouth 2 (two) times daily.    morphine (MS CONTIN) 15 MG 12 hr tablet Take 3 tablets (45 mg total) by mouth every 8 (eight) hours.    multivitamin capsule Take 1 capsule by mouth once daily.    nitroGLYCERIN (NITROSTAT) 0.4 MG SL tablet Place 1 tablet (0.4 mg total) under the tongue every 5 (five) minutes as needed " "for Chest pain.    ondansetron (ZOFRAN-ODT) 8 MG TbDL Take 1 tablet (8 mg total) by mouth every 6 (six) hours as needed.    pantoprazole (PROTONIX) 40 MG tablet Take 1 tablet (40 mg total) by mouth once daily.    senna-docusate 8.6-50 mg (PERICOLACE) 8.6-50 mg per tablet Take 1 tablet by mouth daily as needed for Constipation.    vitamin D 1000 units Tab Take 185 mg by mouth once daily.           Clinical Reference Information           Your Vitals Were     BP Pulse Height Weight BMI    110/61 (BP Location: Left arm, Patient Position: Sitting, BP Method: Automatic) 63 4' 11" (1.499 m) 48 kg (105 lb 13.1 oz) 21.37 kg/m2      Blood Pressure          Most Recent Value    Right Arm BP - Sitting  111/61    Left Arm BP - Sitting  110/61    BP  110/61      Allergies as of 3/21/2017     Penicillins    Demerol [Meperidine]    Dilaudid [Hydromorphone (Bulk)]    Percocet [Oxycodone-acetaminophen]    Adhesive Tape-silicones      Immunizations Administered on Date of Encounter - 3/21/2017     None      Language Assistance Services     ATTENTION: Language assistance services are available, free of charge. Please call [phone].      ATENCIÓN: Si habla tj, tiene a mcleod disposición servicios gratuitos de asistencia lingüística. Llame al [phone].     BRITTANI Ý: N?u b?n nói Ti?ng Vi?t, có các d?ch v? h? tr? ngôn ng? mi?n phí dành cho b?n. G?i s? [phone].         [facility] complies with applicable Federal civil rights laws and does not discriminate on the basis of race, color, national origin, age, disability, or sex.        "

## 2017-03-30 ENCOUNTER — LAB VISIT (OUTPATIENT)
Dept: LAB | Facility: HOSPITAL | Age: 57
End: 2017-03-30
Attending: INTERNAL MEDICINE
Payer: COMMERCIAL

## 2017-03-30 DIAGNOSIS — C25.9 PANCREATIC ADENOCARCINOMA: Chronic | ICD-10-CM

## 2017-03-30 LAB
ALBUMIN SERPL BCP-MCNC: 3.6 G/DL
ALP SERPL-CCNC: 89 U/L
ALT SERPL W/O P-5'-P-CCNC: 18 U/L
ANION GAP SERPL CALC-SCNC: 10 MMOL/L
AST SERPL-CCNC: 25 U/L
BILIRUB SERPL-MCNC: 0.7 MG/DL
BUN SERPL-MCNC: 14 MG/DL
CALCIUM SERPL-MCNC: 9.8 MG/DL
CHLORIDE SERPL-SCNC: 104 MMOL/L
CO2 SERPL-SCNC: 25 MMOL/L
CREAT SERPL-MCNC: 0.8 MG/DL
ERYTHROCYTE [DISTWIDTH] IN BLOOD BY AUTOMATED COUNT: 13.7 %
EST. GFR  (AFRICAN AMERICAN): >60 ML/MIN/1.73 M^2
EST. GFR  (NON AFRICAN AMERICAN): >60 ML/MIN/1.73 M^2
GLUCOSE SERPL-MCNC: 154 MG/DL
HCT VFR BLD AUTO: 35.8 %
HGB BLD-MCNC: 11.7 G/DL
MCH RBC QN AUTO: 29.5 PG
MCHC RBC AUTO-ENTMCNC: 32.7 %
MCV RBC AUTO: 90 FL
NEUTROPHILS # BLD AUTO: 3.6 K/UL
PLATELET # BLD AUTO: 204 K/UL
PMV BLD AUTO: 9.8 FL
POTASSIUM SERPL-SCNC: 5.2 MMOL/L
PROT SERPL-MCNC: 7 G/DL
RBC # BLD AUTO: 3.96 M/UL
SODIUM SERPL-SCNC: 139 MMOL/L
WBC # BLD AUTO: 5.34 K/UL

## 2017-03-30 PROCEDURE — 36415 COLL VENOUS BLD VENIPUNCTURE: CPT

## 2017-03-30 PROCEDURE — 80053 COMPREHEN METABOLIC PANEL: CPT

## 2017-03-30 PROCEDURE — 85027 COMPLETE CBC AUTOMATED: CPT

## 2017-03-31 ENCOUNTER — OFFICE VISIT (OUTPATIENT)
Dept: HEMATOLOGY/ONCOLOGY | Facility: CLINIC | Age: 57
End: 2017-03-31
Attending: INTERNAL MEDICINE
Payer: COMMERCIAL

## 2017-03-31 ENCOUNTER — HOSPITAL ENCOUNTER (OUTPATIENT)
Dept: RADIOLOGY | Facility: OTHER | Age: 57
Discharge: HOME | End: 2017-03-31
Attending: INTERNAL MEDICINE
Payer: COMMERCIAL

## 2017-03-31 VITALS
DIASTOLIC BLOOD PRESSURE: 57 MMHG | SYSTOLIC BLOOD PRESSURE: 103 MMHG | RESPIRATION RATE: 20 BRPM | TEMPERATURE: 98 F | BODY MASS INDEX: 21.06 KG/M2 | HEART RATE: 61 BPM | OXYGEN SATURATION: 97 % | WEIGHT: 104.25 LBS

## 2017-03-31 DIAGNOSIS — C25.9 PANCREATIC ADENOCARCINOMA: Chronic | ICD-10-CM

## 2017-03-31 DIAGNOSIS — C78.7 LIVER METASTASES: ICD-10-CM

## 2017-03-31 DIAGNOSIS — S22.000A THORACIC COMPRESSION FRACTURE, CLOSED, INITIAL ENCOUNTER: ICD-10-CM

## 2017-03-31 PROCEDURE — 99999 PR PBB SHADOW E&M-EST. PATIENT-LVL III: CPT | Mod: PBBFAC,,, | Performed by: INTERNAL MEDICINE

## 2017-03-31 PROCEDURE — 71020 XR CHEST PA AND LATERAL: CPT | Mod: 26,,, | Performed by: RADIOLOGY

## 2017-03-31 PROCEDURE — 71020 XR CHEST PA AND LATERAL: CPT | Mod: TC

## 2017-03-31 PROCEDURE — 99214 OFFICE O/P EST MOD 30 MIN: CPT | Mod: S$GLB,,, | Performed by: INTERNAL MEDICINE

## 2017-03-31 RX ORDER — ERLOTINIB HYDROCHLORIDE 100 MG/1
100 TABLET, FILM COATED ORAL DAILY
Qty: 30 TABLET | Refills: 1 | Status: ON HOLD | OUTPATIENT
Start: 2017-03-31 | End: 2017-05-08

## 2017-03-31 RX ORDER — METHADONE HYDROCHLORIDE 5 MG/1
5 TABLET ORAL EVERY 12 HOURS
Qty: 60 TABLET | Refills: 0 | Status: SHIPPED | OUTPATIENT
Start: 2017-03-31 | End: 2017-04-25 | Stop reason: SDUPTHER

## 2017-03-31 NOTE — PROGRESS NOTES
Subjective:       Patient ID: Lashay Whitley is a 57 y.o. female.    Chief Complaint: Follow-up    HPI     Returns for follow-up  She has been off chemotherapy after hospitalization for a NSTEMI on 3/8/17 that was managed medically without intervention.  Currently on ASA, plavix, atorvastatin and metoprolol   ECHO revealed preserved EF    Restaging in the interval  2/27/17 CT scans:  1.  Multiple hypodense liver lesions concerning for metastatic disease.  2.  Interval development of moderate bilateral pleural effusions, which appears partially loculated on the right.  Mild scattered airspace opacities seen with significant volume loss and compressive atelectasis within the lower lobes.  Difficult to exclude potential underlying lesion.  3.  Postsurgical changes of Whipple procedure.  4.  Stable mild compression deformity involving the T4 vertebral body with new sclerotic foci involving C7, T1, and T5 concerning for osseous metastatic disease.    MRIs and plain films reviewed    Overall has been feeling better  Rare nausea- noted a couple times in the interval  Typically in car if has not eaten    Coughing more - noted after eating but denies aspiration  Feels a thick mucous  Clalritin has been helping  Mild shortness of breath  No orthopnea or PND  Reports has not been taking Lasix as has not noted swelling to warrant    Wants to take a trip with her  to [facility] and [place] - leaving asap and returning in 2-3 weeks if we give clearance today    This is a 57 year old female with metastatic pancreatic cancer, who most recently received [place]/Abraxane. Last dose 2/17/17.    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   Started on FOLFIRINOX as neoadjuvant therapy and completed 4 cycles.  Started on neoadjuvant chemotherapy and XRT which was completed with some complications.  Underwent a Whipple procedure on 4/12/16.   Pathology was reviewed.   Specimens: Head of  pancreas, duodenum, common bile duct, gallbladder  -Procedure: Pancreaticoduodenectomy (Whipple resection), partial pancreatectomy  -Tumor site: Pancreatic head  -Tumor size: 20 mm  -Histologic type: Ductal adenocarcinoma  -Histologic grade: Well-differentiated (grade 1 )  -Microscopic tumor extension: Tumor invades peripancreatic soft tissues  -Margins:  -Margins are negative for invasive carcinoma  -Distance to closest distance to closest retroperitoneal margin is 3 mm  -Distance to closest SMV margin is 1.5 mm  -Distance to closest pancreatic margin is 12 mm  -Treatment effect: Present, residual cancer with tumor regression but more than single cells or rare small groups of  cells (partial response, score 2)  -No lymphovascular invasion  -Perineural invasion is present  -Pathologic staging: ypT3 N1 MX  -Lymph nodes:  -Total number of lymph nodes examined: 25  -Total number of lymph nodes involved: 1  She started adjuvant gemcitabine but this was discontinued because of poor tolerance.  - she recurred with metastatic disease recently and has had rising   EUS with liver biopsy on 11/22/16 confirmed metastatic disease  CT scans reviewed as well  Bone involvement in T spine required XRT      Also of note she has a history of breast cancer    Review of Systems   Constitutional: Negative for activity change, appetite change, chills, diaphoresis, fatigue, fever and unexpected weight change.   HENT: Negative for congestion, dental problem, ear pain, hearing loss, mouth sores, nosebleeds, rhinorrhea, tinnitus and trouble swallowing.    Eyes: Negative for redness and visual disturbance.   Respiratory: Positive for cough and shortness of breath. Negative for chest tightness and wheezing.    Cardiovascular: Negative for chest pain, palpitations and leg swelling.   Gastrointestinal: Positive for nausea. Negative for abdominal distention, abdominal pain, blood in stool, constipation, diarrhea and vomiting.    Genitourinary: Negative for decreased urine volume, difficulty urinating, dysuria, frequency and hematuria.   Musculoskeletal: Negative for arthralgias, back pain, gait problem, joint swelling and neck pain.   Skin: Negative for pallor and rash.   Neurological: Negative for dizziness, weakness, light-headedness, numbness and headaches.   Hematological: Negative for adenopathy. Does not bruise/bleed easily.   Psychiatric/Behavioral: Negative for confusion, dysphoric mood and sleep disturbance.       Objective:      Physical Exam   Constitutional: She is oriented to person, place, and time. She appears well-developed and well-nourished. No distress.   Presents with her    HENT:   Head: Normocephalic and atraumatic.   Right Ear: External ear normal.   Left Ear: External ear normal.   Nose: Nose normal.   Mouth/Throat: Oropharynx is clear and moist. No oropharyngeal exudate.   Eyes: Conjunctivae and EOM are normal. Pupils are equal, round, and reactive to light. Right eye exhibits no discharge. Left eye exhibits no discharge. No scleral icterus. Right pupil is round and reactive. Left pupil is round and reactive.   Neck: Normal range of motion. Neck supple. No tracheal deviation present. No thyromegaly present.   Cardiovascular: Normal rate, regular rhythm, normal heart sounds and intact distal pulses.  Exam reveals no gallop and no friction rub.    No murmur heard.  Pulmonary/Chest: Effort normal and breath sounds normal. No respiratory distress. She has no wheezes. She has no rales. She exhibits no tenderness.   decr breath sounds left > right mild at bases   Abdominal: Soft. Bowel sounds are normal. She exhibits no distension and no mass. There is no hepatosplenomegaly. There is no tenderness. There is no rebound and no guarding.   No organomegaly   Musculoskeletal: Normal range of motion. She exhibits no edema, tenderness or deformity.   Lymphadenopathy:        Head (right side): No submandibular adenopathy  present.        Head (left side): No submandibular adenopathy present.     She has no cervical adenopathy.        Right cervical: No superficial cervical, no deep cervical and no posterior cervical adenopathy present.       Left cervical: No superficial cervical, no deep cervical and no posterior cervical adenopathy present.        Right: No inguinal and no supraclavicular adenopathy present.        Left: No inguinal and no supraclavicular adenopathy present.   Neurological: She is alert and oriented to person, place, and time. She has normal strength. No cranial nerve deficit or sensory deficit. She exhibits normal muscle tone. Coordination normal.   Skin: Skin is warm and dry. No bruising, no lesion, no petechiae and no rash noted. She is not diaphoretic. No erythema. No pallor.   Psychiatric: She has a normal mood and affect. Her behavior is normal. Judgment and thought content normal. Her mood appears not anxious. She does not exhibit a depressed mood.   Nursing note and vitals reviewed.    Labs- reviewed  Assessment:       1. Pancreatic adenocarcinoma    2. Liver metastases    3. Thoracic compression fracture, closed, initial encounter        Plan:     1-3. Pain well controlled  Off therapy currently   Scans with progression - but, she does not have good comparison baseline films coordinating with start of chemotherapy  Subjectively she feels better  Bone lesions reviewed- no role for XRT currently  Monitor closely  Not eligible for trial currently with recent NSTEMI  Discussed Tarceva as possible option  Personal goals are for travel and daughter's upcoming wedding in June

## 2017-04-03 ENCOUNTER — TELEPHONE (OUTPATIENT)
Dept: PHARMACY | Facility: CLINIC | Age: 57
End: 2017-04-03

## 2017-04-05 ENCOUNTER — TELEPHONE (OUTPATIENT)
Dept: HEMATOLOGY/ONCOLOGY | Facility: CLINIC | Age: 57
End: 2017-04-05

## 2017-04-05 NOTE — TELEPHONE ENCOUNTER
Called pharmacy.   Answered questions.   Pharmacy verbalized understanding and stated they would fax outcome to office.       ----- Message from Christine Mathew sent at 4/5/2017  3:09 PM CDT -----  Contact: Pharmacy  Has addtional questions for erlotinib (TARCEVA) 100 MG tablet    Call: [phone]     Ref# [id number]

## 2017-04-07 NOTE — TELEPHONE ENCOUNTER
Patient's plan has denied coverage of Tarceva because plan requires it to be used in combination with gemcitabine for pancreatic cancer.    We do have the option to appeal.

## 2017-04-17 DIAGNOSIS — Z85.07 HISTORY OF PANCREATIC CANCER: ICD-10-CM

## 2017-04-17 RX ORDER — MORPHINE SULFATE 15 MG/1
30 TABLET, FILM COATED, EXTENDED RELEASE ORAL EVERY 8 HOURS
Qty: 90 TABLET | Refills: 0 | Status: SHIPPED | OUTPATIENT
Start: 2017-04-17 | End: 2017-04-25 | Stop reason: SDUPTHER

## 2017-04-17 NOTE — TELEPHONE ENCOUNTER
DOCUMENTATION ONLY:  Prior authorization for tarceva approved from 4/3/17-4/2/18    Copat card info:  BIN: [id number]  ID: [id number]  GRP: [id number]  PCN: [id number]

## 2017-04-25 ENCOUNTER — OFFICE VISIT (OUTPATIENT)
Dept: HEMATOLOGY/ONCOLOGY | Facility: CLINIC | Age: 57
End: 2017-04-25
Attending: INTERNAL MEDICINE
Payer: COMMERCIAL

## 2017-04-25 ENCOUNTER — HOSPITAL ENCOUNTER (OUTPATIENT)
Dept: RADIOLOGY | Facility: OTHER | Age: 57
Discharge: HOME | End: 2017-04-25
Attending: INTERNAL MEDICINE
Payer: COMMERCIAL

## 2017-04-25 ENCOUNTER — TELEPHONE (OUTPATIENT)
Dept: HEMATOLOGY/ONCOLOGY | Facility: CLINIC | Age: 57
End: 2017-04-25

## 2017-04-25 VITALS
TEMPERATURE: 98 F | OXYGEN SATURATION: 96 % | WEIGHT: 100.31 LBS | HEART RATE: 85 BPM | DIASTOLIC BLOOD PRESSURE: 64 MMHG | SYSTOLIC BLOOD PRESSURE: 123 MMHG | RESPIRATION RATE: 20 BRPM | BODY MASS INDEX: 20.26 KG/M2

## 2017-04-25 DIAGNOSIS — C79.51 BONE METASTASES: ICD-10-CM

## 2017-04-25 DIAGNOSIS — Z85.07 HISTORY OF PANCREATIC CANCER: ICD-10-CM

## 2017-04-25 DIAGNOSIS — S22.000A THORACIC COMPRESSION FRACTURE, CLOSED, INITIAL ENCOUNTER: ICD-10-CM

## 2017-04-25 DIAGNOSIS — C25.9 PANCREATIC ADENOCARCINOMA: Primary | Chronic | ICD-10-CM

## 2017-04-25 DIAGNOSIS — J90 PLEURAL EFFUSION, BILATERAL: ICD-10-CM

## 2017-04-25 DIAGNOSIS — R06.00 DYSPNEA, UNSPECIFIED TYPE: ICD-10-CM

## 2017-04-25 DIAGNOSIS — C25.9 PANCREATIC ADENOCARCINOMA: Chronic | ICD-10-CM

## 2017-04-25 DIAGNOSIS — R09.02 HYPOXIA: ICD-10-CM

## 2017-04-25 DIAGNOSIS — C78.7 LIVER METASTASES: ICD-10-CM

## 2017-04-25 PROCEDURE — 99214 OFFICE O/P EST MOD 30 MIN: CPT | Mod: S$GLB,,, | Performed by: INTERNAL MEDICINE

## 2017-04-25 PROCEDURE — 71020 XR CHEST PA AND LATERAL: CPT | Mod: 26,,, | Performed by: RADIOLOGY

## 2017-04-25 PROCEDURE — 99999 PR PBB SHADOW E&M-EST. PATIENT-LVL III: CPT | Mod: PBBFAC,,, | Performed by: INTERNAL MEDICINE

## 2017-04-25 PROCEDURE — 71020 XR CHEST PA AND LATERAL: CPT | Mod: TC

## 2017-04-25 RX ORDER — MORPHINE SULFATE 15 MG/1
30 TABLET, FILM COATED, EXTENDED RELEASE ORAL EVERY 8 HOURS
Qty: 90 TABLET | Refills: 0 | Status: SHIPPED | OUTPATIENT
Start: 2017-04-25 | End: 2017-04-25 | Stop reason: SDUPTHER

## 2017-04-25 RX ORDER — MORPHINE SULFATE 15 MG/1
30 TABLET, FILM COATED, EXTENDED RELEASE ORAL EVERY 8 HOURS
Qty: 90 TABLET | Refills: 0 | Status: CANCELLED | OUTPATIENT
Start: 2017-04-25

## 2017-04-25 RX ORDER — METHADONE HYDROCHLORIDE 5 MG/1
5 TABLET ORAL EVERY 12 HOURS
Qty: 60 TABLET | Refills: 0 | Status: SHIPPED | OUTPATIENT
Start: 2017-04-25 | End: 2017-04-25 | Stop reason: SDUPTHER

## 2017-04-25 RX ORDER — METHADONE HYDROCHLORIDE 5 MG/1
5 TABLET ORAL EVERY 12 HOURS
Qty: 60 TABLET | Refills: 0 | Status: ON HOLD | OUTPATIENT
Start: 2017-04-25 | End: 2017-05-11

## 2017-04-25 RX ORDER — HYDROCODONE BITARTRATE AND ACETAMINOPHEN 5; 325 MG/1; MG/1
1 TABLET ORAL EVERY 6 HOURS PRN
Qty: 120 TABLET | Refills: 0 | Status: SHIPPED | OUTPATIENT
Start: 2017-04-25 | End: 2017-05-02 | Stop reason: SDUPTHER

## 2017-04-25 RX ORDER — MORPHINE SULFATE 15 MG/1
30 TABLET, FILM COATED, EXTENDED RELEASE ORAL EVERY 8 HOURS
Qty: 90 TABLET | Refills: 0 | Status: ON HOLD | OUTPATIENT
Start: 2017-04-25 | End: 2017-05-11 | Stop reason: HOSPADM

## 2017-04-25 RX ORDER — METHADONE HYDROCHLORIDE 5 MG/1
5 TABLET ORAL EVERY 12 HOURS
Qty: 60 TABLET | Refills: 0 | Status: CANCELLED | OUTPATIENT
Start: 2017-04-25

## 2017-04-25 RX ORDER — HYDROCODONE BITARTRATE AND ACETAMINOPHEN 5; 325 MG/1; MG/1
1 TABLET ORAL EVERY 6 HOURS PRN
Status: CANCELLED | OUTPATIENT
Start: 2017-04-25

## 2017-04-25 RX ORDER — HYDROCODONE BITARTRATE AND ACETAMINOPHEN 5; 325 MG/1; MG/1
1 TABLET ORAL EVERY 6 HOURS PRN
Qty: 120 TABLET | Refills: 0 | Status: SHIPPED | OUTPATIENT
Start: 2017-04-25 | End: 2017-04-25

## 2017-04-25 NOTE — TELEPHONE ENCOUNTER
Returned call to pt.   Pt questioned which company her O2 would be from.   After speaking with Yuli JOHNSON, informed her [facility] and provided pt with number to ensure that O2 delivered to house today.   Pt verbalized understanding.        ----- Message from Iona Last sent at 4/25/2017  4:27 PM CDT -----  Patient would like the nurse to give her a call back about her oxygen. She can be reached at

## 2017-04-25 NOTE — PROGRESS NOTES
Subjective:       Patient ID: Lashay Whitley is a 57 y.o. female.    Chief Complaint: Follow-up    HPI     Returns for follow-up of metastatic pancreatic cancer  Off therapy  Traveled x 15 days- went to [place] and finished in [place] but had to return due to worsening dyspnea  Today on arrival to clinic she is visibly short of breath and in distress (pulse ox as below)  Reports chest pain when her O2 is low as well- all improved when O2 placed by nasal cannula  3 lbs down while traveling    Most recent restaging in the interval  2/27/17 CT scans:  1.  Multiple hypodense liver lesions concerning for metastatic disease.  2.  Interval development of moderate bilateral pleural effusions, which appears partially loculated on the right.  Mild scattered airspace opacities seen with significant volume loss and compressive atelectasis within the lower lobes.  Difficult to exclude potential underlying lesion.  3.  Postsurgical changes of Whipple procedure.  4.  Stable mild compression deformity involving the T4 vertebral body with new sclerotic foci involving C7, T1, and T5 concerning for osseous metastatic disease.     This is a 57 year old female with metastatic pancreatic cancer, who most recently received [place]/Abraxane. Last dose 2/17/17.     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   Started on FOLFIRINOX as neoadjuvant therapy and completed 4 cycles.  Started on neoadjuvant chemotherapy and XRT which was completed with some complications.  Underwent a Whipple procedure on 4/12/16.   Pathology was reviewed.   Specimens: Head of pancreas, duodenum, common bile duct, gallbladder  -Procedure: Pancreaticoduodenectomy (Whipple resection), partial pancreatectomy  -Tumor site: Pancreatic head  -Tumor size: 20 mm  -Histologic type: Ductal adenocarcinoma  -Histologic grade: Well-differentiated (grade 1 )  -Microscopic tumor extension: Tumor invades peripancreatic soft  tissues  -Margins:  -Margins are negative for invasive carcinoma  -Distance to closest distance to closest retroperitoneal margin is 3 mm  -Distance to closest SMV margin is 1.5 mm  -Distance to closest pancreatic margin is 12 mm  -Treatment effect: Present, residual cancer with tumor regression but more than single cells or rare small groups of  cells (partial response, score 2)  -No lymphovascular invasion  -Perineural invasion is present  -Pathologic staging: ypT3 N1 MX  -Lymph nodes:  -Total number of lymph nodes examined: 25  -Total number of lymph nodes involved: 1  She started adjuvant gemcitabine but this was discontinued because of poor tolerance.  - she recurred with metastatic disease recently and has had rising   EUS with liver biopsy on 11/22/16 confirmed metastatic disease  CT scans reviewed as well  Bone involvement in T spine required XRT      Also of note she has a history of breast cancer    Review of Systems   Constitutional: Positive for activity change, appetite change and fatigue. Negative for chills, diaphoresis, fever and unexpected weight change.   HENT: Negative for congestion, dental problem, ear pain, hearing loss, mouth sores, nosebleeds, rhinorrhea, tinnitus and trouble swallowing.    Eyes: Negative for redness and visual disturbance.   Respiratory: Positive for cough and shortness of breath. Negative for chest tightness and wheezing.    Cardiovascular: Positive for chest pain. Negative for palpitations and leg swelling.   Gastrointestinal: Positive for nausea. Negative for abdominal distention, abdominal pain, blood in stool, constipation, diarrhea and vomiting.   Genitourinary: Negative for decreased urine volume, difficulty urinating, dysuria, frequency and hematuria.   Musculoskeletal: Positive for arthralgias. Negative for back pain, gait problem, joint swelling and neck pain.   Skin: Negative for pallor and rash.   Neurological: Positive for headaches (better since O2 placed  today). Negative for dizziness, weakness, light-headedness and numbness.   Hematological: Negative for adenopathy. Does not bruise/bleed easily.   Psychiatric/Behavioral: Negative for confusion, dysphoric mood and sleep disturbance.       Objective:      Physical Exam   Constitutional: She is oriented to person, place, and time. No distress.   Presents with her   Thinner   HENT:   Head: Normocephalic and atraumatic.   Right Ear: External ear normal.   Left Ear: External ear normal.   Nose: Nose normal.   Mouth/Throat: Oropharynx is clear and moist. No oropharyngeal exudate.   Eyes: Conjunctivae and EOM are normal. Pupils are equal, round, and reactive to light. Right eye exhibits no discharge. Left eye exhibits no discharge. No scleral icterus. Right pupil is round and reactive. Left pupil is round and reactive.   Neck: Normal range of motion. Neck supple. No tracheal deviation present. No thyromegaly present.   Cardiovascular: Normal rate, regular rhythm, normal heart sounds and intact distal pulses.  Exam reveals no gallop and no friction rub.    No murmur heard.  Pulmonary/Chest: She is in respiratory distress. She has no wheezes. She has no rales. She exhibits no tenderness.   decr breath sounds bilateral bases  Pulse ox 84% on room air while sitting in wheelchair   Abdominal: Soft. Bowel sounds are normal. She exhibits no distension and no mass. There is no hepatosplenomegaly. There is no tenderness. There is no rebound and no guarding.   No organomegaly   Musculoskeletal: Normal range of motion. She exhibits no edema, tenderness or deformity.   Lymphadenopathy:        Head (right side): No submandibular adenopathy present.        Head (left side): No submandibular adenopathy present.     She has no cervical adenopathy.        Right cervical: No superficial cervical, no deep cervical and no posterior cervical adenopathy present.       Left cervical: No superficial cervical, no deep cervical and no  posterior cervical adenopathy present.        Right: No inguinal and no supraclavicular adenopathy present.        Left: No inguinal and no supraclavicular adenopathy present.   Neurological: She is alert and oriented to person, place, and time. She has normal strength. No cranial nerve deficit or sensory deficit. She exhibits normal muscle tone. Coordination normal.   Skin: Skin is warm and dry. No bruising, no lesion, no petechiae and no rash noted. She is not diaphoretic. No erythema. No pallor.   Psychiatric: She has a normal mood and affect. Her behavior is normal. Judgment and thought content normal. Her mood appears not anxious. She does not exhibit a depressed mood.   Nursing note and vitals reviewed.    CXR- pending  Assessment:       1. Pancreatic adenocarcinoma    2. Liver metastases    3. Bone metastases    4. Dyspnea, unspecified type    5. Pleural effusion, bilateral    6. History of pancreatic cancer    7. Thoracic compression fracture, closed, initial encounter    8. Hypoxia        Plan:     1-3. Therapy on hold  Focusing on QOL  Hopes to travel to see son in [place]  4-5,8. She has baseline emphysema, now with pleural effusions from cancer and sats 84% on RA at rest  Needs home 02  Will have pulm look at xrays

## 2017-04-26 ENCOUNTER — TELEPHONE (OUTPATIENT)
Dept: HEMATOLOGY/ONCOLOGY | Facility: CLINIC | Age: 57
End: 2017-04-26

## 2017-04-26 NOTE — TELEPHONE ENCOUNTER
Called pt and informed her of following from Dr. Nguyễn.   Pt verbalized understanding.   Pt questioned why she is needing oxygen now---physiological concerns.   I informed pt I would fwd message to Dr. Nguyễn to find exact reasoning.   Pt verbalized understanding.     Message routed to Dr. Nguyễn.       ----- Message from Wendi Nguyễn MD sent at 4/26/2017  4:51 PM CDT -----  Please let her know not enough fluid to need tapping    ----- Message -----     From: Luke Deluna MD     Sent: 4/26/2017   8:55 AM       To: Wendi Nguyễn MD    I am definitely ok with the current approach.  If her O2 requirement increases, consider an US-guided thoracentesis.    blp  ----- Message -----     From: Wendi Nguyễn MD     Sent: 4/25/2017   4:50 PM       To: Luke Deluna MD    She meets criteria for O2 today- has been focused on QOl and travel and got a great 15 day trip in  We did do a CXR- I am not convinced dealing with effusion would help much but wanted to run by you  She immediately felt better when O2 placed on her  Thanks  Wendi

## 2017-04-30 ENCOUNTER — TELEPHONE (OUTPATIENT)
Dept: HEMATOLOGY/ONCOLOGY | Facility: CLINIC | Age: 57
End: 2017-04-30

## 2017-04-30 DIAGNOSIS — R60.9 EDEMA, UNSPECIFIED TYPE: ICD-10-CM

## 2017-04-30 RX ORDER — FUROSEMIDE 40 MG/1
TABLET ORAL
Qty: 60 TABLET | Refills: 1 | Status: ON HOLD | OUTPATIENT
Start: 2017-04-30 | End: 2017-05-11 | Stop reason: HOSPADM

## 2017-05-01 NOTE — TELEPHONE ENCOUNTER
Returned call to pt .    stated that pt has been satting at 88-91% on 3LNC.    stated that pt has been having a lot of congestion and pt was wondering if she needed to go to ER or just receive a breathing treatment at home.   I informed  that ER is most likely not going to be recommended but that should be satting higher if on 3LNC.   Informed  I would find out Dr. Nguyễn's recs and call back.    verbalized understanding.     Message routed to Dr. Nguyễn.         ----- Message from Christine Mathew sent at 5/1/2017 12:59 PM CDT -----  Contact: Spouse  Would like Orin to call him, did not state specific details. Call: [phone]

## 2017-05-02 ENCOUNTER — DOCUMENTATION ONLY (OUTPATIENT)
Dept: HEMATOLOGY/ONCOLOGY | Facility: CLINIC | Age: 57
End: 2017-05-02

## 2017-05-02 ENCOUNTER — OFFICE VISIT (OUTPATIENT)
Dept: HEMATOLOGY/ONCOLOGY | Facility: CLINIC | Age: 57
End: 2017-05-02
Attending: INTERNAL MEDICINE
Payer: COMMERCIAL

## 2017-05-02 VITALS
BODY MASS INDEX: 20.62 KG/M2 | SYSTOLIC BLOOD PRESSURE: 117 MMHG | RESPIRATION RATE: 20 BRPM | HEART RATE: 72 BPM | DIASTOLIC BLOOD PRESSURE: 60 MMHG | WEIGHT: 102.06 LBS | OXYGEN SATURATION: 96 % | TEMPERATURE: 98 F

## 2017-05-02 DIAGNOSIS — C78.7 LIVER METASTASES: Primary | ICD-10-CM

## 2017-05-02 DIAGNOSIS — C79.51 BONE METASTASES: ICD-10-CM

## 2017-05-02 DIAGNOSIS — C25.9 PANCREATIC ADENOCARCINOMA: Chronic | ICD-10-CM

## 2017-05-02 DIAGNOSIS — J90 PLEURAL EFFUSION: ICD-10-CM

## 2017-05-02 PROCEDURE — 99999 PR PBB SHADOW E&M-EST. PATIENT-LVL V: CPT | Mod: PBBFAC,,, | Performed by: INTERNAL MEDICINE

## 2017-05-02 PROCEDURE — 99213 OFFICE O/P EST LOW 20 MIN: CPT | Mod: S$GLB,,, | Performed by: INTERNAL MEDICINE

## 2017-05-02 RX ORDER — IPRATROPIUM BROMIDE 0.5 MG/2.5ML
500 SOLUTION RESPIRATORY (INHALATION) 4 TIMES DAILY
Qty: 30 VIAL | Refills: 1 | Status: SHIPPED | OUTPATIENT
Start: 2017-05-02 | End: 2018-05-02

## 2017-05-02 RX ORDER — HYDROCODONE BITARTRATE AND ACETAMINOPHEN 5; 325 MG/1; MG/1
1 TABLET ORAL EVERY 6 HOURS PRN
Qty: 120 TABLET | Refills: 0 | Status: ON HOLD | OUTPATIENT
Start: 2017-05-02 | End: 2017-05-11 | Stop reason: HOSPADM

## 2017-05-02 NOTE — PROGRESS NOTES
Received referral from the clinic that patient is in need of a nebulizer machine for home use. Patient recently received home oxygen though [facility]. Faxed referral to [facility] to fill order.

## 2017-05-02 NOTE — PROGRESS NOTES
Subjective:       Patient ID: Lashay Whitley is a 57 y.o. female.    Chief Complaint: Follow-up    HPI     Returns for weekly follow-up  More short of breath  Off and on congestion noted    Returns for follow-up of metastatic pancreatic cancer  She is being monitored off therapy and focusing on QOL     Most recent restaging in the interval  2/27/17 CT scans:  1.  Multiple hypodense liver lesions concerning for metastatic disease.  2.  Interval development of moderate bilateral pleural effusions, which appears partially loculated on the right.  Mild scattered airspace opacities seen with significant volume loss and compressive atelectasis within the lower lobes.  Difficult to exclude potential underlying lesion.  3.  Postsurgical changes of Whipple procedure.  4.  Stable mild compression deformity involving the T4 vertebral body with new sclerotic foci involving C7, T1, and T5 concerning for osseous metastatic disease.      This is a 57 year old female with metastatic pancreatic cancer, who most recently received [place]/Abraxane. Last dose 2/17/17.      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   Started on FOLFIRINOX as neoadjuvant therapy and completed 4 cycles.  Started on neoadjuvant chemotherapy and XRT which was completed with some complications.  Underwent a Whipple procedure on 4/12/16.   Pathology was reviewed.   Specimens: Head of pancreas, duodenum, common bile duct, gallbladder  -Procedure: Pancreaticoduodenectomy (Whipple resection), partial pancreatectomy  -Tumor site: Pancreatic head  -Tumor size: 20 mm  -Histologic type: Ductal adenocarcinoma  -Histologic grade: Well-differentiated (grade 1 )  -Microscopic tumor extension: Tumor invades peripancreatic soft tissues  -Margins:  -Margins are negative for invasive carcinoma  -Distance to closest distance to closest retroperitoneal margin is 3 mm  -Distance to closest SMV margin is 1.5 mm  -Distance to closest  pancreatic margin is 12 mm  -Treatment effect: Present, residual cancer with tumor regression but more than single cells or rare small groups of  cells (partial response, score 2)  -No lymphovascular invasion  -Perineural invasion is present  -Pathologic staging: ypT3 N1 MX  -Lymph nodes:  -Total number of lymph nodes examined: 25  -Total number of lymph nodes involved: 1  She started adjuvant gemcitabine but this was discontinued because of poor tolerance.  - she recurred with metastatic disease recently and has had rising   EUS with liver biopsy on 11/22/16 confirmed metastatic disease  CT scans reviewed as well  Bone involvement in T spine required XRT      Also of note she has a history of breast cancer    Review of Systems   Constitutional: Positive for activity change, appetite change, fatigue and unexpected weight change. Negative for chills, diaphoresis and fever.   HENT: Negative for congestion, dental problem, ear pain, hearing loss, mouth sores, nosebleeds, rhinorrhea, tinnitus and trouble swallowing.    Eyes: Negative for redness and visual disturbance.   Respiratory: Positive for cough and shortness of breath. Negative for chest tightness and wheezing.    Cardiovascular: Positive for chest pain. Negative for palpitations and leg swelling.   Gastrointestinal: Positive for nausea. Negative for abdominal distention, abdominal pain, blood in stool, constipation, diarrhea and vomiting.   Genitourinary: Negative for decreased urine volume, difficulty urinating, dysuria, frequency and hematuria.   Musculoskeletal: Positive for arthralgias. Negative for back pain, gait problem, joint swelling and neck pain.   Skin: Negative for pallor and rash.   Neurological: Positive for headaches (better since O2 placed today). Negative for dizziness, weakness, light-headedness and numbness.   Hematological: Negative for adenopathy. Does not bruise/bleed easily.   Psychiatric/Behavioral: Negative for confusion,  dysphoric mood and sleep disturbance.       Objective:      Physical Exam   Constitutional: She is oriented to person, place, and time. No distress.   Presents with her   Thinner   Eyes: Right eye exhibits discharge. Right pupil is round and reactive. Left pupil is round and reactive.   Neck: No tracheal deviation present. No thyromegaly present.   Cardiovascular: Normal rate, regular rhythm, normal heart sounds and intact distal pulses.  Exam reveals no gallop and no friction rub.    No murmur heard.  Pulmonary/Chest: She is in respiratory distress. She has no wheezes. She has no rales. She exhibits no tenderness.   decr breath sounds bilateral bases   Abdominal: Soft. Bowel sounds are normal. She exhibits no distension and no mass. There is no hepatosplenomegaly. There is no tenderness. There is no rebound and no guarding.   No organomegaly   Musculoskeletal: Normal range of motion. She exhibits no edema, tenderness or deformity.   Lymphadenopathy:        Head (right side): No submandibular adenopathy present.        Head (left side): No submandibular adenopathy present.     She has no cervical adenopathy.        Right cervical: No superficial cervical, no deep cervical and no posterior cervical adenopathy present.       Left cervical: No superficial cervical, no deep cervical and no posterior cervical adenopathy present.        Right: No inguinal and no supraclavicular adenopathy present.        Left: No inguinal and no supraclavicular adenopathy present.   Neurological: She is alert and oriented to person, place, and time. She has normal strength. No cranial nerve deficit or sensory deficit. She exhibits normal muscle tone. Coordination normal.   Skin: Skin is warm and dry. No bruising, no lesion, no petechiae and no rash noted. She is not diaphoretic. No erythema. No pallor.   Psychiatric: She has a normal mood and affect. Her behavior is normal. Judgment and thought content normal. Her mood appears not  anxious. She does not exhibit a depressed mood.   Nursing note and vitals reviewed.    Labs- reviewed  Assessment:       1. Liver metastases    2. Pancreatic adenocarcinoma    3. Bone metastases    4. Pleural effusion        Plan:     1-3. Monitored off therapy  4. Seeing Dr. Metcalf tomorrow

## 2017-05-03 ENCOUNTER — OFFICE VISIT (OUTPATIENT)
Dept: PULMONOLOGY | Facility: CLINIC | Age: 57
End: 2017-05-03
Payer: COMMERCIAL

## 2017-05-03 VITALS
OXYGEN SATURATION: 96 % | BODY MASS INDEX: 20.66 KG/M2 | SYSTOLIC BLOOD PRESSURE: 104 MMHG | DIASTOLIC BLOOD PRESSURE: 60 MMHG | HEIGHT: 59 IN | HEART RATE: 66 BPM | WEIGHT: 102.5 LBS

## 2017-05-03 DIAGNOSIS — C25.9 PANCREATIC CANCER METASTASIZED TO LUNG: ICD-10-CM

## 2017-05-03 DIAGNOSIS — J96.01 ACUTE RESPIRATORY FAILURE WITH HYPOXIA: Primary | ICD-10-CM

## 2017-05-03 DIAGNOSIS — C78.00 PANCREATIC CANCER METASTASIZED TO LUNG: ICD-10-CM

## 2017-05-03 DIAGNOSIS — J90 PLEURAL EFFUSION, RIGHT: ICD-10-CM

## 2017-05-03 LAB
APPEARANCE FLD: NORMAL
BODY FLD TYPE: NORMAL
BODY FLUID SOURCE, LDH: NORMAL
COLOR FLD: YELLOW
EOSINOPHIL NFR FLD MANUAL: 13 %
GLUCOSE FLD-MCNC: 131 MG/DL
LDH FLD L TO P-CCNC: 137 U/L
LYMPHOCYTES NFR FLD MANUAL: 31 %
MESOTHL CELL NFR FLD MANUAL: 3 %
MONOS+MACROS NFR FLD MANUAL: 32 %
NEUTROPHILS NFR FLD MANUAL: 21 %
PROT FLD-MCNC: 4 G/DL
SPECIMEN SOURCE: NORMAL
SPECIMEN SOURCE: NORMAL
WBC # FLD: 358 /CU MM

## 2017-05-03 PROCEDURE — 88341 IMHCHEM/IMCYTCHM EA ADD ANTB: CPT | Mod: 26,,, | Performed by: PATHOLOGY

## 2017-05-03 PROCEDURE — 83615 LACTATE (LD) (LDH) ENZYME: CPT

## 2017-05-03 PROCEDURE — 99999 PR PBB SHADOW E&M-EST. PATIENT-LVL III: CPT | Mod: PBBFAC,,, | Performed by: INTERNAL MEDICINE

## 2017-05-03 PROCEDURE — 88305 TISSUE EXAM BY PATHOLOGIST: CPT | Mod: 26,,, | Performed by: PATHOLOGY

## 2017-05-03 PROCEDURE — 88305 TISSUE EXAM BY PATHOLOGIST: CPT | Performed by: PATHOLOGY

## 2017-05-03 PROCEDURE — 82945 GLUCOSE OTHER FLUID: CPT

## 2017-05-03 PROCEDURE — 89051 BODY FLUID CELL COUNT: CPT

## 2017-05-03 PROCEDURE — 88112 CYTOPATH CELL ENHANCE TECH: CPT | Mod: 26,,, | Performed by: PATHOLOGY

## 2017-05-03 PROCEDURE — 88342 IMHCHEM/IMCYTCHM 1ST ANTB: CPT | Mod: 26,,, | Performed by: PATHOLOGY

## 2017-05-03 PROCEDURE — 99214 OFFICE O/P EST MOD 30 MIN: CPT | Mod: 25,S$GLB,, | Performed by: INTERNAL MEDICINE

## 2017-05-03 PROCEDURE — 84157 ASSAY OF PROTEIN OTHER: CPT

## 2017-05-03 PROCEDURE — 32554 ASPIRATE PLEURA W/O IMAGING: CPT | Mod: S$GLB,,, | Performed by: INTERNAL MEDICINE

## 2017-05-03 NOTE — PROGRESS NOTES
"Subjective:       Patient ID: Lashay Whitley is a 57 y.o. female.    Chief Complaint: Pleural Effusion    HPI Comments: 57 year old with a history of metastatic pancreatic cancer.  Patient recently traveled to [place] in a car and suddenly developed worsening GOLD and hypoxia.  Has been on supplemental oxygen without much relief of symptoms.    Review of Systems   Constitutional: Positive for weight loss (5 pounds).   Respiratory: Positive for cough, wheezing and orthopnea. Negative for sputum production.    Cardiovascular: Positive for leg swelling.       Objective:       Vitals:    05/03/17 0816   BP: 104/60   Pulse: 66   SpO2: 96%   Weight: 46.5 kg (102 lb 8.2 oz)   Height: 4' 11" (1.499 m)   96% on 3L/nc    83% on room air at rest    Physical Exam   Constitutional: She is oriented to person, place, and time. She appears well-developed and well-nourished.   Cardiovascular: Normal rate and regular rhythm.    Pulmonary/Chest: She has decreased breath sounds (at bases bilaterally).   Musculoskeletal: She exhibits edema (1 plus edema, no calf tenderness).   Lymphadenopathy: No supraclavicular adenopathy is present.     She has no cervical adenopathy.   Neurological: She is alert and oriented to person, place, and time.   Psychiatric: She has a normal mood and affect.     Personal Diagnostic Review  Chest x-ray: INcreasing bilateral pleural effusions with increased interstitial markings  No flowsheet data found.      Assessment:       1. Acute respiratory failure with hypoxia    2. Pleural effusion, right    3. Pancreatic cancer metastasized to lung        Outpatient Encounter Prescriptions as of 5/3/2017   Medication Sig Dispense Refill    ascorbic acid, vitamin C, (VITAMIN C) 500 MG tablet Take 500 mg by mouth 2 (two) times daily.      aspirin (ECOTRIN) 81 MG EC tablet Take 81 mg by mouth once daily.      atorvastatin (LIPITOR) 40 MG tablet Take 1 tablet (40 mg total) by mouth once daily. 30 tablet 2    BACILLUS " "COAGULANS (PROBIOTIC, B. COAGULANS, ORAL) Take by mouth.      BD ULTRA-FINE EDWIN PEN NEEDLES 32 x 5/32 " Ndle Uses 5 times daily, on multiple daily insulin injections 150 each 12    clopidogrel (PLAVIX) 75 mg tablet Take 1 tablet (75 mg total) by mouth once daily. 30 tablet 11    furosemide (LASIX) 40 MG tablet TAKE 1 TABLET (40 MG TOTAL) BY MOUTH 2 (TWO) TIMES DAILY. 60 tablet 1    gabapentin (NEURONTIN) 300 MG capsule Take 1 capsule (300 mg total) by mouth 3 (three) times daily. 90 capsule 2    hydrocodone-acetaminophen 5-325mg (NORCO) 5-325 mg per tablet Take 1 tablet by mouth every 6 (six) hours as needed for Pain. 120 tablet 0    insulin aspart (NOVOLOG FLEXPEN) 100 unit/mL InPn pen Inject 6 Units into the skin 3 (three) times daily with meals. With correction scale for 33 units max TDD (Patient taking differently: Inject 5 Units into the skin 3 (three) times daily with meals. With correction scale for 33 units max TDD) 1 Box 6    insulin glargine, TOUJEO, (TOUJEO SOLOSTAR) 300 unit/mL (1.5 mL) InPn pen Inject 4 Units into the skin 2 (two) times daily. 1 Syringe 12    KRILL OIL ORAL Take by mouth once daily.      levothyroxine (SYNTHROID) 100 MCG tablet Take 1 tablet (100 mcg total) by mouth once daily. 30 tablet 11    loratadine (CLARITIN) 10 mg tablet Take 10 mg by mouth once daily.      methadone (DOLOPHINE) 5 MG tablet Take 1 tablet (5 mg total) by mouth every 12 (twelve) hours. 60 tablet 0    metoclopramide HCl (REGLAN) 10 MG tablet Take 1 tablet (10 mg total) by mouth 4 (four) times daily before meals and nightly. 90 tablet 1    metoprolol tartrate (LOPRESSOR) 25 MG tablet Take 0.5 tablets (12.5 mg total) by mouth 2 (two) times daily. 30 tablet 2    morphine (MS CONTIN) 15 MG 12 hr tablet Take 2 tablets (30 mg total) by mouth every 8 (eight) hours. Dosing increase, needs refill early 90 tablet 0    multivitamin capsule Take 1 capsule by mouth once daily.      ondansetron (ZOFRAN-ODT) 8 MG " "TbDL Take 1 tablet (8 mg total) by mouth every 6 (six) hours as needed. 100 tablet 1    vitamin D 1000 units Tab Take 185 mg by mouth once daily.      diazePAM (VALIUM) 5 MG tablet Take 1 tablet (5 mg total) by mouth every 12 (twelve) hours as needed (muscle spasms). 30 tablet 1    erlotinib (TARCEVA) 100 MG tablet Take 1 tablet (100 mg total) by mouth once daily. 30 tablet 1    ipratropium (ATROVENT) 0.02 % nebulizer solution Take 2.5 mLs (500 mcg total) by nebulization 4 (four) times daily. Rescue 30 vial 1    lipase-protease-amylase 36,000-114,000- 180,000 unit CpDR Take 3 capsules by mouth 3 (three) times daily with meals. 270 capsule 5    nitroGLYCERIN (NITROSTAT) 0.4 MG SL tablet Place 1 tablet (0.4 mg total) under the tongue every 5 (five) minutes as needed for Chest pain. 30 tablet 0    pantoprazole (PROTONIX) 40 MG tablet Take 1 tablet (40 mg total) by mouth once daily. 30 tablet 2    [DISCONTINUED] senna-docusate 8.6-50 mg (PERICOLACE) 8.6-50 mg per tablet Take 1 tablet by mouth daily as needed for Constipation.       No facility-administered encounter medications on file as of 5/3/2017.      Orders Placed This Encounter   Procedures    OXYGEN FOR HOME USE     Order Specific Question:   Liter Flow     Answer:   3     Order Specific Question:   Duration     Answer:   Continuous     Order Specific Question:   Qualifying SpO2:     Answer:   83     Order Specific Question:   Testing done at:     Answer:   Rest     Order Specific Question:   Route     Answer:   nasal cannula     Order Specific Question:   Portable mode:     Answer:   pulse dose acceptable     Order Specific Question:   Device     Answer:   home concentrator with portable unit     Comments:   needs portable oxygen concentrator     Order Specific Question:   Length of need (in months):     Answer:   99 mos     Order Specific Question:   Height:     Answer:   4' 11" (1.499 m)     Order Specific Question:   Weight:     Answer:   46.5 kg " (102 lb 8.2 oz)     Order Specific Question:   Alternative treatment measures have been tried or considered and deemed clinically ineffective.     Answer:   Yes    CTA Chest Non Coronary     Standing Status:   Future     Standing Expiration Date:   5/3/2018     Order Specific Question:   Is the patient allergic to iodine or contrast? Has a steroid / antihistamine prep been administered?     Answer:   No     Order Specific Question:   Is the patient on ANY Metformin drug such as Glugophage/Glucovance?           Should be off drug 48 hours after contrast. Check renal function before restart.     Answer:   No     Order Specific Question:   Age > 60 years?     Answer:   No     Order Specific Question:   History of Kidney Disease - including: decreased kidney function, dialysis, kidney transplay, single kidney, kidney cancer, kidney surgery?     Answer:   None     Order Specific Question:   Does the patient have high blood pressure requiring medical treatment?     Answer:   No     Order Specific Question:   Diabetes?     Answer:   Yes     Order Specific Question:   May the Radiologist modify the order per protocol to meet the clinical needs of the patient?     Answer:   Yes     Order Specific Question:   Recist criteria?     Answer:   No     Plan:       Patient would benefit from portable oxygen concentrator as she wants to travel and will by flying.  Diagnostic and therapeutic thoracentesis  I have explained the risks, benefits and alternatives of the procedure in detail.  The patient voices understanding and all questions have been answered.  The patient agrees to proceed as planned.    Written consent was signed prior to beginning procedure.  The increased risk of bleeding while on plavix was discussed (had a recent MI).  CTA to rule out PE as patient recently had a long car drive and it seems that this is when symptoms began      [facility]  Thoracentesis  Procedure Note    SUMMARY     Date of  Procedure: 05/03/2017     Procedure: Thoracentesis    Indications: Therapeutic    Pre-Operative Diagnosis: Metastatic pancreatic cancer    Post-Operative Diagnosis: same    Anesthesia: local    Technical Procedures Used: [place]ola    Description of the Findings of the Procedure: 1200cc of clear yellow fluid    Consent: Informed consent was obtained. Risks of the procedure were discussed including: infection, bleeding, pain, pneumothorax.    Under sterile conditions the patient was positioned. Chlorhexadine solution and sterile drapes were utilized. 4 cc of 2% with epi lidocaine and1 cc 1% plain lidocaine was used to anesthetize between the rib space after localized under ultrasound. Fluid was obtained after catheter inserted without  difficulty and suction applied with minimal blood loss.  A dressing was applied to the wound and wound care instructions were provided.     1200 ml of clear pleural fluid was obtained. A sample was sent to Pathology for cytogenetics, flow, and cell counts, as well as for infection analysis.    Plan:    A follow up chest x-ray was ordered. No  Tylenol 650 mg. for pain.    Significant Surgical Tasks Conducted by the Assistant(s), if Applicable:    Complications: None; patient tolerated the procedure well.    Estimated Blood Loss (EBL): None    Attestation: I performed the procedure.    Patient will return next Friday for a left sided thoracentesis and will evaluate if rapidly reaccumulating and would benefit from PleurX catheter.

## 2017-05-04 ENCOUNTER — PATIENT MESSAGE (OUTPATIENT)
Dept: DIABETES | Facility: CLINIC | Age: 57
End: 2017-05-04

## 2017-05-04 DIAGNOSIS — E55.9 VITAMIN D DEFICIENCY: Primary | ICD-10-CM

## 2017-05-04 DIAGNOSIS — E03.9 ACQUIRED HYPOTHYROIDISM: Chronic | ICD-10-CM

## 2017-05-04 DIAGNOSIS — D64.9 ANEMIA, UNSPECIFIED TYPE: ICD-10-CM

## 2017-05-05 PROBLEM — D64.9 ANEMIA: Status: ACTIVE | Noted: 2017-05-05

## 2017-05-07 ENCOUNTER — HOSPITAL ENCOUNTER (INPATIENT)
Facility: HOSPITAL | Age: 57
LOS: 4 days | Discharge: HOME | DRG: 435 | End: 2017-05-11
Attending: EMERGENCY MEDICINE | Admitting: INTERNAL MEDICINE
Payer: COMMERCIAL

## 2017-05-07 DIAGNOSIS — J43.2 CENTRILOBULAR EMPHYSEMA: ICD-10-CM

## 2017-05-07 DIAGNOSIS — J90 PLEURAL EFFUSION: ICD-10-CM

## 2017-05-07 DIAGNOSIS — R91.1 LUNG NODULE: Chronic | ICD-10-CM

## 2017-05-07 DIAGNOSIS — C50.011 MALIGNANT NEOPLASM OF AREOLA OF RIGHT BREAST IN FEMALE: Chronic | ICD-10-CM

## 2017-05-07 DIAGNOSIS — Z87.891 FORMER SMOKER: Chronic | ICD-10-CM

## 2017-05-07 DIAGNOSIS — Z95.5 HISTORY OF CORONARY ARTERY STENT PLACEMENT: Chronic | ICD-10-CM

## 2017-05-07 DIAGNOSIS — D73.89 NODULE OF SPLEEN: ICD-10-CM

## 2017-05-07 DIAGNOSIS — E55.9 VITAMIN D DEFICIENCY: ICD-10-CM

## 2017-05-07 DIAGNOSIS — S22.000A THORACIC COMPRESSION FRACTURE, CLOSED, INITIAL ENCOUNTER: ICD-10-CM

## 2017-05-07 DIAGNOSIS — J93.9 PNEUMOTHORAX: ICD-10-CM

## 2017-05-07 DIAGNOSIS — K21.00 GASTROESOPHAGEAL REFLUX DISEASE WITH ESOPHAGITIS: Chronic | ICD-10-CM

## 2017-05-07 DIAGNOSIS — R06.02 SHORTNESS OF BREATH: ICD-10-CM

## 2017-05-07 DIAGNOSIS — E11.9 TYPE 2 DIABETES MELLITUS WITHOUT COMPLICATION, WITHOUT LONG-TERM CURRENT USE OF INSULIN: Chronic | ICD-10-CM

## 2017-05-07 DIAGNOSIS — R07.89 ANTERIOR CHEST WALL PAIN: ICD-10-CM

## 2017-05-07 DIAGNOSIS — I25.10 CORONARY ARTERY DISEASE INVOLVING NATIVE CORONARY ARTERY OF NATIVE HEART WITHOUT ANGINA PECTORIS: Chronic | ICD-10-CM

## 2017-05-07 DIAGNOSIS — J93.9 PNEUMOTHORAX, UNSPECIFIED TYPE: Primary | ICD-10-CM

## 2017-05-07 DIAGNOSIS — C25.9 PANCREATIC ADENOCARCINOMA: Chronic | ICD-10-CM

## 2017-05-07 DIAGNOSIS — M51.24 HERNIATED THORACIC DISC WITHOUT MYELOPATHY: ICD-10-CM

## 2017-05-07 DIAGNOSIS — E03.9 ACQUIRED HYPOTHYROIDISM: Chronic | ICD-10-CM

## 2017-05-07 DIAGNOSIS — C79.51 BONE METASTASES: Chronic | ICD-10-CM

## 2017-05-07 DIAGNOSIS — C78.7 LIVER METASTASES: ICD-10-CM

## 2017-05-07 DIAGNOSIS — E27.8 ADRENAL NODULE: Chronic | ICD-10-CM

## 2017-05-07 DIAGNOSIS — Z87.19 HISTORY OF PANCREATITIS: ICD-10-CM

## 2017-05-07 DIAGNOSIS — K57.30 DIVERTICULOSIS OF LARGE INTESTINE WITHOUT HEMORRHAGE: ICD-10-CM

## 2017-05-07 PROBLEM — R07.9 CHEST PAIN: Status: RESOLVED | Noted: 2017-03-08 | Resolved: 2017-05-07

## 2017-05-07 PROBLEM — T45.1X5A CHEMOTHERAPY INDUCED NEUTROPENIA: Status: RESOLVED | Noted: 2017-01-30 | Resolved: 2017-05-07

## 2017-05-07 PROBLEM — D70.1 CHEMOTHERAPY INDUCED NEUTROPENIA: Status: RESOLVED | Noted: 2017-01-30 | Resolved: 2017-05-07

## 2017-05-07 PROBLEM — D64.9 ANEMIA: Chronic | Status: ACTIVE | Noted: 2017-05-05

## 2017-05-07 LAB
ALBUMIN SERPL BCP-MCNC: 2.9 G/DL
ALP SERPL-CCNC: 119 U/L
ALT SERPL W/O P-5'-P-CCNC: 14 U/L
ANION GAP SERPL CALC-SCNC: 12 MMOL/L
AST SERPL-CCNC: 19 U/L
BASOPHILS # BLD AUTO: 0.03 K/UL
BASOPHILS NFR BLD: 0.5 %
BILIRUB SERPL-MCNC: 0.4 MG/DL
BNP SERPL-MCNC: 51 PG/ML
BUN SERPL-MCNC: 16 MG/DL
CALCIUM SERPL-MCNC: 8.9 MG/DL
CHLORIDE SERPL-SCNC: 106 MMOL/L
CO2 SERPL-SCNC: 23 MMOL/L
CREAT SERPL-MCNC: 0.7 MG/DL
DIFFERENTIAL METHOD: ABNORMAL
EOSINOPHIL # BLD AUTO: 0.1 K/UL
EOSINOPHIL NFR BLD: 2 %
ERYTHROCYTE [DISTWIDTH] IN BLOOD BY AUTOMATED COUNT: 13.8 %
EST. GFR  (AFRICAN AMERICAN): >60 ML/MIN/1.73 M^2
EST. GFR  (NON AFRICAN AMERICAN): >60 ML/MIN/1.73 M^2
GLUCOSE SERPL-MCNC: 119 MG/DL
HCT VFR BLD AUTO: 35.6 %
HGB BLD-MCNC: 12 G/DL
INR PPP: 1
LYMPHOCYTES # BLD AUTO: 0.4 K/UL
LYMPHOCYTES NFR BLD: 6.6 %
MAGNESIUM SERPL-MCNC: 1.6 MG/DL
MCH RBC QN AUTO: 27.6 PG
MCHC RBC AUTO-ENTMCNC: 33.7 %
MCV RBC AUTO: 82 FL
MONOCYTES # BLD AUTO: 0.5 K/UL
MONOCYTES NFR BLD: 8 %
NEUTROPHILS # BLD AUTO: 5.4 K/UL
NEUTROPHILS NFR BLD: 82.6 %
PLATELET # BLD AUTO: 189 K/UL
PMV BLD AUTO: 9.2 FL
POCT GLUCOSE: 169 MG/DL (ref 70–110)
POTASSIUM SERPL-SCNC: 3.6 MMOL/L
PROT SERPL-MCNC: 6.6 G/DL
PROTHROMBIN TIME: 10.4 SEC
RBC # BLD AUTO: 4.34 M/UL
SODIUM SERPL-SCNC: 141 MMOL/L
TROPONIN I SERPL DL<=0.01 NG/ML-MCNC: 0.31 NG/ML
WBC # BLD AUTO: 6.5 K/UL

## 2017-05-07 PROCEDURE — 36000 PLACE NEEDLE IN VEIN: CPT

## 2017-05-07 PROCEDURE — 20600001 HC STEP DOWN PRIVATE ROOM

## 2017-05-07 PROCEDURE — 85610 PROTHROMBIN TIME: CPT

## 2017-05-07 PROCEDURE — 94760 N-INVAS EAR/PLS OXIMETRY 1: CPT

## 2017-05-07 PROCEDURE — 84484 ASSAY OF TROPONIN QUANT: CPT

## 2017-05-07 PROCEDURE — 25000003 PHARM REV CODE 250: Performed by: INTERNAL MEDICINE

## 2017-05-07 PROCEDURE — 94640 AIRWAY INHALATION TREATMENT: CPT

## 2017-05-07 PROCEDURE — 94761 N-INVAS EAR/PLS OXIMETRY MLT: CPT

## 2017-05-07 PROCEDURE — 99223 1ST HOSP IP/OBS HIGH 75: CPT | Mod: ,,, | Performed by: INTERNAL MEDICINE

## 2017-05-07 PROCEDURE — 83735 ASSAY OF MAGNESIUM: CPT

## 2017-05-07 PROCEDURE — 63600175 PHARM REV CODE 636 W HCPCS: Performed by: INTERNAL MEDICINE

## 2017-05-07 PROCEDURE — 85025 COMPLETE CBC W/AUTO DIFF WBC: CPT

## 2017-05-07 PROCEDURE — 99291 CRITICAL CARE FIRST HOUR: CPT | Mod: ,,, | Performed by: EMERGENCY MEDICINE

## 2017-05-07 PROCEDURE — 83880 ASSAY OF NATRIURETIC PEPTIDE: CPT

## 2017-05-07 PROCEDURE — 80053 COMPREHEN METABOLIC PANEL: CPT

## 2017-05-07 PROCEDURE — 27000221 HC OXYGEN, UP TO 24 HOURS

## 2017-05-07 PROCEDURE — 25000242 PHARM REV CODE 250 ALT 637 W/ HCPCS: Performed by: EMERGENCY MEDICINE

## 2017-05-07 PROCEDURE — 99285 EMERGENCY DEPT VISIT HI MDM: CPT | Mod: 25

## 2017-05-07 RX ORDER — METHADONE HYDROCHLORIDE 5 MG/1
5 TABLET ORAL EVERY 12 HOURS
Status: DISCONTINUED | OUTPATIENT
Start: 2017-05-07 | End: 2017-05-09

## 2017-05-07 RX ORDER — MORPHINE SULFATE 15 MG/1
30 TABLET, FILM COATED, EXTENDED RELEASE ORAL EVERY 8 HOURS
Status: DISCONTINUED | OUTPATIENT
Start: 2017-05-07 | End: 2017-05-08

## 2017-05-07 RX ORDER — INSULIN ASPART 100 [IU]/ML
3 INJECTION, SOLUTION INTRAVENOUS; SUBCUTANEOUS
Status: DISCONTINUED | OUTPATIENT
Start: 2017-05-07 | End: 2017-05-11 | Stop reason: HOSPADM

## 2017-05-07 RX ORDER — ONDANSETRON 8 MG/1
8 TABLET, ORALLY DISINTEGRATING ORAL EVERY 8 HOURS PRN
Status: DISCONTINUED | OUTPATIENT
Start: 2017-05-07 | End: 2017-05-11 | Stop reason: HOSPADM

## 2017-05-07 RX ORDER — METOPROLOL TARTRATE 25 MG/1
12.5 TABLET ORAL 2 TIMES DAILY
Status: DISCONTINUED | OUTPATIENT
Start: 2017-05-07 | End: 2017-05-11 | Stop reason: HOSPADM

## 2017-05-07 RX ORDER — IPRATROPIUM BROMIDE AND ALBUTEROL SULFATE 2.5; .5 MG/3ML; MG/3ML
3 SOLUTION RESPIRATORY (INHALATION) EVERY 4 HOURS PRN
Status: DISCONTINUED | OUTPATIENT
Start: 2017-05-07 | End: 2017-05-11 | Stop reason: HOSPADM

## 2017-05-07 RX ORDER — LEVOTHYROXINE SODIUM 100 UG/1
100 TABLET ORAL DAILY
Status: DISCONTINUED | OUTPATIENT
Start: 2017-05-08 | End: 2017-05-11 | Stop reason: HOSPADM

## 2017-05-07 RX ORDER — GABAPENTIN 300 MG/1
300 CAPSULE ORAL 3 TIMES DAILY
Status: DISCONTINUED | OUTPATIENT
Start: 2017-05-07 | End: 2017-05-11 | Stop reason: HOSPADM

## 2017-05-07 RX ORDER — INSULIN ASPART 100 [IU]/ML
0-5 INJECTION, SOLUTION INTRAVENOUS; SUBCUTANEOUS
Status: DISCONTINUED | OUTPATIENT
Start: 2017-05-07 | End: 2017-05-11 | Stop reason: HOSPADM

## 2017-05-07 RX ORDER — LIDOCAINE HYDROCHLORIDE AND EPINEPHRINE 10; 10 MG/ML; UG/ML
1 INJECTION, SOLUTION INFILTRATION; PERINEURAL ONCE
Status: DISCONTINUED | OUTPATIENT
Start: 2017-05-07 | End: 2017-05-10

## 2017-05-07 RX ORDER — CHOLECALCIFEROL (VITAMIN D3) 25 MCG
1000 TABLET ORAL DAILY
Status: DISCONTINUED | OUTPATIENT
Start: 2017-05-08 | End: 2017-05-11 | Stop reason: HOSPADM

## 2017-05-07 RX ORDER — PANTOPRAZOLE SODIUM 40 MG/1
40 TABLET, DELAYED RELEASE ORAL DAILY
Status: DISCONTINUED | OUTPATIENT
Start: 2017-05-08 | End: 2017-05-11 | Stop reason: HOSPADM

## 2017-05-07 RX ORDER — ACETAMINOPHEN 325 MG/1
650 TABLET ORAL EVERY 8 HOURS PRN
Status: DISCONTINUED | OUTPATIENT
Start: 2017-05-07 | End: 2017-05-09

## 2017-05-07 RX ORDER — ENOXAPARIN SODIUM 100 MG/ML
40 INJECTION SUBCUTANEOUS EVERY 24 HOURS
Status: DISCONTINUED | OUTPATIENT
Start: 2017-05-08 | End: 2017-05-11 | Stop reason: HOSPADM

## 2017-05-07 RX ORDER — IPRATROPIUM BROMIDE AND ALBUTEROL SULFATE 2.5; .5 MG/3ML; MG/3ML
3 SOLUTION RESPIRATORY (INHALATION)
Status: COMPLETED | OUTPATIENT
Start: 2017-05-07 | End: 2017-05-07

## 2017-05-07 RX ORDER — HYDROCODONE BITARTRATE AND ACETAMINOPHEN 10; 325 MG/1; MG/1
1 TABLET ORAL EVERY 6 HOURS PRN
Status: DISCONTINUED | OUTPATIENT
Start: 2017-05-07 | End: 2017-05-08

## 2017-05-07 RX ORDER — MORPHINE SULFATE 2 MG/ML
4 INJECTION, SOLUTION INTRAMUSCULAR; INTRAVENOUS EVERY 4 HOURS PRN
Status: DISCONTINUED | OUTPATIENT
Start: 2017-05-07 | End: 2017-05-09

## 2017-05-07 RX ORDER — ASPIRIN 81 MG/1
81 TABLET ORAL DAILY
Status: DISCONTINUED | OUTPATIENT
Start: 2017-05-08 | End: 2017-05-11 | Stop reason: HOSPADM

## 2017-05-07 RX ORDER — GLUCAGON 1 MG
1 KIT INJECTION
Status: DISCONTINUED | OUTPATIENT
Start: 2017-05-07 | End: 2017-05-11 | Stop reason: HOSPADM

## 2017-05-07 RX ORDER — ATORVASTATIN CALCIUM 20 MG/1
40 TABLET, FILM COATED ORAL DAILY
Status: DISCONTINUED | OUTPATIENT
Start: 2017-05-08 | End: 2017-05-11 | Stop reason: HOSPADM

## 2017-05-07 RX ORDER — CLOPIDOGREL BISULFATE 75 MG/1
75 TABLET ORAL DAILY
Status: DISCONTINUED | OUTPATIENT
Start: 2017-05-08 | End: 2017-05-08

## 2017-05-07 RX ORDER — IBUPROFEN 200 MG
16 TABLET ORAL
Status: DISCONTINUED | OUTPATIENT
Start: 2017-05-07 | End: 2017-05-11 | Stop reason: HOSPADM

## 2017-05-07 RX ORDER — ONDANSETRON 2 MG/ML
4 INJECTION INTRAMUSCULAR; INTRAVENOUS EVERY 8 HOURS PRN
Status: DISCONTINUED | OUTPATIENT
Start: 2017-05-07 | End: 2017-05-09

## 2017-05-07 RX ORDER — IBUPROFEN 200 MG
24 TABLET ORAL
Status: DISCONTINUED | OUTPATIENT
Start: 2017-05-07 | End: 2017-05-11 | Stop reason: HOSPADM

## 2017-05-07 RX ADMIN — MORPHINE SULFATE 30 MG: 15 TABLET, EXTENDED RELEASE ORAL at 09:05

## 2017-05-07 RX ADMIN — INSULIN DETEMIR 2 UNITS: 100 INJECTION, SOLUTION SUBCUTANEOUS at 09:05

## 2017-05-07 RX ADMIN — GABAPENTIN 300 MG: 300 CAPSULE ORAL at 09:05

## 2017-05-07 RX ADMIN — INSULIN ASPART 3 UNITS: 100 INJECTION, SOLUTION INTRAVENOUS; SUBCUTANEOUS at 09:05

## 2017-05-07 RX ADMIN — IPRATROPIUM BROMIDE AND ALBUTEROL SULFATE 3 ML: .5; 3 SOLUTION RESPIRATORY (INHALATION) at 07:05

## 2017-05-07 RX ADMIN — Medication 12.5 MG: at 09:05

## 2017-05-07 RX ADMIN — METHADONE HYDROCHLORIDE 5 MG: 5 TABLET ORAL at 09:05

## 2017-05-07 NOTE — ED TRIAGE NOTES
Pt has pancreatic cancer with mets to the lungs. Pt complains of SOB and chest pain that radiates to the jaw x 2 hours. Pt's family states that pt was on the toilet and her heart rate dropped into the 30's. When EMS arrived HR was in the 70's.

## 2017-05-07 NOTE — IP AVS SNAPSHOT
[facility]  [address]  [facility] [address]  Phone: [phone]           Patient Discharge Instructions   Our goal is to set you up for success. This packet includes information on your condition, medications, and your home care.  It will help you care for yourself to prevent having to return to the hospital.     Please ask your nurse if you have any questions.      There are many details to remember when preparing to leave the hospital. Here is what you will need to do:    1. Take your medicine. If you are prescribed medications, review your Medication List on the following pages. You may have new medications to  at the pharmacy and others that you'll need to stop taking. Review the instructions for how and when to take your medications. Talk with your doctor or nurses if you are unsure of what to do.     2. Go to your follow-up appointments. Specific follow-up information is listed in the following pages. Your may be contacted by a nurse or clinical provider about future appointments. Be sure we have all of the phone numbers to reach you. Please contact your provider's office if you are unable to make an appointment.     3. Watch for warning signs. Your doctor or nurse will give you detailed warning signs to watch for and when to call for assistance. These instructions may also include educational information about your condition. If you experience any of warning signs to your health, call your doctor.               ** Verify the list of medication(s) below is accurate and up to date. Carry this with you in case of emergency. If your medications have changed, please notify your healthcare provider.             Medication List      START taking these medications        Additional Info    Begin Date AM Noon PM Bedtime    albuterol 90 mcg/actuation inhaler   Quantity:  1 Inhaler   Refills:  1   Dose:  1-2 puff    Instructions:  Inhale 1-2 puffs into the lungs every 6  (six) hours as needed for Wheezing or Shortness of Breath. Rescue                            hydrocodone-acetaminophen 10-325mg  mg Tab   Commonly known as:  NORCO   Quantity:  60 tablet   Refills:  0   Dose:  1 tablet   Replaces:  hydrocodone-acetaminophen 5-325mg 5-325 mg per tablet    Last time this was given:  1 tablet on 5/11/2017  3:01 PM   Instructions:  Take 1 tablet by mouth every 4 (four) hours as needed.                            HYDROmorphone 4 MG tablet   Commonly known as:  DILAUDID   Quantity:  60 tablet   Refills:  0   Dose:  4 mg    Last time this was given:  4 mg on 5/11/2017 11:48 AM   Instructions:  Take 1 tablet (4 mg total) by mouth every 3 (three) hours as needed.                            predniSONE 20 MG tablet   Commonly known as:  DELTASONE   Quantity:  60 tablet   Refills:  0   Dose:  40 mg    Last time this was given:  40 mg on 5/11/2017  9:40 AM   Instructions:  Take 2 tablets (40 mg total) by mouth once daily.     05/12                              CHANGE how you take these medications        Additional Info    Begin Date AM Noon PM Bedtime    insulin aspart 100 unit/mL Inpn pen   Commonly known as:  NOVOLOG FLEXPEN   Quantity:  1 Box   Refills:  6   Dose:  6 Units   What changed:    - how much to take  - additional instructions    Last time this was given:  2 Units on 5/11/2017 12:36 PM   Instructions:  Inject 6 Units into the skin 3 (three) times daily with meals. With correction scale for 33 units max TDD                            methadone 5 MG tablet   Commonly known as:  DOLOPHINE   Quantity:  90 tablet   Refills:  0   Dose:  5 mg   What changed:  when to take this    Last time this was given:  5 mg on 5/11/2017  1:43 PM   Instructions:  Take 1 tablet (5 mg total) by mouth every 8 (eight) hours.     Last taken 5/11 2:00                         CONTINUE taking these medications        Additional Info    Begin Date AM Noon PM Bedtime    aspirin 81 MG EC tablet   Commonly  "known as:  ECOTRIN   Refills:  0   Dose:  81 mg    Last time this was given:  81 mg on 5/11/2017  9:55 AM   Instructions:  Take 81 mg by mouth once daily.                            atorvastatin 40 MG tablet   Commonly known as:  LIPITOR   Quantity:  30 tablet   Refills:  2   Dose:  40 mg    Last time this was given:  40 mg on 5/9/2017  9:49 PM   Instructions:  Take 1 tablet (40 mg total) by mouth once daily.                            BD ULTRA-FINE EDWIN PEN NEEDLES 32 gauge x 5/32" Ndle   Quantity:  150 each   Refills:  12   Generic drug:  pen needle, diabetic    Instructions:  Uses 5 times daily, on multiple daily insulin injections                            diazePAM 5 MG tablet   Commonly known as:  VALIUM   Quantity:  30 tablet   Refills:  1   Dose:  5 mg    Instructions:  Take 1 tablet (5 mg total) by mouth every 12 (twelve) hours as needed (muscle spasms).                            gabapentin 300 MG capsule   Commonly known as:  NEURONTIN   Quantity:  90 capsule   Refills:  2   Dose:  300 mg    Last time this was given:  300 mg on 5/11/2017  1:43 PM   Instructions:  Take 1 capsule (300 mg total) by mouth 3 (three) times daily.                            insulin glargine (TOUJEO) 300 unit/mL (1.5 mL) Inpn pen   Commonly known as:  TOUJEO SOLOSTAR   Quantity:  1 Syringe   Refills:  12   Dose:  4 Units    Instructions:  Inject 4 Units into the skin 2 (two) times daily.                            ipratropium 0.02 % nebulizer solution   Commonly known as:  ATROVENT   Quantity:  30 vial   Refills:  1   Dose:  500 mcg    Instructions:  Take 2.5 mLs (500 mcg total) by nebulization 4 (four) times daily. Rescue                            levothyroxine 100 MCG tablet   Commonly known as:  SYNTHROID   Quantity:  30 tablet   Refills:  11   Dose:  100 mcg    Last time this was given:  100 mcg on 5/11/2017  5:41 AM   Instructions:  Take 1 tablet (100 mcg total) by mouth once daily.                            " lipase-protease-amylase 36,000-114,000- 180,000 unit Cpdr   Quantity:  270 capsule   Refills:  5   Dose:  3 capsule   Indications:  Pancreatic Insufficiency    Instructions:  Take 3 capsules by mouth 3 (three) times daily with meals.                            loratadine 10 mg tablet   Commonly known as:  CLARITIN   Refills:  0   Dose:  10 mg    Instructions:  Take 10 mg by mouth once daily.                            metoclopramide HCl 10 MG tablet   Commonly known as:  REGLAN   Quantity:  90 tablet   Refills:  1   Dose:  10 mg    Instructions:  Take 1 tablet (10 mg total) by mouth 4 (four) times daily before meals and nightly.                            metoprolol tartrate 25 MG tablet   Commonly known as:  LOPRESSOR   Quantity:  30 tablet   Refills:  2   Dose:  12.5 mg    Last time this was given:  12.5 mg on 5/11/2017  9:40 AM   Instructions:  Take 0.5 tablets (12.5 mg total) by mouth 2 (two) times daily.                            multivitamin capsule   Refills:  0   Dose:  1 capsule    Instructions:  Take 1 capsule by mouth once daily.                            nitroGLYCERIN 0.4 MG SL tablet   Commonly known as:  NITROSTAT   Quantity:  30 tablet   Refills:  0   Dose:  0.4 mg    Instructions:  Place 1 tablet (0.4 mg total) under the tongue every 5 (five) minutes as needed for Chest pain.                            ondansetron 8 MG Tbdl   Commonly known as:  ZOFRAN-ODT   Quantity:  100 tablet   Refills:  1   Dose:  8 mg    Instructions:  Take 1 tablet (8 mg total) by mouth every 6 (six) hours as needed.                            pantoprazole 40 MG tablet   Commonly known as:  PROTONIX   Quantity:  30 tablet   Refills:  2   Dose:  40 mg    Last time this was given:  40 mg on 5/11/2017  9:40 AM   Instructions:  Take 1 tablet (40 mg total) by mouth once daily.                            VITAMIN C 500 MG tablet   Refills:  0   Dose:  500 mg   Generic drug:  ascorbic acid (vitamin C)    Instructions:  Take 500 mg  by mouth 2 (two) times daily.                            vitamin D 1000 units Tab   Refills:  0   Dose:  185 mg    Last time this was given:  1,000 Units on 5/11/2017  9:40 AM   Instructions:  Take 185 mg by mouth once daily.                              STOP taking these medications     clopidogrel 75 mg tablet   Commonly known as:  PLAVIX       furosemide 40 MG tablet   Commonly known as:  LASIX       hydrocodone-acetaminophen 5-325mg 5-325 mg per tablet   Commonly known as:  NORCO   Replaced by:  hydrocodone-acetaminophen 10-325mg  mg Tab       morphine 15 MG 12 hr tablet   Commonly known as:  MS CONTIN            Where to Get Your Medications      These medications were sent to [facility] - [place], [place] - [address] [address]     Phone:  [phone]     albuterol 90 mcg/actuation inhaler    hydrocodone-acetaminophen 10-325mg  mg Tab    HYDROmorphone 4 MG tablet    methadone 5 MG tablet    predniSONE 20 MG tablet                  Please bring to all follow up appointments:    1. A copy of your discharge instructions.  2. All medicines you are currently taking in their original bottles.  3. Identification and insurance card.    Please arrive 15 minutes ahead of scheduled appointment time.    Please call 24 hours in advance if you must reschedule your appointment and/or time.        Your Scheduled Appointments     May 12, 2017  1:30 PM CDT   Consult with Kavita Crump MD   [facility] ([facility])    [address]  [facility] [id number] [phone]            May 23, 2017 10:40 AM CDT   Established Patient Visit with MD Valentín Pizano - Cardiology ([facility] )    [address]  [place] [address] [phone]              Your Future Surgeries/Procedures     May 12, 2017   Surgery with [facility] Diagnostic Provider   [facility])    [address] [facility] [address] [phone]              Follow-up Information     Follow up with Wendi Nguyễn MD.    Specialties:  Hematology and Oncology, Hematology    Why:  as scheduled by clinic    Contact information:    [address] [facility] [id number] [phone]          Discharge Instructions     Future Orders    Call MD for:  difficulty breathing or increased cough     Call MD for:  increased confusion or weakness     Call MD for:  persistent dizziness, light-headedness, or visual disturbances     Call MD for:  persistent nausea and vomiting or diarrhea     Call MD for:  redness, tenderness, or signs of infection (pain, swelling, redness, odor or green/yellow discharge around incision site)     Call MD for:  severe persistent headache     Call MD for:  severe uncontrolled pain     Call MD for:  temperature >100.4     Call MD for:  worsening rash     Diet general     Questions:    Total calories:      Fat restriction, if any:      Protein restriction, if any:      Na restriction, if any:      Fluid restriction:      Additional restrictions:          Primary Diagnosis     Your primary diagnosis was:  Collapsed Lung      Admission Information     Date & Time Provider Department CSN    5/7/2017  6:11 PM Lenora Costa MD [facility] [id number]      Care Providers     Provider Role Specialty Primary office phone    Lenora Costa MD Attending Provider Hematology and Oncology [phone]    Corbin Galvez MD Consulting Physician  Diagnostic Radiology [phone]    Lenora Costa MD Team Attending  Hematology and Oncology [phone]      Your Vitals Were     BP Pulse Temp Resp Height Weight    107/60 (BP Location: Right arm, Patient Position: Sitting, BP Method: Automatic) 60 97.6 °F (36.4 °C) (Oral) 16 4' 11" (1.499 m) 44.9 kg (99 lb)    SpO2 BMI             98% 20 kg/m2         Recent Lab Values        8/1/2015 10/26/2015 3/7/2016 " 8/25/2016 10/19/2016 12/6/2016 2/23/2017 5/8/2017      8:15 AM  9:30 PM 10:29 AM  1:53 PM  9:20 AM  8:30 PM 11:00 AM  3:31 AM    A1C 7.4 (H) 7.5 (H) 6.1 5.5 5.5 5.8 5.8 5.7    Comment for A1C at  1:53 PM on 8/25/2016:  According to ADA guidelines, hemoglobin A1C <7.0% represents  optimal control in non-pregnant diabetic patients.  Different  metrics may apply to specific populations.   Standards of Medical Care in Diabetes - 2016.  For the purpose of screening for the presence of diabetes:  <5.7%     Consistent with the absence of diabetes  5.7-6.4%  Consistent with increasing risk for diabetes   (prediabetes)  >or=6.5%  Consistent with diabetes  Currently no consensus exists for use of hemoglobin A1C  for diagnosis of diabetes for children.      Comment for A1C at  9:20 AM on 10/19/2016:  According to ADA guidelines, hemoglobin A1C <7.0% represents  optimal control in non-pregnant diabetic patients.  Different  metrics may apply to specific populations.   Standards of Medical Care in Diabetes - 2016.  For the purpose of screening for the presence of diabetes:  <5.7%     Consistent with the absence of diabetes  5.7-6.4%  Consistent with increasing risk for diabetes   (prediabetes)  >or=6.5%  Consistent with diabetes  Currently no consensus exists for use of hemoglobin A1C  for diagnosis of diabetes for children.      Comment for A1C at  8:30 PM on 12/6/2016:  According to ADA guidelines, hemoglobin A1C <7.0% represents  optimal control in non-pregnant diabetic patients.  Different  metrics may apply to specific populations.   Standards of Medical Care in Diabetes - 2016.  For the purpose of screening for the presence of diabetes:  <5.7%     Consistent with the absence of diabetes  5.7-6.4%  Consistent with increasing risk for diabetes   (prediabetes)  >or=6.5%  Consistent with diabetes  Currently no consensus exists for use of hemoglobin A1C  for diagnosis of diabetes for children.      Comment for A1C at 11:00 AM  on 2/23/2017:  According to ADA guidelines, hemoglobin A1C <7.0% represents  optimal control in non-pregnant diabetic patients.  Different  metrics may apply to specific populations.   Standards of Medical Care in Diabetes - 2016.  For the purpose of screening for the presence of diabetes:  <5.7%     Consistent with the absence of diabetes  5.7-6.4%  Consistent with increasing risk for diabetes   (prediabetes)  >or=6.5%  Consistent with diabetes  Currently no consensus exists for use of hemoglobin A1C  for diagnosis of diabetes for children.      Comment for A1C at  3:31 AM on 5/8/2017:  According to ADA guidelines, hemoglobin A1C <7.0% represents  optimal control in non-pregnant diabetic patients.  Different  metrics may apply to specific populations.   Standards of Medical Care in Diabetes - 2016.  For the purpose of screening for the presence of diabetes:  <5.7%     Consistent with the absence of diabetes  5.7-6.4%  Consistent with increasing risk for diabetes   (prediabetes)  >or=6.5%  Consistent with diabetes  Currently no consensus exists for use of hemoglobin A1C  for diagnosis of diabetes for children.        Pending Labs     Order Current Status    Culture, Body Fluid - Bactec Preliminary result      Allergies as of 5/11/2017        Reactions    Penicillins Swelling    Demerol [Meperidine] Swelling    Dilaudid [Hydromorphone (Bulk)] Other (See Comments)    redness    Percocet [Oxycodone-acetaminophen] Swelling    Adhesive Tape-silicones Rash      [facility]     [facility] Nurse Care Line - 24/7 Assistance  Unless otherwise directed by your provider, please contact [facility], our nurse care line that is available for 24/7 assistance.     Registered nurses in the [facility] provide clinical advisement, health education, appointment booking, and other advisory services.  Call for this free service at [phone].        Advance Directives     An advance directive is a document  which, in the event you are no longer able to make decisions for yourself, tells your healthcare team what kind of treatment you do or do not want to receive, or who you would like to make those decisions for you.  If you do not currently have an advance directive, [facility] encourages you to create one.  For more information call:  [phone]), [phone] ([phone]),  or log on to [email or web address].        Smoking Cessation     If you would like to quit smoking:   You may be eligible for free services if you are a [place] resident and started smoking cigarettes before September 1, 1988.  Call the Smoking Cessation Trust (SCT) toll free at (427) 623-6064 or (550) 295-8285.   Call 6-512-QUIT-NOW if you do not meet the above criteria.   Contact us via email: [email or web address]   View our website for more information: [email or web address]        Language Assistance Services     ATTENTION: Language assistance services are available, free of charge. Please call [phone].      ATENCIÓN: Si habla español, tiene a mcleod disposición servicios gratuitos de asistencia lingüística. Llame al [phone].     CHÚ Ý: N?u b?n nói Ti?ng Vi?t, có các d?ch v? h? tr? ngôn ng? mi?n phí dành cho b?n. G?i s? [phone].        Diabetes Discharge Instructions                                    [facility] complies with applicable Federal civil rights laws and does not discriminate on the basis of race, color, national origin, age, disability, or sex.

## 2017-05-07 NOTE — ED PROVIDER NOTES
Encounter Date: 5/7/2017    SCRIBE #1 NOTE: I, Flaca Snider, am scribing for, and in the presence of,  Dr. Carpenter. I have scribed the entire note.       History     Chief Complaint   Patient presents with    Shortness of Breath     Pt presented to the ED c/o sob, chest pain, and diarrhea in which started today. Pt has a hx of MI and panceratic cancer/      Review of patient's allergies indicates:   Allergen Reactions    Penicillins Swelling    Demerol [meperidine] Swelling    Dilaudid [hydromorphone (bulk)] Other (See Comments)     redness    Percocet [oxycodone-acetaminophen] Swelling    Adhesive tape-silicones Rash     HPI Comments: Time patient was seen by the provider: 6:30 PM      The patient is a 57 y.o. female with hx of: DM type 2, MI (several weeks ago), HLD, GI bleed, GERD, diverticulitis, CAD, HTN, and pancreatic cancer with mets to the liver, lungs, and back that presents to the ED via EMS with a complaint of an episode of shortness of breath with associated wheezing, bradycardia (to 35 BPM), diaphoresis, and oxygen saturation to 85% while sitting on the toilet having diarrhea less than 1 hour ago. Patient reports another episode of diarrhea while en route to the ED in the ambulance, and she additionally complains of chest pain and jaw pain which began en route. Upon arrival, patient is still short of breath, wheezing, and has mild jaw pain. She does not do breathing treatments at home but is normally on oxygen at home which she began 2 weeks ago. She reports feeling like she needed more oxygen today. Patient also endorses recent hemoptysis following a right thorocentesis 4 days ago. She denies recent appetite changes or fever. Patient took morphine, Neurontin, valium, Advil, and methadone today for pain control prior to arrival.      The history is provided by a relative, the patient and the spouse.     Past Medical History:   Diagnosis Date    Breast cancer     bilateral    Broken rib      right    CAD (coronary artery disease)     Cataract     Centrilobular emphysema: per CT 2016 9/29/2016    Colon polyps     Coronary atherosclerosis of native coronary artery 4/24/2015    2 stents    Diabetes mellitus type 2 in nonobese     Diverticulosis of large intestine without hemorrhage 10/23/2015    Encounter for blood transfusion     Gastroesophageal reflux disease with esophagitis 10/23/2015    GI bleed     Hyperlipidemia     Hypertension     Myocardial infarction 2011    Myocardial infarction     Pancreas cancer     Pancreatitis     PONV (postoperative nausea and vomiting)     Type 2 diabetes mellitus with hyperglycemia 11/3/2015    Unspecified essential hypertension 4/24/2015    Vertebral fracture, pathological     l3, right side     Past Surgical History:   Procedure Laterality Date    APPENDECTOMY      BREAST RECONSTRUCTION      post mast    CARDIAC CATHETERIZATION  10/29/11    CATARACT EXTRACTION W/  INTRAOCULAR LENS IMPLANT Left 04/09/2015    Dr. Warner    CATARACT EXTRACTION W/  INTRAOCULAR LENS IMPLANT Right 05/14/15    Dr Warner    COLONOSCOPY N/A 10/19/2016    Procedure: COLONOSCOPY;  Surgeon: Alexis Tubbs MD;  Location: [facility] ([address] [place] [address]);  Service: Endoscopy;  Laterality: N/A;    CORONARY ANGIOPLASTY WITH STENT PLACEMENT  09/08/2011    x2    HYSTERECTOMY      MASTECTOMY Bilateral     bilateral    RK/AK Bilateral     TONSILLECTOMY      WHIPPLE PROCEDURE W/ LAPAROSCOPY  4/2016     Family History   Problem Relation Age of Onset    Colon cancer Father 60    Cancer Father     Crohn's disease Sister     Diabetes Sister     Cancer Sister      breast    Hypertension Mother     Macular degeneration Mother     Cancer Mother      breast    Diabetes Mother     Cancer Sister      breast    Diabetes Sister     Heart disease Brother     Diabetes Maternal Aunt     Diabetes Maternal Grandmother     Ulcerative colitis Neg Hx      Social History   Substance Use  Topics    Smoking status: Former Smoker     Packs/day: 1.50     Years: 40.00     Types: Cigarettes     Quit date: 10/1/2015    Smokeless tobacco: Never Used      Comment: currently vapes    Alcohol use No     Review of Systems   Constitutional: Positive for diaphoresis. Negative for appetite change and fever.   HENT: Negative for sore throat.    Eyes: Negative for visual disturbance.   Respiratory: Positive for shortness of breath and wheezing.         Hemoptysis: recent, after procedure    Cardiovascular: Positive for chest pain.        Positive for bradycardia   Gastrointestinal: Positive for diarrhea.   Musculoskeletal:        Positive for jaw pain   Skin: Negative for rash.   Neurological: Negative for headaches.   Psychiatric/Behavioral: Negative for confusion.       Physical Exam   Initial Vitals   BP Pulse Resp Temp SpO2   05/07/17 1809 05/07/17 1809 05/07/17 1809 05/07/17 1809 05/07/17 1809   96/63 76 18 98 °F (36.7 °C) 94 %     Physical Exam    Nursing note and vitals reviewed.  Constitutional: She appears well-developed and well-nourished. She appears distressed.   HENT:   Head: Normocephalic and atraumatic.   Mouth/Throat: Oropharynx is clear and moist.   Eyes: Conjunctivae are normal.   Neck: Normal range of motion.   Cardiovascular: Normal rate, regular rhythm and normal heart sounds.   Pulmonary/Chest: She is in respiratory distress. She has wheezes (bilaterally).   Tachypnea, retractions, accessory muscle use   Abdominal: Soft. She exhibits no distension. There is no tenderness.   Musculoskeletal: Normal range of motion.   Neurological: She is alert and oriented to person, place, and time.   Skin: Skin is warm and dry.         ED Course   Procedures  Labs Reviewed   CBC W/ AUTO DIFFERENTIAL - Abnormal; Notable for the following:        Result Value    Hematocrit 35.6 (*)     Lymph # 0.4 (*)     Gran% 82.6 (*)     Lymph% 6.6 (*)     All other components within normal limits   COMPREHENSIVE METABOLIC  PANEL - Abnormal; Notable for the following:     Glucose 119 (*)     Albumin 2.9 (*)     All other components within normal limits   TROPONIN I - Abnormal; Notable for the following:     Troponin I 0.307 (*)     All other components within normal limits   B-TYPE NATRIURETIC PEPTIDE   MAGNESIUM   PROTIME-INR   HEMOGLOBIN A1C   POCT GLUCOSE MONITORING CONTINUOUS     EKG Readings: (Independently Interpreted)   Rhythm: Normal Sinus Rhythm. Heart Rate: 80. Axis: Normal.   Nonspecific ST changes        X-Rays:   Independently Interpreted Readings:   Chest X-Ray: Left pleural effusion and right sided pneumothorax      Medical Decision Making:   History:   Old Medical Records: I decided to obtain old medical records.  Initial Assessment:   Emergent evaluation of shortness of breath. Initial concerns include worsening metastatic process, pneumonia, PE, ACS. Patient is very wheezy right now. Will start with breathing treatment. Patient was also complaining of chest pain which is no longer present. EKG without acute ischemic changes but will check labs and reassess.   Independently Interpreted Test(s):   I have ordered and independently interpreted X-rays - see prior notes.  I have ordered and independently interpreted EKG Reading(s) - see prior notes  Clinical Tests:   Lab Tests: Ordered and Reviewed  Radiological Study: Ordered and Reviewed  Medical Tests: Ordered and Reviewed  Other:   I have discussed this case with another health care provider.            Scribe Attestation:   Scribe #1: I performed the above scribed service and the documentation accurately describes the services I performed. I attest to the accuracy of the note.    Attending Attestation:         Attending Critical Care:   Critical Care Times:   Direct Patient Care (initial evaluation, reassessments, and time considering the case)................................................................25 minutes.   Additional History from reviewing old medical  records or taking additional history from the family, EMS, PCP, etc.......................5 minutes.   Ordering, Reviewing, and Interpreting Diagnostic Studies...............................................................................................................5 minutes.   Documentation..................................................................................................................................................................................5 minutes.   Consultation with other Physicians. .................................................................................................................................................5 minutes.   Consultation with the patient's family directly relating to the patient's condition, care, and DNR status (when patient unable)......5 minutes.   Other..................................................................................................................................................................................................5 minutes.   ==============================================================  · Total Critical Care Time - exclusive of procedural time: 55 minutes.  ==============================================================  Critical care reasons: Respiratory distress, pneumothorax.     Physician Attestation for Scribe:  Physician Attestation Statement for Scribe #1: I, Dr. Carpenter, reviewed documentation, as scribed by Flaca Snider in my presence, and it is both accurate and complete.         Attending ED Notes:   7:30 PM   Chest x-ray concerning for pneumothorax on the right. She also has large pleural effusion on the left. Pulmonology consulted for pigtail placement. Will discuss with oncology. I feel that the patient is stable for the floor.     7:47 PM  I have discussed with oncology, and patient will be admitted to their service. Lab work not significantly changed from prior. Elevated troponin seems to be at  baseline.     815 PM  Evaluated by pulmonary service, recommend holding CT placement at this time. She remains hemodynamically stable, respiratory status improved after neb.          ED Course     Clinical Impression:   The primary encounter diagnosis was Pneumothorax, unspecified type. Diagnoses of Shortness of breath and Pleural effusion were also pertinent to this visit.    Disposition:   Disposition: Admitted  Condition: Leonor Carpenter MD  05/07/17 2040

## 2017-05-08 LAB
ALBUMIN FLD-MCNC: 2.4 G/DL
ANION GAP SERPL CALC-SCNC: 9 MMOL/L
APPEARANCE FLD: CLEAR
BASOPHILS # BLD AUTO: 0.03 K/UL
BASOPHILS NFR BLD: 0.6 %
BODY FLD TYPE: NORMAL
BODY FLUID SOURCE, LDH: NORMAL
BUN SERPL-MCNC: 14 MG/DL
CALCIUM SERPL-MCNC: 8.8 MG/DL
CHLORIDE SERPL-SCNC: 106 MMOL/L
CO2 SERPL-SCNC: 26 MMOL/L
COLOR FLD: YELLOW
CREAT SERPL-MCNC: 0.6 MG/DL
DIFFERENTIAL METHOD: ABNORMAL
EOSINOPHIL # BLD AUTO: 0.2 K/UL
EOSINOPHIL NFR BLD: 3.9 %
EOSINOPHIL NFR FLD MANUAL: 1 %
ERYTHROCYTE [DISTWIDTH] IN BLOOD BY AUTOMATED COUNT: 13.9 %
EST. GFR  (AFRICAN AMERICAN): >60 ML/MIN/1.73 M^2
EST. GFR  (NON AFRICAN AMERICAN): >60 ML/MIN/1.73 M^2
ESTIMATED AVG GLUCOSE: 117 MG/DL
GLUCOSE FLD-MCNC: 169 MG/DL
GLUCOSE SERPL-MCNC: 65 MG/DL
HBA1C MFR BLD HPLC: 5.7 %
HCT VFR BLD AUTO: 34.7 %
HGB BLD-MCNC: 11.8 G/DL
LDH FLD L TO P-CCNC: 179 U/L
LYMPHOCYTES # BLD AUTO: 0.8 K/UL
LYMPHOCYTES NFR BLD: 15.5 %
LYMPHOCYTES NFR FLD MANUAL: 12 %
MAGNESIUM SERPL-MCNC: 1.5 MG/DL
MCH RBC QN AUTO: 27.9 PG
MCHC RBC AUTO-ENTMCNC: 34 %
MCV RBC AUTO: 82 FL
MONOCYTES # BLD AUTO: 0.5 K/UL
MONOCYTES NFR BLD: 8.9 %
MONOS+MACROS NFR FLD MANUAL: 30 %
NEUTROPHILS # BLD AUTO: 3.7 K/UL
NEUTROPHILS NFR BLD: 70.9 %
NEUTROPHILS NFR FLD MANUAL: 57 %
PHOSPHATE SERPL-MCNC: 3.9 MG/DL
PLATELET # BLD AUTO: 192 K/UL
PMV BLD AUTO: 9.6 FL
POCT GLUCOSE: 119 MG/DL (ref 70–110)
POCT GLUCOSE: 69 MG/DL (ref 70–110)
POTASSIUM SERPL-SCNC: 4 MMOL/L
PROT FLD-MCNC: 3.8 G/DL
RBC # BLD AUTO: 4.23 M/UL
SODIUM SERPL-SCNC: 141 MMOL/L
SPECIMEN SOURCE: NORMAL
WBC # BLD AUTO: 5.16 K/UL
WBC # FLD: 278 /CU MM

## 2017-05-08 PROCEDURE — 80048 BASIC METABOLIC PNL TOTAL CA: CPT

## 2017-05-08 PROCEDURE — 89051 BODY FLUID CELL COUNT: CPT

## 2017-05-08 PROCEDURE — 0W9B30Z DRAINAGE OF LEFT PLEURAL CAVITY WITH DRAINAGE DEVICE, PERCUTANEOUS APPROACH: ICD-10-PCS | Performed by: INTERNAL MEDICINE

## 2017-05-08 PROCEDURE — 20600001 HC STEP DOWN PRIVATE ROOM

## 2017-05-08 PROCEDURE — 0W9930Z DRAINAGE OF RIGHT PLEURAL CAVITY WITH DRAINAGE DEVICE, PERCUTANEOUS APPROACH: ICD-10-PCS | Performed by: STUDENT IN AN ORGANIZED HEALTH CARE EDUCATION/TRAINING PROGRAM

## 2017-05-08 PROCEDURE — 83036 HEMOGLOBIN GLYCOSYLATED A1C: CPT

## 2017-05-08 PROCEDURE — 25000003 PHARM REV CODE 250: Performed by: INTERNAL MEDICINE

## 2017-05-08 PROCEDURE — 84157 ASSAY OF PROTEIN OTHER: CPT

## 2017-05-08 PROCEDURE — 83735 ASSAY OF MAGNESIUM: CPT

## 2017-05-08 PROCEDURE — 82042 OTHER SOURCE ALBUMIN QUAN EA: CPT

## 2017-05-08 PROCEDURE — 27100171 HC OXYGEN HIGH FLOW UP TO 24 HOURS

## 2017-05-08 PROCEDURE — 25000003 PHARM REV CODE 250: Performed by: STUDENT IN AN ORGANIZED HEALTH CARE EDUCATION/TRAINING PROGRAM

## 2017-05-08 PROCEDURE — 63600175 PHARM REV CODE 636 W HCPCS: Performed by: STUDENT IN AN ORGANIZED HEALTH CARE EDUCATION/TRAINING PROGRAM

## 2017-05-08 PROCEDURE — 87070 CULTURE OTHR SPECIMN AEROBIC: CPT

## 2017-05-08 PROCEDURE — 82945 GLUCOSE OTHER FLUID: CPT

## 2017-05-08 PROCEDURE — 32554 ASPIRATE PLEURA W/O IMAGING: CPT | Mod: LT,,, | Performed by: INTERNAL MEDICINE

## 2017-05-08 PROCEDURE — 32555 ASPIRATE PLEURA W/ IMAGING: CPT

## 2017-05-08 PROCEDURE — 99233 SBSQ HOSP IP/OBS HIGH 50: CPT | Mod: ,,, | Performed by: INTERNAL MEDICINE

## 2017-05-08 PROCEDURE — 83615 LACTATE (LD) (LDH) ENZYME: CPT

## 2017-05-08 PROCEDURE — 63600175 PHARM REV CODE 636 W HCPCS: Performed by: INTERNAL MEDICINE

## 2017-05-08 PROCEDURE — 25500020 PHARM REV CODE 255: Performed by: INTERNAL MEDICINE

## 2017-05-08 PROCEDURE — 99254 IP/OBS CNSLTJ NEW/EST MOD 60: CPT | Mod: 25,,, | Performed by: INTERNAL MEDICINE

## 2017-05-08 PROCEDURE — 36415 COLL VENOUS BLD VENIPUNCTURE: CPT

## 2017-05-08 PROCEDURE — 94761 N-INVAS EAR/PLS OXIMETRY MLT: CPT

## 2017-05-08 PROCEDURE — 85025 COMPLETE CBC W/AUTO DIFF WBC: CPT

## 2017-05-08 PROCEDURE — 84100 ASSAY OF PHOSPHORUS: CPT

## 2017-05-08 RX ORDER — MORPHINE SULFATE 2 MG/ML
2 INJECTION, SOLUTION INTRAMUSCULAR; INTRAVENOUS ONCE
Status: COMPLETED | OUTPATIENT
Start: 2017-05-08 | End: 2017-05-08

## 2017-05-08 RX ORDER — MORPHINE SULFATE 15 MG/1
15 TABLET ORAL EVERY 4 HOURS PRN
Status: DISCONTINUED | OUTPATIENT
Start: 2017-05-08 | End: 2017-05-09

## 2017-05-08 RX ORDER — MORPHINE SULFATE 15 MG/1
15 TABLET ORAL EVERY 4 HOURS
Status: DISCONTINUED | OUTPATIENT
Start: 2017-05-08 | End: 2017-05-08

## 2017-05-08 RX ORDER — LIDOCAINE HYDROCHLORIDE AND EPINEPHRINE 15; 5 MG/ML; UG/ML
5 INJECTION, SOLUTION EPIDURAL ONCE
Status: COMPLETED | OUTPATIENT
Start: 2017-05-08 | End: 2017-05-08

## 2017-05-08 RX ORDER — MAGNESIUM SULFATE HEPTAHYDRATE 40 MG/ML
2 INJECTION, SOLUTION INTRAVENOUS
Status: COMPLETED | OUTPATIENT
Start: 2017-05-08 | End: 2017-05-08

## 2017-05-08 RX ADMIN — INSULIN ASPART 3 UNITS: 100 INJECTION, SOLUTION INTRAVENOUS; SUBCUTANEOUS at 06:05

## 2017-05-08 RX ADMIN — ATORVASTATIN CALCIUM 40 MG: 20 TABLET, FILM COATED ORAL at 08:05

## 2017-05-08 RX ADMIN — MORPHINE SULFATE 2 MG: 2 INJECTION, SOLUTION INTRAMUSCULAR; INTRAVENOUS at 01:05

## 2017-05-08 RX ADMIN — SODIUM CHLORIDE 500 ML: 0.9 INJECTION, SOLUTION INTRAVENOUS at 07:05

## 2017-05-08 RX ADMIN — PANTOPRAZOLE SODIUM 40 MG: 40 TABLET, DELAYED RELEASE ORAL at 08:05

## 2017-05-08 RX ADMIN — VITAMIN D, TAB 1000IU (100/BT) 1000 UNITS: 25 TAB at 08:05

## 2017-05-08 RX ADMIN — MORPHINE SULFATE 4 MG: 2 INJECTION, SOLUTION INTRAMUSCULAR; INTRAVENOUS at 05:05

## 2017-05-08 RX ADMIN — INSULIN DETEMIR 2 UNITS: 100 INJECTION, SOLUTION SUBCUTANEOUS at 09:05

## 2017-05-08 RX ADMIN — IOHEXOL 75 ML: 350 INJECTION, SOLUTION INTRAVENOUS at 04:05

## 2017-05-08 RX ADMIN — METHADONE HYDROCHLORIDE 5 MG: 5 TABLET ORAL at 08:05

## 2017-05-08 RX ADMIN — HYDROCODONE BITARTRATE AND ACETAMINOPHEN 1 TABLET: 10; 325 TABLET ORAL at 03:05

## 2017-05-08 RX ADMIN — MORPHINE SULFATE 2 MG: 2 INJECTION, SOLUTION INTRAMUSCULAR; INTRAVENOUS at 12:05

## 2017-05-08 RX ADMIN — ENOXAPARIN SODIUM 40 MG: 100 INJECTION SUBCUTANEOUS at 05:05

## 2017-05-08 RX ADMIN — MORPHINE SULFATE 15 MG: 15 TABLET ORAL at 08:05

## 2017-05-08 RX ADMIN — MAGNESIUM SULFATE IN WATER 2 G: 40 INJECTION, SOLUTION INTRAVENOUS at 07:05

## 2017-05-08 RX ADMIN — LEVOTHYROXINE SODIUM 100 MCG: 100 TABLET ORAL at 06:05

## 2017-05-08 RX ADMIN — MORPHINE SULFATE 4 MG: 2 INJECTION, SOLUTION INTRAMUSCULAR; INTRAVENOUS at 04:05

## 2017-05-08 RX ADMIN — CLOPIDOGREL 75 MG: 75 TABLET, FILM COATED ORAL at 08:05

## 2017-05-08 RX ADMIN — GABAPENTIN 300 MG: 300 CAPSULE ORAL at 06:05

## 2017-05-08 RX ADMIN — MORPHINE SULFATE 4 MG: 2 INJECTION, SOLUTION INTRAMUSCULAR; INTRAVENOUS at 08:05

## 2017-05-08 RX ADMIN — MAGNESIUM SULFATE IN WATER 2 G: 40 INJECTION, SOLUTION INTRAVENOUS at 09:05

## 2017-05-08 RX ADMIN — Medication 24 G: at 07:05

## 2017-05-08 RX ADMIN — PANCRELIPASE 3 CAPSULE: 60000; 12000; 38000 CAPSULE, DELAYED RELEASE PELLETS ORAL at 06:05

## 2017-05-08 RX ADMIN — MORPHINE SULFATE 4 MG: 2 INJECTION, SOLUTION INTRAMUSCULAR; INTRAVENOUS at 09:05

## 2017-05-08 RX ADMIN — MORPHINE SULFATE 4 MG: 2 INJECTION, SOLUTION INTRAMUSCULAR; INTRAVENOUS at 02:05

## 2017-05-08 RX ADMIN — INSULIN ASPART 4 UNITS: 100 INJECTION, SOLUTION INTRAVENOUS; SUBCUTANEOUS at 11:05

## 2017-05-08 RX ADMIN — ASPIRIN 81 MG: 81 TABLET, COATED ORAL at 08:05

## 2017-05-08 RX ADMIN — LIDOCAINE HYDROCHLORIDE AND EPINEPHRINE 5 ML: 15; 5 INJECTION, SOLUTION EPIDURAL at 01:05

## 2017-05-08 RX ADMIN — GABAPENTIN 300 MG: 300 CAPSULE ORAL at 09:05

## 2017-05-08 RX ADMIN — Medication 12.5 MG: at 08:05

## 2017-05-08 RX ADMIN — MORPHINE SULFATE 30 MG: 15 TABLET, EXTENDED RELEASE ORAL at 06:05

## 2017-05-08 RX ADMIN — MORPHINE SULFATE 15 MG: 15 TABLET ORAL at 11:05

## 2017-05-08 RX ADMIN — MORPHINE SULFATE 15 MG: 15 TABLET ORAL at 02:05

## 2017-05-08 RX ADMIN — GABAPENTIN 300 MG: 300 CAPSULE ORAL at 02:05

## 2017-05-08 NOTE — PLAN OF CARE
MDR's with Dr. Costa, patient to have thoracentesis with pulmonary today, pain meds adjusted as per Dr. Costa, CM will monitor patient progression and needs.

## 2017-05-08 NOTE — SIGNIFICANT EVENT
Pulmonary Medicine    Arrived to perform thoracentesis on patient, noted that the patient's right sided chest tube had fallen out. Patient was hemodynamically stable without any change in oxygenation. U/S was performed at bedside and patient was noted to have pleural slide on the left lung and M mode suggested good pleural apposition and slide. Will proceed with thoracentesis.     Dread Kearns MD  Fellow, [facility]  Pager: [phone]    5/8/2017  1:36 PM

## 2017-05-08 NOTE — PROGRESS NOTES
Pulmonary Medicine Progress Note    Interval History:    Patient underwent thoracentesis of L pleural effusion. She tolerated the procedure well. CXR revealed a pneumothorax on the left, along with one on the right despite the pleural catheter placement. The patient has no distress, no dyspnea, no hypoxia. She denies chest pain. Since her procedure, she has no worsening O2 requirements.     Objective:    Temp:  [97 °F (36.1 °C)-98.8 °F (37.1 °C)] 97.3 °F (36.3 °C)  Pulse:  [55-86] 56  Resp:  [13-25] 18  SpO2:  [92 %-100 %] 100 %  BP: ()/(54-69) 100/59    GEN: WD, thin, ill appearing, mild distress, cooperative, appropriate  HEENT: NC/AT, PERRL, EOMI, no icterus/injection, no pharyngeal edema/erythema, MMM  NECK: Supple, trachea midline, no JVD, thyromegaly, no LAD  CVS: S1 and S2 audible, RRR, no MRG appreciated  RESP: Symmetric excursion, breath sounds are heard B/L, though reduced on anterior auscultation  ABD: BSx4, soft, NT/ND, no masses, no HSM  EXT: Palpable and symmetric pulses, no CCE  SKIN: Warm, moist, no lesions, rashes, breakdown  NEURO: AAOx3, CNII-XII intact and symmetric, 5/5 strength in B/L UE and LE    CXR: B/L pneumothoraces, larger on the L, though pleura appears adherent the the wall in the upper lung zones, patchy interstitial markings in both lungs     Assessment and Recommendations    Metastatic Pancreatic Cancer   Malignant Pleural Effusions   B/L Trapped Lung s/p Thoracentesis    - The B/L pneumothoraces appear to be related to trapped lung and thoracentesis; do not feel that these are true pneumothoraces   - Have placed the patient on 100% NRB, continue   - Will check repeat CXR and assess any progression   - At this time, there is no need for tube thoracostomy, but will continue to follow closely  - Concern for lymphangitic spread of the cancer considering the patchy infiltrates; will follow up on CTA chest   - Also concern for possible PE considering her risks and the history of  her dyspnea and hypoxia      Will continue to follow.    Dread Kearns MD  Fellow, [facility]  Pager: [phone]    5/8/2017  5:22 PM

## 2017-05-08 NOTE — ASSESSMENT & PLAN NOTE
- Will reduce home insulin regimen slightly, using insulin detemir 2U subQ BID, insulin aspart 3U subQ TIDWM with additional low-dose sliding scale insulin as needed.

## 2017-05-08 NOTE — PROCEDURES
"Lashay Whitley is a 57 y.o. female patient.    Temp: 97 °F (36.1 °C) (05/07/17 2339)  Pulse: 64 (05/07/17 2339)  Resp: 18 (05/07/17 2339)  BP: 123/68 (05/07/17 2339)  SpO2: 98 % (05/07/17 2339)  Weight: 44.9 kg (99 lb) (05/07/17 1809)  Height: 4' 11" (149.9 cm) (05/07/17 1809)       Chest Tube Insertion  Date/Time: 5/8/2017 1:31 AM  Performed by: DO RIVERA  Authorized by: DO RIVERA   Post-operative diagnosis: pneumothorax.  Pre-operative diagnosis: same.   Consent Done: Yes  Consent: Verbal consent obtained.  Risks and benefits: risks, benefits and alternatives were discussed  Consent given by: patient  Indications: pneumothorax  Patient sedated: no  Anesthesia: local infiltration    Anesthesia:  Anesthesia: local infiltration  Preparation: skin prepped with ChloraPrep  Placement location: right anterior  Tube size: 8 K[place]  Dressing: 4x4 sterile gauze and petrolatum-impregnated gauze          Do Rivera  5/8/2017    "

## 2017-05-08 NOTE — CONSULTS
Pulmonary Critical Care Consult Note.   Consults Attending: Dr. Natasha Metcalf.     Reason for consultation: Pneumothorax.     Subjective     HPI:     58 y/o Female with h/o Metastatic pancreatic cancer with recurrence (s/p Whipple), CAD (on plavix - NSTEMI 03/2017) who presents to the Ed with complaints of SOB with  associated chest pain that is particularly worse over the last couple of days.     Her CXR in the ED revealed a R sided PTX. She recently was evaluated in Pulmonary Clinic for SOB - Her CXR revealed bilateral Pleural effusions that were presumed to be related to her malignancy. Underwent thoracentesis on 5/3/17 with 1200 cc of serous fluid removed.     Otherwise, she has no fevers, no cough or productive sputum related to these sx. Of note, she has required 2-3L Nc at home over the last few weeks due to SOB.     Penicillins; Demerol [meperidine]; Dilaudid [hydromorphone (bulk)]; Percocet [oxycodone-acetaminophen]; and Adhesive tape-silicones    Past Medical History:   Diagnosis Date    Breast cancer     bilateral    Broken rib     right    CAD (coronary artery disease)     Cataract     Centrilobular emphysema: per CT 2016 9/29/2016    Colon polyps     Coronary atherosclerosis of native coronary artery 4/24/2015    2 stents    Diabetes mellitus type 2 in nonobese     Diverticulosis of large intestine without hemorrhage 10/23/2015    Encounter for blood transfusion     Gastroesophageal reflux disease with esophagitis 10/23/2015    GI bleed     Hyperlipidemia     Hypertension     Myocardial infarction 2011    Myocardial infarction     Pancreas cancer     Pancreatitis     PONV (postoperative nausea and vomiting)     Type 2 diabetes mellitus with hyperglycemia 11/3/2015    Unspecified essential hypertension 4/24/2015    Vertebral fracture, pathological     l3, right side       Past Surgical History:   Procedure Laterality Date    APPENDECTOMY      BREAST RECONSTRUCTION      post mast     CARDIAC CATHETERIZATION  10/29/11    CATARACT EXTRACTION W/  INTRAOCULAR LENS IMPLANT Left 04/09/2015    Dr. Warner    CATARACT EXTRACTION W/  INTRAOCULAR LENS IMPLANT Right 05/14/15    Dr Warner    COLONOSCOPY N/A 10/19/2016    Procedure: COLONOSCOPY;  Surgeon: Alexis Tubbs MD;  Location: [facility] ([address] [place] [address]);  Service: Endoscopy;  Laterality: N/A;    CORONARY ANGIOPLASTY WITH STENT PLACEMENT  09/08/2011    x2    HYSTERECTOMY      MASTECTOMY Bilateral     bilateral    RK/AK Bilateral     TONSILLECTOMY      WHIPPLE PROCEDURE W/ LAPAROSCOPY  4/2016       Social History     Social History    Marital status:      Spouse name: N/A    Number of children: N/A    Years of education: N/A     Occupational History    Not on file.     Social History Main Topics    Smoking status: Former Smoker     Packs/day: 1.50     Years: 40.00     Types: Cigarettes     Quit date: 10/1/2015    Smokeless tobacco: Never Used      Comment: currently vapes    Alcohol use No    Drug use: No    Sexual activity: Yes     Partners: Male     Other Topics Concern    Not on file     Social History Narrative       Family History   Problem Relation Age of Onset    Colon cancer Father 60    Cancer Father     Crohn's disease Sister     Diabetes Sister     Cancer Sister      breast    Hypertension Mother     Macular degeneration Mother     Cancer Mother      breast    Diabetes Mother     Cancer Sister      breast    Diabetes Sister     Heart disease Brother     Diabetes Maternal Aunt     Diabetes Maternal Grandmother     Ulcerative colitis Neg Hx          Positive findings are in BOLD. Otherwise negative ROS as below.     CONSTITUTIONAL: weight loss, fever, chills, weakness or fatigue.   HEENT: visual loss, blurred vision, double vision or yellow sclerae. Ears, Nose, Throat: No hearing loss, sneezing, congestion, runny nose or sore throat.   CARDIOVASCULAR: chest pain, chest pressure or chest discomfort. No  palpitations or edema.   RESPIRATORY: shortness of breath, cough or sputum.   GASTROINTESTINAL:anorexia, nausea, vomiting or diarrhea. No abdominal pain or blood.   NEUROLOGICAL: headache, dizziness, syncope, paralysis, ataxia, numbness or tingling in the extremities. No change in bowel or bladder control.   MUSCULOSKELETAL: muscle, back pain, joint pain or stiffness.   HEMATOLOGIC: anemia, bleeding or bruising.   LYMPHATICS: enlarged nodes. No history of splenectomy.   PSYCHIATRIC: history of depression or anxiety.   ENDOCRINOLOGIC: No reports of sweating, cold or heat intolerance. No polyuria or polydipsia.     OBJECTIVE    Vitals:    05/07/17 2339   BP: 123/68   Pulse: 64   Resp: 18   Temp: 97 °F (36.1 °C)       Gen: alert and oriented. NAD,    HEENT: oral mucosa moist, free of lesions, no dental abnormalities, neck free of lymphadenopathy. JVD negative, no eye abnormalities. Pupils appears equal and reactive.   Chest: decreased breath sounds on the R. Mild wheezing in other lung fields.   Heart: RRR, No M/G/r.   Abdomen: soft, non tender, no masses. No g/r/r  Extremities: no edema. No cyanosis. No deformities.     RESULTS    Labs: Reviewed in EMR.    CXR: Reviewed in EMR.    Other diagnostics: Reviewed in EMR.    Assesment and PLAN.     56 y/o Female with h/o Metastatic pancreatic cancer with recurrence (s/p Whipple), CAD (on plavix - NSTEMI 03/2017) who presents to the Ed with complaints of SOB with  associated chest pain that is particularly worse over the last couple of days.    CXR reveals PTX after recent thoracentesis.     1. Iatrogenic PTX.     Based on prior films - suspect this is iatrogenic PTx vs trapped lung.     Given patient remains symptomatic and non improvement of PTX with conservative measures (non rebreather oxygen mask) - will go ahead and place a pig tail catheter for evacuation of PTX.     Additional reasons for patients dyspnea are enlarging left pleural effusion. However before performing  L sided thoracentesis, will need to address current PTX.    After discussion with staff in AM, additional recs if any to be provided by day fellow.    Doug Lebron  Pulmonary Critical Care fellow.   [facility].   Cell:[phone]

## 2017-05-08 NOTE — PROCEDURES
"Lashay Whitley is a 57 y.o. female patient.    Temp: 97.3 °F (36.3 °C) (17 1245)  Pulse: (!) 55 (17 1245)  Resp: 18 (17 1245)  BP: (!) 100/59 (17 1300)  SpO2: 98 % (17 1245)  Weight: 44.9 kg (99 lb) (17 180)  Height: 4' 11" (149.9 cm) (17)       Thoracentesis  Date/Time: 2017 3:16 PM  Performed by: DREAD KEARNS  Authorized by: DREAD KEARNS   Pre-operative diagnosis: Pleural effusion  Post-operative diagnosis: Pleural effusion  Consent Done: Yes  Consent: Verbal consent obtained. Written consent obtained.  Consent given by: patient  Patient understanding: patient states understanding of the procedure being performed  Patient consent: the patient's understanding of the procedure matches consent given  Relevant documents: relevant documents present and verified  Site marked: the operative site was marked  Imaging studies: imaging studies available  Patient identity confirmed: , MRN, name, verbally with patient and provided demographic data  Time out: Immediately prior to procedure a "time out" was called to verify the correct patient, procedure, equipment, support staff and site/side marked as required.  Procedure purpose: diagnostic and therapeutic  Indications: pleural effusion  Preparation: Patient was prepped and draped in the usual sterile fashion.  Local anesthesia used: yes  Anesthesia: local infiltration    Anesthesia:  Local anesthesia used: yes  Anesthesia: local infiltration  Local Anesthetic: lidocaine 1% with epinephrine   Patient sedated: no  Preparation: skin prepped with ChloraPrep  Patient position: sitting  Ultrasound guidance: yes  Location: left posterior  Puncture method: over-the-needle catheter  Number of attempts: 1  Drainage amount: 1100 ml  Drainage characteristics: serous  Patient tolerance: Patient tolerated the procedure well with no immediate complications  Chest x-ray performed: yes  Complications: No        Dread Kearns" MD  Fellow, [facility]  Pager: [phone]    5/8/2017  3:18 PM

## 2017-05-08 NOTE — PROGRESS NOTES
Admit Assessment    Patient Identification  Lashay Whitley   :  1960  Admit Date:  2017  Attending Provider:  Lenora Costa MD              Referral:   Pt was admitted to  with a diagnosis of Pneumothorax, and was admitted this hospital stay due to Shortness of breath [R06.02]  Pleural effusion [J90]  Pneumothorax, unspecified type [J93.9].         is involved was referred to the Social Work Department via routine referral.  Patient presents as a 57 y.o. year old  female.    Persons interviewed : Patient and . Patient's  is able to provide 24 hour care for her    Living Situation:      Resides at [address] [place] [address] [id number] [place] [id number], phone: [phone] (home).          Current or Past Agencies and Description of Services/Supplies    DME  Agency Name: [facility]   Equipment: Patient has home oxygen       Home Health: Patient not currently on home health services. She expressed that she is unsure about having the services. She was referred but with her new insurance she is responsible for $60 a visit. She would like to see how she does during this hospitalization on whether she would like services upon discharge.       IV Infusion: N/A      Nutrition: Oral     Outpatient Pharmacy:     [facility]/pharmacy #[id number] - [place], [place] - [address] [facility]  [address] [facility]  [place] [address]  Phone: [phone] Fax: [phone]      Patient Preference of agencies include : Patient does not express any preference     Patient/Caregiver informed of right to choose providers or agencies.  Patient provides permission to release any necessary information to [facility] and to Non[facility] agencies as needed to facilitate patient care, treatment planning, and patient discharge planning.  Written and verbal resources provided.      Coping: Patient is concerned about finances. I discussed with her some of the resources such as the patient assistance fund. She was  recently ordered a nebulizer machine but it was not approved so she is wanting to see if she can get one approved this hospitalization because she feels it makes her feel better.           Adjustment to Diagnosis and Treatment: Appropriate    Emotional/Behavioral/Cognitive Issues: None observed or noted            History/Current Symptoms of Anxiety/Depression: No:  History/Current Substance Use:   Social History     Social History Main Topics    Smoking status: Former Smoker     Packs/day: 1.50     Years: 40.00     Types: Cigarettes     Quit date: 10/1/2015    Smokeless tobacco: Never Used      Comment: currently vapes    Alcohol use No    Drug use: No    Sexual activity: Yes     Partners: Male       Indications of Abuse/Neglect: No:   Abuse Screen  Do You Feel Unsafe at Home, Work or School?: no    Financial:  Payor/Plan Subscr  Sex Relation Sub. Ins. ID Effective Group Num   1. [facility] [place]* ALE GILLE R 1960 Female  [id number]* [id number]                                   [address] [address]                            Other identified concerns/needs: None at this time    Plan: Home with potential for home health for management of chest tube    Interventions/Referrals: Possible home health referral   Patient/caregiver engaged in treatment planning process.     providing psychosocial and supportive counseling, resources, education, assistance and discharge planning as appropriate.  Patient/caregiver state understanding of  available resources,  following, remains available.

## 2017-05-08 NOTE — ASSESSMENT & PLAN NOTE
- L sided pleural effusion noted to be increased in size from prior CXR, 05/03.  - Given concurrent pneumothorax of R, will defer drainage at this time.

## 2017-05-08 NOTE — ASSESSMENT & PLAN NOTE
- Pancreatic adenocarcinoma treated with chemotherapy, radiation therapy followed by Dr. Nguyễn in clinic.  - Continuing pain control with methadone 5mg PO q12hr, MS-contin 30mg PO q8hr; given some worsening pain at home will increase home hydrocodone-acetaminophen to 10-325mg PO q6hr PRN with morphine 4mg IV q4hr PRN for breakthrough pain control.  - Suspect pleural effusions related to pancreatic malignancy.

## 2017-05-08 NOTE — PROGRESS NOTES
Hematology/Oncology   Progress Note                                                              Team: Networked reference to record PCT     Patient Name: Lashay Whitley  YOB: 1960    Admit Date: 5/7/2017                     LOS: 1    SUBJECTIVE:     58 y/o F with h/o breast cancer (2000 s/p bilateral mastectomy and chemo), pancreatic cancer (dx: with recurrence (s/p Whipple), CAD (s/p PCI x2 in 2012; NSTEMI in Mar 2017), HLD, Hypothyroidism, T2DM who presents to the ED with complaints of SOB with associated chest pain    Brief oncologic history:   H/o of breast cancer - 2000 s/p bilateral mastectomy + chemo; in remission since  H/o of pancreatic cancer (ductal adenocarcinoma)- diagnosed 2012 - s/p folforinox x 4 cycles + whipple + adjuvant gemcitabine which was d/c due to poor tolerance   - mets to liver: 11/22/16 - biopsied   - mets to bone (T-spine) - received radiation  Pt of Dr. Almonte    Reason for Admission:  Pneumothorax  See H&P for detailed initial presentation history and ROS.      Interval history:   O/N chest tube placed by pulm fellow on call. S/p placement, CXR shows     OBJECTIVE:     Vital Signs Range (Last 24H):  Temp:  [97 °F (36.1 °C)-98.8 °F (37.1 °C)]   Pulse:  [60-86]   Resp:  [13-25]   BP: ()/(61-69)   SpO2:  [92 %-100 %] Body mass index is 20 kg/(m^2).  Wt Readings from Last 1 Encounters:   05/07/17 1809 44.9 kg (99 lb)       I & O (Last 24H):  Intake/Output Summary (Last 24 hours) at 05/08/17 1040  Last data filed at 05/08/17 0602   Gross per 24 hour   Intake                0 ml   Output              209 ml   Net             -209 ml     Physical Exam:  General: well developed, no distress  Eyes: conjunctivae/corneas clear. PERRL..  HENT: Head:normocephalic, atraumatic.  Neck: supple, symmetrical, trachea midline, no JVD and thyroid not enlarged  Lungs: Diffuse expiratory wheezes on right. Diminished breath sounds on left lung base  Cardiovascular: Heart: regular rate  and rhythm, S1, S2 normal, no murmur, click, rub or gallop. Pulses: 2+ and symmetric.  Abdomen: soft, non-tender non-distented; bowel sounds normal; no masses,  no organomegaly.   Skin: Skin color, texture, turgor normal. No rashes or lesion  Neurologic: Normal strength and tone. No focal numbness or weakness  Psych/Behavioral:  Alert and oriented x3.    Diagnostic Results:  Lab Results   Component Value Date    WBC 5.16 05/08/2017    HGB 11.8 (L) 05/08/2017    HCT 34.7 (L) 05/08/2017    MCV 82 05/08/2017     05/08/2017       Recent Labs  Lab 05/08/17  0331   GLU 65*      K 4.0      CO2 26   BUN 14   CREATININE 0.6   CALCIUM 8.8   MG 1.5*     Lab Results   Component Value Date    INR 1.0 05/07/2017    INR 1.0 03/08/2017    INR 1.0 12/06/2016     Lab Results   Component Value Date    HGBA1C 5.7 05/08/2017     Recent Labs      05/07/17   2149  05/08/17   0733  05/08/17   0816   POCTGLUCOSE  169*  69*  119*     CXR (5/7/17): Right pneumothorax, with possible chest tube versus extraneous material overlying the patient, correlation is recommended.    ASSESSMENT/PLAN:   56 y/o F with h/o breast cancer (2000 s/p bilateral mastectomy and chemo), pancreatic cancer (dx: with recurrence (s/p Whipple), CAD (s/p PCI x2 in 2012; NSTEMI in Mar 2017), HLD, Hypothyroidism, T2DM who presents to the ED with complaints of SOB with associated chest pain.     SOB  Pneumothorax - on right lung  Pleural effusion - on left lung  - SOB 2/2 pneumothorax vs. Pleural effusion; both likely from progressive malignancy  - clinically, patient feels more relief c/w initial presentaiton  - per Pulm recs, CTA to r/o PE  - pneumothorax on right lung vs trapped lung s/p chest tube placement 5/7/17; will monitor CT output q24h. Appreciate Pulm assistance  - pleural effusion   - h/o b/l pleural effusion noted on CT Chest 2/27/17   - s/p thoracentesis on right on 5/3/17. Effusion from that tap consistent for exudative effusion per [facility]  Criteria   - Effusion on left noted to be increased on admission 5/7/17 c/w CXR from 5/3/17. Pulm on board & plans for thoracentesis today after evaluating CTA   - previously evaluated by Dr. Deluna in clinic on 3/8/17 who recommended VATS procedure but was not done due recent NSTEMI. At this time, will let Pulmonologists (Dr. Metcalf + team) guide mgmt as she has communicated with primary oncologist and has a mutual plan for the patient.    Pancreatic cancer (Ductal carcinoma, stage 4)  Bone metastases  Liver metastases  - w/ mets to liver, bone and lung (pleural effusion = exudative; likely malignancy)  - pain management: at home consists of methadone 5mg BID, MS contin TID, neurontin 300mg TID   - confirmed with primary oncologist regarding use of MS contin & methadone to confirm patient was intentionally placed on both   - for inpatient - will start Methadone 5mg BID + morphine IR 15mg q4h and gabapentin 300mg TID & titrate based on pain control  - nausea: zoFran 8mg q8h prn & IV zofran 4mg q8h prn  - continue creon tabs (lipase-protease-amylase) tabs order TIDWM     CAD   - s/p PCI 2012  - NSTEMI on 3/8/17 was managed medically without intervention.  - on presentation had chest pain + SOB which has now resolved. Troponin: 0.3 and ECG NSR. If patient was still symptomatic of chest pain, then would repeat troponin to see if downtrended but given lack of clinical symptoms, no need to repeat at this time.   - was on ASA, plavix, atorvastatin, metoprolol 12.5 BID  - LVEF was 50%, preserved.  - No ACEI because of low blood pressure.  - spoke to Cardiology fellow (Dr. Stanton) and confirmed that plavix can be discontinued at this time. Plavix stopped 5/8/17    Hyperlipidemia  - ASCVD: 3.9% based on lipid panel 9/13/17  - continue atorvastatin 40mg qD    T2DM  - h/o T2DM on insulin 2 detemir  + 3 U aspart TIDWM  - however, HbA1c 5.7 today. Given h/o pancreatic cancer, insulin/glucagon levels may vary  - will continue to  watch POCT AC&HS    Gastroesophageal reflux disease with esophagitis per EUS 4/14  - continue home protonix daily    Hypothyroidism  - continue home levothyroxine 100mcg qD    Diet: regular diet  DVT: enoxaparin held due to procedures planned for today; SCDs  Full code    DIspo: CTA to r/o PE & evaluate R lung in detail    Plan for L thora today  Cards consulted & confirmed no need for plavix given stents placed >1 yr ago    Patient seen and plan of care discussed with Dr. Julissa Morales MD  Internal Medicine, PGY-1  [phone]    STAFF NOTE:  I have personally taken the history and examined this patient and agree with residents Note as stated above

## 2017-05-08 NOTE — SUBJECTIVE & OBJECTIVE
Oncology Treatment Plan:   OP ABRAXANE + GEMCITABINE    Medications:  Continuous Infusions:   Scheduled Meds:   [START ON 5/8/2017] aspirin  81 mg Oral Daily    [START ON 5/8/2017] atorvastatin  40 mg Oral Daily    [START ON 5/8/2017] clopidogrel  75 mg Oral Daily    [START ON 5/8/2017] enoxaparin  40 mg Subcutaneous Daily    gabapentin  300 mg Oral TID    [START ON 5/8/2017] levothyroxine  100 mcg Oral Daily    lidocaine-EPINEPHrine 1%-1:100,000  1 mL Other Once    methadone  5 mg Oral Q12H    metoprolol tartrate  12.5 mg Oral BID    morphine  30 mg Oral Q8H    [START ON 5/8/2017] pantoprazole  40 mg Oral Daily    [START ON 5/8/2017] vitamin D  1,000 Units Oral Daily     PRN Meds:acetaminophen, hydrocodone-acetaminophen 10-325mg, morphine, ondansetron, ondansetron     Review of patient's allergies indicates:   Allergen Reactions    Penicillins Swelling    Demerol [meperidine] Swelling    Dilaudid [hydromorphone (bulk)] Other (See Comments)     redness    Percocet [oxycodone-acetaminophen] Swelling    Adhesive tape-silicones Rash        Past Medical History:   Diagnosis Date    Breast cancer     bilateral    Broken rib     right    CAD (coronary artery disease)     Cataract     Centrilobular emphysema: per CT 2016 9/29/2016    Colon polyps     Coronary atherosclerosis of native coronary artery 4/24/2015    2 stents    Diabetes mellitus type 2 in nonobese     Diverticulosis of large intestine without hemorrhage 10/23/2015    Encounter for blood transfusion     Gastroesophageal reflux disease with esophagitis 10/23/2015    GI bleed     Hyperlipidemia     Hypertension     Myocardial infarction 2011    Myocardial infarction     Pancreas cancer     Pancreatitis     PONV (postoperative nausea and vomiting)     Type 2 diabetes mellitus with hyperglycemia 11/3/2015    Unspecified essential hypertension 4/24/2015    Vertebral fracture, pathological     l3, right side     Past Surgical  History:   Procedure Laterality Date    APPENDECTOMY      BREAST RECONSTRUCTION      post mast    CARDIAC CATHETERIZATION  10/29/11    CATARACT EXTRACTION W/  INTRAOCULAR LENS IMPLANT Left 04/09/2015    Dr. Warner    CATARACT EXTRACTION W/  INTRAOCULAR LENS IMPLANT Right 05/14/15    Dr Warner    COLONOSCOPY N/A 10/19/2016    Procedure: COLONOSCOPY;  Surgeon: Alexis Tubbs MD;  Location: [facility] ([address] [place] [address]);  Service: Endoscopy;  Laterality: N/A;    CORONARY ANGIOPLASTY WITH STENT PLACEMENT  09/08/2011    x2    HYSTERECTOMY      MASTECTOMY Bilateral     bilateral    RK/AK Bilateral     TONSILLECTOMY      WHIPPLE PROCEDURE W/ LAPAROSCOPY  4/2016     Family History     Problem Relation (Age of Onset)    Cancer Father, Sister, Mother, Sister    Colon cancer Father (60)    Crohn's disease Sister    Diabetes Sister, Mother, Sister, Maternal Aunt, Maternal Grandmother    Heart disease Brother    Hypertension Mother    Macular degeneration Mother        Social History Main Topics    Smoking status: Former Smoker     Packs/day: 1.50     Years: 40.00     Types: Cigarettes     Quit date: 10/1/2015    Smokeless tobacco: Never Used      Comment: currently vapes    Alcohol use No    Drug use: No    Sexual activity: Yes     Partners: Male       Review of Systems   Constitutional: Positive for diaphoresis and fatigue. Negative for chills and fever.   HENT: Negative for sore throat and trouble swallowing.    Eyes: Negative for pain and visual disturbance.   Respiratory: Positive for shortness of breath and wheezing. Negative for cough.    Cardiovascular: Positive for chest pain. Negative for palpitations.   Gastrointestinal: Negative for abdominal pain, nausea and vomiting.   Genitourinary: Negative for difficulty urinating and dysuria.   Musculoskeletal: Positive for myalgias. Negative for arthralgias.   Skin: Positive for pallor. Negative for rash and wound.   Neurological: Negative for weakness, numbness and  headaches.     Objective:     Vital Signs (Most Recent):  Temp: 98 °F (36.7 °C) (05/07/17 1809)  Pulse: 86 (05/07/17 1959)  Resp: 13 (05/07/17 1900)  BP: 112/62 (05/07/17 1959)  SpO2: 95 % (05/07/17 2001) Vital Signs (24h Range):  Temp:  [98 °F (36.7 °C)] 98 °F (36.7 °C)  Pulse:  [72-86] 86  Resp:  [13-25] 13  SpO2:  [92 %-97 %] 95 %  BP: ()/(62-64) 112/62     Weight: 44.9 kg (99 lb)  Body mass index is 20 kg/(m^2).  Body surface area is 1.37 meters squared.    No intake or output data in the 24 hours ending 05/07/17 2031    Physical Exam   Constitutional: She is oriented to person, place, and time. She appears well-developed. She appears cachectic. No distress.   HENT:   Head: Normocephalic and atraumatic.   Nose: Nose normal.   Mouth/Throat: Oropharynx is clear and moist.   Eyes: EOM are normal. Pupils are equal, round, and reactive to light.   Neck: Normal range of motion. Neck supple.   Cardiovascular: Normal rate, regular rhythm, normal heart sounds and intact distal pulses.    Pulmonary/Chest: Effort normal. She has wheezes.   Diffuse mild wheezes; L-sided basilar crackles and mildly diminished breath sounds RLL   Abdominal: Soft. Bowel sounds are normal. She exhibits no distension. There is no tenderness.   Musculoskeletal: Normal range of motion. She exhibits no edema.   Neurological: She is alert and oriented to person, place, and time.   Skin: Skin is warm and dry. No rash noted.   Vitals reviewed.    Significant Labs:   Recent Results (from the past 24 hour(s))   CBC auto differential    Collection Time: 05/07/17  6:45 PM   Result Value Ref Range    WBC 6.50 3.90 - 12.70 K/uL    RBC 4.34 4.00 - 5.40 M/uL    Hemoglobin 12.0 12.0 - 16.0 g/dL    Hematocrit 35.6 (L) 37.0 - 48.5 %    MCV 82 82 - 98 fL    MCH 27.6 27.0 - 31.0 pg    MCHC 33.7 32.0 - 36.0 %    RDW 13.8 11.5 - 14.5 %    Platelets 189 150 - 350 K/uL    MPV 9.2 9.2 - 12.9 fL    Gran # 5.4 1.8 - 7.7 K/uL    Lymph # 0.4 (L) 1.0 - 4.8 K/uL     Mono # 0.5 0.3 - 1.0 K/uL    Eos # 0.1 0.0 - 0.5 K/uL    Baso # 0.03 0.00 - 0.20 K/uL    Gran% 82.6 (H) 38.0 - 73.0 %    Lymph% 6.6 (L) 18.0 - 48.0 %    Mono% 8.0 4.0 - 15.0 %    Eosinophil% 2.0 0.0 - 8.0 %    Basophil% 0.5 0.0 - 1.9 %    Differential Method Automated    Comprehensive metabolic panel    Collection Time: 05/07/17  6:45 PM   Result Value Ref Range    Sodium 141 136 - 145 mmol/L    Potassium 3.6 3.5 - 5.1 mmol/L    Chloride 106 95 - 110 mmol/L    CO2 23 23 - 29 mmol/L    Glucose 119 (H) 70 - 110 mg/dL    BUN, Bld 16 6 - 20 mg/dL    Creatinine 0.7 0.5 - 1.4 mg/dL    Calcium 8.9 8.7 - 10.5 mg/dL    Total Protein 6.6 6.0 - 8.4 g/dL    Albumin 2.9 (L) 3.5 - 5.2 g/dL    Total Bilirubin 0.4 0.1 - 1.0 mg/dL    Alkaline Phosphatase 119 55 - 135 U/L    AST 19 10 - 40 U/L    ALT 14 10 - 44 U/L    Anion Gap 12 8 - 16 mmol/L    eGFR if African American >60.0 >60 mL/min/1.73 m^2    eGFR if non African American >60.0 >60 mL/min/1.73 m^2   Brain natriuretic peptide    Collection Time: 05/07/17  6:45 PM   Result Value Ref Range    BNP 51 0 - 99 pg/mL   Magnesium    Collection Time: 05/07/17  6:45 PM   Result Value Ref Range    Magnesium 1.6 1.6 - 2.6 mg/dL   Protime-INR    Collection Time: 05/07/17  6:45 PM   Result Value Ref Range    Prothrombin Time 10.4 9.0 - 12.5 sec    INR 1.0 0.8 - 1.2   Troponin I    Collection Time: 05/07/17  6:45 PM   Result Value Ref Range    Troponin I 0.307 (H) 0.000 - 0.026 ng/mL     Diagnostic Results:  CXR 05/07/17:  Right pneumothorax, with possible chest tube versus extraneous material overlying the patient, correlation is recommended. Enlarging left pleural effusion, underlying consolidation is not excluded. This is in the setting of diffuse edema with likely underlying chronic pulmonary change.

## 2017-05-08 NOTE — PROGRESS NOTES
Dr. Urmila MD was in room when the R side chest tube was noticed to be dislodged from pt's body. Stat CXR ordered, and zaria with X-ray called, and said someone was coming up asap.

## 2017-05-08 NOTE — ASSESSMENT & PLAN NOTE
- R-sided pneumothorax suspected as primary cause of worsening SOB; differential includes progression of disease given worsened L pleural effusion, however.  - Starting high-flow 100% O2 via NRB.  - Will re-evaluate pneumothorax with repeat CXR ~2300.  - If showing evidence of worsening, will likely require chest tube placement with pulmonology.  - Appreciate pulmonology assistance.

## 2017-05-08 NOTE — NURSING
Arrived to floor via stretcher with non-rebreather mask on. Assisted pt to bed. Oriented and assesed pt.  MD talking with  and family in hallway. Will continue to monitor

## 2017-05-08 NOTE — ASSESSMENT & PLAN NOTE
- CAD s/p stenting 2012 with NSTEMI 03/2017.  - Continuing home metoprolol 12.5mg PO BID, atorvastatin 40mg PO daily, aspirin 81mg PO daily, clopidogrel 75mg PO daily.  - Patient with some chest and jaw discomfort while in ambulance; troponin mildly elevated at 0.307. Suspect secondary to demand ischemia.  - Chest and jaw pain resolved at time of examination.  - EKG from ED demonstrates no significant change from prior.  - Will continue to monitor.

## 2017-05-08 NOTE — ASSESSMENT & PLAN NOTE
- Gradually worsening SOB with worsening effusions, s/p thoracentesis 05/03.  - Patient using O2 at home ~2L.  - Given wheezing, will start albuterol-ipratropium 2.5-0.5mg nebulized q4hr PRN.

## 2017-05-08 NOTE — PLAN OF CARE
Problem: Patient Care Overview  Goal: Plan of Care Review  Outcome: Ongoing (interventions implemented as appropriate)  Pt on non rebreather at beginning of shift. Converted to high flow after right chest tube inserted.  Diabetes management continued. Plan of care reviewed with pt. Progressing towards discharge

## 2017-05-08 NOTE — H&P
[facility]  Hematology/Oncology  H&P    Patient Name: Lashay Whitley  MRN: [id number]  Admission Date: 5/7/2017  Code Status: Full Code   Attending Provider: eLnora Costa MD  Primary Care Physician: April Kelsey MD  Principal Problem:Pneumothorax    Subjective:     HPI: Ms. Whitley is a 57/F with PMH DMII, HTN, HLD, CAD s/p stenting 2012 with recent NSTEMI 03/2017, remote h/o breast cancer, pancreatic cancer with bony and liver metastasis treated with chemo and radiation followed by Dr. Nguyễn who presented to ED with worsening SOB. She states that she has noted worsening shortness of breath for the last several days especially; she recently went to Pulmonology on 05/03/17 for tap of R pleural effusion with ~1200mL fluid removed after having found bilateral effusions with worsening SOB on a heme/onc clinic visit 04/25/17. For the past several days after her tap she has noted increasing shortness of breath as well as some wheezing. Symptoms worsened today while patient had an episode of loose stool. She noted SOB, wheezing, diaphoresis and pallor; her  used a portable pulse-oximeter and found her to have bradycardia with HR 35 and oxygen saturation ~86%; they contacted EMS who brought her to the ED via ambulance. Per patient and family, while in the ambulance she had an episode of central chest pain with some jaw discomfort; this has since resolved.    complaint of an episode of shortness of breath with associated wheezing, bradycardia (to 35 BPM), diaphoresis, and oxygen saturation to 85% while sitting on the toilet having diarrhea less than 1 hour ago. Patient reports another episode of diarrhea while en route to the ED in the ambulance, and she additionally complains of chest pain and jaw pain which began en route. Upon arrival, patient is still short of breath, wheezing, and has mild jaw pain. She does not do breathing treatments at home but is normally on oxygen at home which she began 2  weeks ago. She reports feeling like she needed more oxygen today. Patient also endorses recent hemoptysis following a right thorocentesis 4 days ago. She denies recent appetite changes or fever. Patient took morphine, Neurontin, valium, Advil, and methadone today for pain control prior to arrival.      Oncologic history as per Dr. Nguyễn's note, 05/02/17:  Patient was diagnosed with borderline pancreatic cancer (10/2015).  Started on FOLFIRINOX as neoadjuvant therapy and completed 4 cycles. Started on neoadjuvant chemotherapy and XRT which was completed with some complications. Underwent a Whipple procedure on 4/12/16. Pathology was reviewed. Specimens: Head of pancreas, duodenum, common bile duct, gallbladder Procedure: Pancreaticoduodenectomy (Whipple resection), partial pancreatectomy Tumor site: Pancreatic head Tumor size: 20 mm Histologic type: Ductal adenocarcinoma Histologic grade: Well-differentiated (grade 1 ). Microscopic tumor extension: Tumor invades peripancreatic soft tissues. Margins: -Margins are negative for invasive carcinoma. Distance to closest distance to closest retroperitoneal margin is 3 mm. Distance to closest SMV margin is 1.5 mm. Distance to closest pancreatic margin is 12 mm. Treatment effect: Present, residual cancer with tumor regression but more than single cells or rare small groups of cells (partial response, score 2). No lymphovascular invasion. Perineural invasion is present. Pathologic staging: ypT3 N1 MX. Lymph nodes: Total number of lymph nodes examined: 25. Total number of lymph nodes involved: 1. She started adjuvant gemcitabine but this was discontinued because of poor tolerance. She recurred with metastatic disease recently and has had rising  EUS with liver biopsy on 11/22/16 confirmed metastatic disease  CT scans reviewed as well. Bone involvement in T spine required XRT.       Oncology Treatment Plan:   OP ABRAXANE + GEMCITABINE    Medications:  Continuous  Infusions:   Scheduled Meds:   [START ON 5/8/2017] aspirin  81 mg Oral Daily    [START ON 5/8/2017] atorvastatin  40 mg Oral Daily    [START ON 5/8/2017] clopidogrel  75 mg Oral Daily    [START ON 5/8/2017] enoxaparin  40 mg Subcutaneous Daily    gabapentin  300 mg Oral TID    [START ON 5/8/2017] levothyroxine  100 mcg Oral Daily    lidocaine-EPINEPHrine 1%-1:100,000  1 mL Other Once    methadone  5 mg Oral Q12H    metoprolol tartrate  12.5 mg Oral BID    morphine  30 mg Oral Q8H    [START ON 5/8/2017] pantoprazole  40 mg Oral Daily    [START ON 5/8/2017] vitamin D  1,000 Units Oral Daily     PRN Meds:acetaminophen, hydrocodone-acetaminophen 10-325mg, morphine, ondansetron, ondansetron     Review of patient's allergies indicates:   Allergen Reactions    Penicillins Swelling    Demerol [meperidine] Swelling    Dilaudid [hydromorphone (bulk)] Other (See Comments)     redness    Percocet [oxycodone-acetaminophen] Swelling    Adhesive tape-silicones Rash        Past Medical History:   Diagnosis Date    Breast cancer     bilateral    Broken rib     right    CAD (coronary artery disease)     Cataract     Centrilobular emphysema: per CT 2016 9/29/2016    Colon polyps     Coronary atherosclerosis of native coronary artery 4/24/2015    2 stents    Diabetes mellitus type 2 in nonobese     Diverticulosis of large intestine without hemorrhage 10/23/2015    Encounter for blood transfusion     Gastroesophageal reflux disease with esophagitis 10/23/2015    GI bleed     Hyperlipidemia     Hypertension     Myocardial infarction 2011    Myocardial infarction     Pancreas cancer     Pancreatitis     PONV (postoperative nausea and vomiting)     Type 2 diabetes mellitus with hyperglycemia 11/3/2015    Unspecified essential hypertension 4/24/2015    Vertebral fracture, pathological     l3, right side     Past Surgical History:   Procedure Laterality Date    APPENDECTOMY      BREAST RECONSTRUCTION       post mast    CARDIAC CATHETERIZATION  10/29/11    CATARACT EXTRACTION W/  INTRAOCULAR LENS IMPLANT Left 04/09/2015    Dr. Warner    CATARACT EXTRACTION W/  INTRAOCULAR LENS IMPLANT Right 05/14/15    Dr Warner    COLONOSCOPY N/A 10/19/2016    Procedure: COLONOSCOPY;  Surgeon: Alexis Tubbs MD;  Location: [facility] ([address] [place] [address]);  Service: Endoscopy;  Laterality: N/A;    CORONARY ANGIOPLASTY WITH STENT PLACEMENT  09/08/2011    x2    HYSTERECTOMY      MASTECTOMY Bilateral     bilateral    RK/AK Bilateral     TONSILLECTOMY      WHIPPLE PROCEDURE W/ LAPAROSCOPY  4/2016     Family History     Problem Relation (Age of Onset)    Cancer Father, Sister, Mother, Sister    Colon cancer Father (60)    Crohn's disease Sister    Diabetes Sister, Mother, Sister, Maternal Aunt, Maternal Grandmother    Heart disease Brother    Hypertension Mother    Macular degeneration Mother        Social History Main Topics    Smoking status: Former Smoker     Packs/day: 1.50     Years: 40.00     Types: Cigarettes     Quit date: 10/1/2015    Smokeless tobacco: Never Used      Comment: currently vapes    Alcohol use No    Drug use: No    Sexual activity: Yes     Partners: Male       Review of Systems   Constitutional: Positive for diaphoresis and fatigue. Negative for chills and fever.   HENT: Negative for sore throat and trouble swallowing.    Eyes: Negative for pain and visual disturbance.   Respiratory: Positive for shortness of breath and wheezing. Negative for cough.    Cardiovascular: Positive for chest pain. Negative for palpitations.   Gastrointestinal: Negative for abdominal pain, nausea and vomiting.   Genitourinary: Negative for difficulty urinating and dysuria.   Musculoskeletal: Positive for myalgias. Negative for arthralgias.   Skin: Positive for pallor. Negative for rash and wound.   Neurological: Negative for weakness, numbness and headaches.     Objective:     Vital Signs (Most Recent):  Temp: 98 °F (36.7 °C)  (05/07/17 1809)  Pulse: 86 (05/07/17 1959)  Resp: 13 (05/07/17 1900)  BP: 112/62 (05/07/17 1959)  SpO2: 95 % (05/07/17 2001) Vital Signs (24h Range):  Temp:  [98 °F (36.7 °C)] 98 °F (36.7 °C)  Pulse:  [72-86] 86  Resp:  [13-25] 13  SpO2:  [92 %-97 %] 95 %  BP: ()/(62-64) 112/62     Weight: 44.9 kg (99 lb)  Body mass index is 20 kg/(m^2).  Body surface area is 1.37 meters squared.    No intake or output data in the 24 hours ending 05/07/17 2031    Physical Exam   Constitutional: She is oriented to person, place, and time. She appears well-developed. She appears cachectic. No distress.   HENT:   Head: Normocephalic and atraumatic.   Nose: Nose normal.   Mouth/Throat: Oropharynx is clear and moist.   Eyes: EOM are normal. Pupils are equal, round, and reactive to light.   Neck: Normal range of motion. Neck supple.   Cardiovascular: Normal rate, regular rhythm, normal heart sounds and intact distal pulses.    Pulmonary/Chest: Effort normal. She has wheezes.   Diffuse mild wheezes; L-sided basilar crackles and mildly diminished breath sounds RLL   Abdominal: Soft. Bowel sounds are normal. She exhibits no distension. There is no tenderness.   Musculoskeletal: Normal range of motion. She exhibits no edema.   Neurological: She is alert and oriented to person, place, and time.   Skin: Skin is warm and dry. No rash noted.   Vitals reviewed.    Significant Labs:   Recent Results (from the past 24 hour(s))   CBC auto differential    Collection Time: 05/07/17  6:45 PM   Result Value Ref Range    WBC 6.50 3.90 - 12.70 K/uL    RBC 4.34 4.00 - 5.40 M/uL    Hemoglobin 12.0 12.0 - 16.0 g/dL    Hematocrit 35.6 (L) 37.0 - 48.5 %    MCV 82 82 - 98 fL    MCH 27.6 27.0 - 31.0 pg    MCHC 33.7 32.0 - 36.0 %    RDW 13.8 11.5 - 14.5 %    Platelets 189 150 - 350 K/uL    MPV 9.2 9.2 - 12.9 fL    Gran # 5.4 1.8 - 7.7 K/uL    Lymph # 0.4 (L) 1.0 - 4.8 K/uL    Mono # 0.5 0.3 - 1.0 K/uL    Eos # 0.1 0.0 - 0.5 K/uL    Baso # 0.03 0.00 - 0.20  K/uL    Gran% 82.6 (H) 38.0 - 73.0 %    Lymph% 6.6 (L) 18.0 - 48.0 %    Mono% 8.0 4.0 - 15.0 %    Eosinophil% 2.0 0.0 - 8.0 %    Basophil% 0.5 0.0 - 1.9 %    Differential Method Automated    Comprehensive metabolic panel    Collection Time: 05/07/17  6:45 PM   Result Value Ref Range    Sodium 141 136 - 145 mmol/L    Potassium 3.6 3.5 - 5.1 mmol/L    Chloride 106 95 - 110 mmol/L    CO2 23 23 - 29 mmol/L    Glucose 119 (H) 70 - 110 mg/dL    BUN, Bld 16 6 - 20 mg/dL    Creatinine 0.7 0.5 - 1.4 mg/dL    Calcium 8.9 8.7 - 10.5 mg/dL    Total Protein 6.6 6.0 - 8.4 g/dL    Albumin 2.9 (L) 3.5 - 5.2 g/dL    Total Bilirubin 0.4 0.1 - 1.0 mg/dL    Alkaline Phosphatase 119 55 - 135 U/L    AST 19 10 - 40 U/L    ALT 14 10 - 44 U/L    Anion Gap 12 8 - 16 mmol/L    eGFR if African American >60.0 >60 mL/min/1.73 m^2    eGFR if non African American >60.0 >60 mL/min/1.73 m^2   Brain natriuretic peptide    Collection Time: 05/07/17  6:45 PM   Result Value Ref Range    BNP 51 0 - 99 pg/mL   Magnesium    Collection Time: 05/07/17  6:45 PM   Result Value Ref Range    Magnesium 1.6 1.6 - 2.6 mg/dL   Protime-INR    Collection Time: 05/07/17  6:45 PM   Result Value Ref Range    Prothrombin Time 10.4 9.0 - 12.5 sec    INR 1.0 0.8 - 1.2   Troponin I    Collection Time: 05/07/17  6:45 PM   Result Value Ref Range    Troponin I 0.307 (H) 0.000 - 0.026 ng/mL     Diagnostic Results:  CXR 05/07/17:  Right pneumothorax, with possible chest tube versus extraneous material overlying the patient, correlation is recommended. Enlarging left pleural effusion, underlying consolidation is not excluded. This is in the setting of diffuse edema with likely underlying chronic pulmonary change.     Assessment/Plan:     * Pneumothorax  - R-sided pneumothorax suspected as primary cause of worsening SOB; differential includes progression of disease given worsened L pleural effusion, however.  - Starting high-flow 100% O2 via NRB.  - Will re-evaluate pneumothorax  with repeat CXR ~2300.  - If showing evidence of worsening, will likely require chest tube placement with pulmonology.  - Appreciate pulmonology assistance.    Hyperlipidemia  - Continuing atorvastatin 40mg PO daily.    Coronary artery disease involving native coronary artery of native heart without angina pectoris  - CAD s/p stenting 2012 with NSTEMI 03/2017.  - Continuing home metoprolol 12.5mg PO BID, atorvastatin 40mg PO daily, aspirin 81mg PO daily, clopidogrel 75mg PO daily.  - Patient with some chest and jaw discomfort while in ambulance; troponin mildly elevated at 0.307. Suspect secondary to demand ischemia.  - Chest and jaw pain resolved at time of examination.  - EKG from ED demonstrates no significant change from prior.  - Will continue to monitor.    Acquired hypothyroidism  - Continuing levothyroxine 100mcg PO daily.    Gastroesophageal reflux disease with esophagitis:  see EUS 4/14  - Continuing pantoprazole 40mg PO daily.    Pancreatic adenocarcinoma  - Pancreatic adenocarcinoma treated with chemotherapy, radiation therapy followed by Dr. Nguyễn in clinic.  - Continuing pain control with methadone 5mg PO q12hr, MS-contin 30mg PO q8hr; given some worsening pain at home will increase home hydrocodone-acetaminophen to 10-325mg PO q6hr PRN with morphine 4mg IV q4hr PRN for breakthrough pain control.  - Suspect pleural effusions related to pancreatic malignancy.    Vitamin D deficiency  - Continuing vitamin D 1000U PO daily.    Type 2 diabetes mellitus without complication, without long-term current use of insulin  - Will reduce home insulin regimen slightly, using insulin detemir 2U subQ BID, insulin aspart 3U subQ TIDWM with additional low-dose sliding scale insulin as needed.    Liver metastases  - As under pancreatic adenocarcinoma.    Bone metastases  - As under pancreatic adenocarcinoma.    Pleural effusion  - L sided pleural effusion noted to be increased in size from prior CXR, 05/03.  - Given  concurrent pneumothorax of R, will defer drainage at this time.    Shortness of breath  - Gradually worsening SOB with worsening effusions, s/p thoracentesis 05/03.  - Patient using O2 at home ~2L.  - Given wheezing, will start albuterol-ipratropium 2.5-0.5mg nebulized q4hr PRN.    Staff attestation to follow.    ANJELICA Hogue MD   PGY-2   [phone]    STAFF NOTE:  I have personally taken the history and examined this patient and agree with residents Note as stated above

## 2017-05-09 LAB
ALBUMIN SERPL BCP-MCNC: 2.6 G/DL
ALP SERPL-CCNC: 116 U/L
ALT SERPL W/O P-5'-P-CCNC: 13 U/L
ANION GAP SERPL CALC-SCNC: 7 MMOL/L
ANION GAP SERPL CALC-SCNC: 7 MMOL/L
AST SERPL-CCNC: 21 U/L
BASOPHILS # BLD AUTO: 0.02 K/UL
BASOPHILS NFR BLD: 0.4 %
BILIRUB SERPL-MCNC: 0.4 MG/DL
BUN SERPL-MCNC: 10 MG/DL
BUN SERPL-MCNC: 10 MG/DL
CALCIUM SERPL-MCNC: 8.8 MG/DL
CALCIUM SERPL-MCNC: 8.8 MG/DL
CHLORIDE SERPL-SCNC: 104 MMOL/L
CHLORIDE SERPL-SCNC: 104 MMOL/L
CO2 SERPL-SCNC: 27 MMOL/L
CO2 SERPL-SCNC: 27 MMOL/L
CREAT SERPL-MCNC: 0.6 MG/DL
CREAT SERPL-MCNC: 0.6 MG/DL
DIFFERENTIAL METHOD: ABNORMAL
EOSINOPHIL # BLD AUTO: 0.3 K/UL
EOSINOPHIL NFR BLD: 5.9 %
ERYTHROCYTE [DISTWIDTH] IN BLOOD BY AUTOMATED COUNT: 14.1 %
EST. GFR  (AFRICAN AMERICAN): >60 ML/MIN/1.73 M^2
EST. GFR  (AFRICAN AMERICAN): >60 ML/MIN/1.73 M^2
EST. GFR  (NON AFRICAN AMERICAN): >60 ML/MIN/1.73 M^2
EST. GFR  (NON AFRICAN AMERICAN): >60 ML/MIN/1.73 M^2
GLUCOSE SERPL-MCNC: 128 MG/DL
GLUCOSE SERPL-MCNC: 128 MG/DL
HCT VFR BLD AUTO: 35 %
HGB BLD-MCNC: 11.7 G/DL
LDH SERPL L TO P-CCNC: 186 U/L
LYMPHOCYTES # BLD AUTO: 0.6 K/UL
LYMPHOCYTES NFR BLD: 13 %
MAGNESIUM SERPL-MCNC: 1.8 MG/DL
MCH RBC QN AUTO: 27.7 PG
MCHC RBC AUTO-ENTMCNC: 33.4 %
MCV RBC AUTO: 83 FL
MONOCYTES # BLD AUTO: 0.4 K/UL
MONOCYTES NFR BLD: 9.7 %
NEUTROPHILS # BLD AUTO: 3.2 K/UL
NEUTROPHILS NFR BLD: 70.8 %
PHOSPHATE SERPL-MCNC: 3.9 MG/DL
PLATELET # BLD AUTO: 178 K/UL
PMV BLD AUTO: 9.4 FL
POCT GLUCOSE: 102 MG/DL (ref 70–110)
POCT GLUCOSE: 182 MG/DL (ref 70–110)
POCT GLUCOSE: 79 MG/DL (ref 70–110)
POTASSIUM SERPL-SCNC: 4.3 MMOL/L
POTASSIUM SERPL-SCNC: 4.3 MMOL/L
PROT SERPL-MCNC: 6.1 G/DL
RBC # BLD AUTO: 4.22 M/UL
SODIUM SERPL-SCNC: 138 MMOL/L
SODIUM SERPL-SCNC: 138 MMOL/L
WBC # BLD AUTO: 4.55 K/UL

## 2017-05-09 PROCEDURE — 83735 ASSAY OF MAGNESIUM: CPT

## 2017-05-09 PROCEDURE — 20600001 HC STEP DOWN PRIVATE ROOM

## 2017-05-09 PROCEDURE — 25000003 PHARM REV CODE 250: Performed by: STUDENT IN AN ORGANIZED HEALTH CARE EDUCATION/TRAINING PROGRAM

## 2017-05-09 PROCEDURE — 85025 COMPLETE CBC W/AUTO DIFF WBC: CPT

## 2017-05-09 PROCEDURE — 83615 LACTATE (LD) (LDH) ENZYME: CPT

## 2017-05-09 PROCEDURE — 36415 COLL VENOUS BLD VENIPUNCTURE: CPT

## 2017-05-09 PROCEDURE — 99233 SBSQ HOSP IP/OBS HIGH 50: CPT | Mod: ,,, | Performed by: INTERNAL MEDICINE

## 2017-05-09 PROCEDURE — 25000003 PHARM REV CODE 250: Performed by: INTERNAL MEDICINE

## 2017-05-09 PROCEDURE — 84100 ASSAY OF PHOSPHORUS: CPT

## 2017-05-09 PROCEDURE — 80053 COMPREHEN METABOLIC PANEL: CPT

## 2017-05-09 PROCEDURE — 63600175 PHARM REV CODE 636 W HCPCS: Performed by: INTERNAL MEDICINE

## 2017-05-09 RX ORDER — MORPHINE SULFATE 15 MG/1
30 TABLET, FILM COATED, EXTENDED RELEASE ORAL EVERY 8 HOURS
Status: DISCONTINUED | OUTPATIENT
Start: 2017-05-09 | End: 2017-05-09

## 2017-05-09 RX ORDER — HYDROMORPHONE HYDROCHLORIDE 1 MG/ML
1 INJECTION, SOLUTION INTRAMUSCULAR; INTRAVENOUS; SUBCUTANEOUS ONCE
Status: DISCONTINUED | OUTPATIENT
Start: 2017-05-09 | End: 2017-05-10

## 2017-05-09 RX ORDER — METHADONE HYDROCHLORIDE 5 MG/1
5 TABLET ORAL EVERY 8 HOURS
Status: DISCONTINUED | OUTPATIENT
Start: 2017-05-09 | End: 2017-05-11 | Stop reason: HOSPADM

## 2017-05-09 RX ORDER — HYDROMORPHONE HYDROCHLORIDE 2 MG/1
4 TABLET ORAL EVERY 4 HOURS PRN
Status: DISCONTINUED | OUTPATIENT
Start: 2017-05-09 | End: 2017-05-10

## 2017-05-09 RX ORDER — LIDOCAINE 50 MG/G
1 PATCH TOPICAL
Status: DISCONTINUED | OUTPATIENT
Start: 2017-05-09 | End: 2017-05-11 | Stop reason: HOSPADM

## 2017-05-09 RX ORDER — POLYETHYLENE GLYCOL 3350 17 G/17G
17 POWDER, FOR SOLUTION ORAL DAILY
Status: DISCONTINUED | OUTPATIENT
Start: 2017-05-09 | End: 2017-05-11 | Stop reason: HOSPADM

## 2017-05-09 RX ORDER — HYDROCODONE BITARTRATE AND ACETAMINOPHEN 10; 325 MG/1; MG/1
1 TABLET ORAL ONCE
Status: COMPLETED | OUTPATIENT
Start: 2017-05-09 | End: 2017-05-09

## 2017-05-09 RX ORDER — HYDROCODONE BITARTRATE AND ACETAMINOPHEN 10; 325 MG/1; MG/1
1 TABLET ORAL EVERY 4 HOURS PRN
Status: DISCONTINUED | OUTPATIENT
Start: 2017-05-09 | End: 2017-05-11 | Stop reason: HOSPADM

## 2017-05-09 RX ORDER — AMOXICILLIN 250 MG
1 CAPSULE ORAL 2 TIMES DAILY
Status: DISCONTINUED | OUTPATIENT
Start: 2017-05-09 | End: 2017-05-11 | Stop reason: HOSPADM

## 2017-05-09 RX ADMIN — GABAPENTIN 300 MG: 300 CAPSULE ORAL at 09:05

## 2017-05-09 RX ADMIN — INSULIN ASPART 3 UNITS: 100 INJECTION, SOLUTION INTRAVENOUS; SUBCUTANEOUS at 07:05

## 2017-05-09 RX ADMIN — POLYETHYLENE GLYCOL 3350 17 G: 17 POWDER, FOR SOLUTION ORAL at 11:05

## 2017-05-09 RX ADMIN — PANCRELIPASE 3 CAPSULE: 60000; 12000; 38000 CAPSULE, DELAYED RELEASE PELLETS ORAL at 06:05

## 2017-05-09 RX ADMIN — PANTOPRAZOLE SODIUM 40 MG: 40 TABLET, DELAYED RELEASE ORAL at 08:05

## 2017-05-09 RX ADMIN — INSULIN ASPART 3 UNITS: 100 INJECTION, SOLUTION INTRAVENOUS; SUBCUTANEOUS at 12:05

## 2017-05-09 RX ADMIN — MORPHINE SULFATE 30 MG: 15 TABLET, EXTENDED RELEASE ORAL at 07:05

## 2017-05-09 RX ADMIN — Medication 12.5 MG: at 09:05

## 2017-05-09 RX ADMIN — SODIUM CHLORIDE 500 ML: 0.9 INJECTION, SOLUTION INTRAVENOUS at 11:05

## 2017-05-09 RX ADMIN — STANDARDIZED SENNA CONCENTRATE AND DOCUSATE SODIUM 1 TABLET: 8.6; 5 TABLET, FILM COATED ORAL at 09:05

## 2017-05-09 RX ADMIN — MORPHINE SULFATE 15 MG: 15 TABLET ORAL at 04:05

## 2017-05-09 RX ADMIN — MORPHINE SULFATE 4 MG: 2 INJECTION, SOLUTION INTRAMUSCULAR; INTRAVENOUS at 01:05

## 2017-05-09 RX ADMIN — GABAPENTIN 300 MG: 300 CAPSULE ORAL at 05:05

## 2017-05-09 RX ADMIN — ATORVASTATIN CALCIUM 40 MG: 20 TABLET, FILM COATED ORAL at 09:05

## 2017-05-09 RX ADMIN — HYDROMORPHONE HYDROCHLORIDE 4 MG: 2 TABLET ORAL at 09:05

## 2017-05-09 RX ADMIN — PANCRELIPASE 3 CAPSULE: 60000; 12000; 38000 CAPSULE, DELAYED RELEASE PELLETS ORAL at 07:05

## 2017-05-09 RX ADMIN — HYDROCODONE BITARTRATE AND ACETAMINOPHEN 1 TABLET: 10; 325 TABLET ORAL at 11:05

## 2017-05-09 RX ADMIN — METHADONE HYDROCHLORIDE 5 MG: 5 TABLET ORAL at 09:05

## 2017-05-09 RX ADMIN — GABAPENTIN 300 MG: 300 CAPSULE ORAL at 02:05

## 2017-05-09 RX ADMIN — STANDARDIZED SENNA CONCENTRATE AND DOCUSATE SODIUM 1 TABLET: 8.6; 5 TABLET, FILM COATED ORAL at 11:05

## 2017-05-09 RX ADMIN — ENOXAPARIN SODIUM 40 MG: 100 INJECTION SUBCUTANEOUS at 04:05

## 2017-05-09 RX ADMIN — INSULIN ASPART 3 UNITS: 100 INJECTION, SOLUTION INTRAVENOUS; SUBCUTANEOUS at 06:05

## 2017-05-09 RX ADMIN — METHADONE HYDROCHLORIDE 5 MG: 5 TABLET ORAL at 02:05

## 2017-05-09 RX ADMIN — METHADONE HYDROCHLORIDE 5 MG: 5 TABLET ORAL at 08:05

## 2017-05-09 RX ADMIN — ASPIRIN 81 MG: 81 TABLET, COATED ORAL at 08:05

## 2017-05-09 RX ADMIN — MORPHINE SULFATE 4 MG: 2 INJECTION, SOLUTION INTRAMUSCULAR; INTRAVENOUS at 05:05

## 2017-05-09 RX ADMIN — HYDROCODONE BITARTRATE AND ACETAMINOPHEN 1 TABLET: 10; 325 TABLET ORAL at 04:05

## 2017-05-09 RX ADMIN — INSULIN DETEMIR 2 UNITS: 100 INJECTION, SOLUTION SUBCUTANEOUS at 09:05

## 2017-05-09 RX ADMIN — LEVOTHYROXINE SODIUM 100 MCG: 100 TABLET ORAL at 05:05

## 2017-05-09 RX ADMIN — PANCRELIPASE 3 CAPSULE: 60000; 12000; 38000 CAPSULE, DELAYED RELEASE PELLETS ORAL at 12:05

## 2017-05-09 RX ADMIN — LIDOCAINE 1 PATCH: 50 PATCH TOPICAL at 11:05

## 2017-05-09 RX ADMIN — HYDROCODONE BITARTRATE AND ACETAMINOPHEN 1 TABLET: 10; 325 TABLET ORAL at 08:05

## 2017-05-09 RX ADMIN — VITAMIN D, TAB 1000IU (100/BT) 1000 UNITS: 25 TAB at 08:05

## 2017-05-09 RX ADMIN — INSULIN DETEMIR 2 UNITS: 100 INJECTION, SOLUTION SUBCUTANEOUS at 08:05

## 2017-05-09 NOTE — PROGRESS NOTES
Notified resident on call about manual bp of 88/56. Machine bp showed 89/54. Orders to give pt 500cc bolus.

## 2017-05-09 NOTE — PROGRESS NOTES
Hematology/Oncology   Progress Note                                                              Team: Networked reference to record PCT     Patient Name: Lashay Whitley  YOB: 1960    Admit Date: 5/7/2017                     LOS: 2    SUBJECTIVE:     56 y/o F with h/o breast cancer (2000 s/p bilateral mastectomy and chemo), pancreatic cancer (dx: with recurrence (s/p Whipple), CAD (s/p PCI x2 in 2012; NSTEMI in Mar 2017), HLD, Hypothyroidism, T2DM who presents to the ED with complaints of SOB with associated chest pain    Brief oncologic history:   H/o of breast cancer - 2000 s/p bilateral mastectomy + chemo; in remission since  H/o of pancreatic cancer (ductal adenocarcinoma)- diagnosed 2012 - s/p folforinox x 4 cycles + whipple + adjuvant gemcitabine which was d/c due to poor tolerance   - mets to liver: 11/22/16 - biopsied   - mets to bone (T-spine) - received radiation  Pt of Dr. Almonte    Reason for Admission:  Pneumothorax  See H&P for detailed initial presentation history and ROS.      Interval history:   Since yesterday, underwent Left thoracentesis - 1.1L serous output; appears exudative   CRA r/o PE  Post thora CXR show persistent pneumothorax vs. Trapped lung on left    O/n had drop in BP 88/50s - patient asymptomatic but required 500cc IVF bolus  Complains pain not well controlled on morphine IR but no complains from the respiratory side - no SOBOE    OBJECTIVE:     Vital Signs Range (Last 24H):  Temp:  [97.3 °F (36.3 °C)-98.6 °F (37 °C)]   Pulse:  [55-85]   Resp:  [16-18]   BP: ()/(48-65)   SpO2:  [95 %-100 %] Body mass index is 20 kg/(m^2).  Wt Readings from Last 1 Encounters:   05/07/17 1809 44.9 kg (99 lb)       I & O (Last 24H):    Intake/Output Summary (Last 24 hours) at 05/09/17 1221  Last data filed at 05/09/17 0837   Gross per 24 hour   Intake              360 ml   Output                0 ml   Net              360 ml     Physical Exam:  General: well developed, no  distress  Eyes: conjunctivae/corneas clear. PERRL..  HENT: Head:normocephalic, atraumatic.  Neck: supple, symmetrical, trachea midline, no JVD and thyroid not enlarged  Lungs: Diffuse expiratory wheezes on right and left. Diminished breath sounds on right lung base  Cardiovascular: Heart: regular rate and rhythm, S1, S2 normal, no murmur, click, rub or gallop. Pulses: 2+ and symmetric.  Abdomen: soft, non-tender non-distented; bowel sounds normal; no masses,  no organomegaly.   Skin: Skin color, texture, turgor normal. No rashes or lesion  Neurologic: Normal strength and tone. No focal numbness or weakness  Psych/Behavioral:  Alert and oriented x3.    Diagnostic Results:  Lab Results   Component Value Date    WBC 4.55 05/09/2017    HGB 11.7 (L) 05/09/2017    HCT 35.0 (L) 05/09/2017    MCV 83 05/09/2017     05/09/2017       Recent Labs  Lab 05/09/17  0450   *  128*     138   K 4.3  4.3     104   CO2 27  27   BUN 10  10   CREATININE 0.6  0.6   CALCIUM 8.8  8.8   MG 1.8     Lab Results   Component Value Date    INR 1.0 05/07/2017    INR 1.0 03/08/2017    INR 1.0 12/06/2016     Lab Results   Component Value Date    HGBA1C 5.7 05/08/2017     Recent Labs      05/07/17   2149  05/08/17   0733  05/08/17   0816  05/09/17   0735   POCTGLUCOSE  169*  69*  119*  79     CXR (5/7/17): Right pneumothorax, with possible chest tube versus extraneous material overlying the patient, correlation is recommended.    ASSESSMENT/PLAN:   56 y/o F with h/o breast cancer (2000 s/p bilateral mastectomy and chemo), pancreatic cancer (dx: with recurrence (s/p Whipple), CAD (s/p PCI x2 in 2012; NSTEMI in Mar 2017), HLD, Hypothyroidism, T2DM who presents to the ED with complaints of SOB with associated chest pain.     SOB  Pneumothorax - on right lung  Pleural effusion - on left lung  - SOB 2/2 pneumothorax vs. Pleural effusion; both likely from progressive malignancy  - clinically, patient feels more relief c/w  initial presentaiton  - per Pulm recs, CTA to r/o PE  - pneumothorax on right lung vs trapped lung s/p chest tube placement 5/7/17; will monitor CT output q24h. Appreciate Pulm assistance  - pleural effusion   - h/o b/l pleural effusion noted on CT Chest 2/27/17   - s/p thoracentesis on right on 5/3/17. Effusion from that tap consistent for exudative effusion per Lights Criteria   - Effusion on left noted to be increased on admission 5/7/17 c/w CXR from 5/3/17 - s/p thoracentesis on left on 5/8/17. 1.1L serous fluid drained and exudative per lights criteria.    - Pulm on board- recommend using NRB to help w/ N2 in pneumothorax. Will keep NRB and await further recs    Pancreatic cancer (Ductal carcinoma, stage 4)  Bone metastases  Liver metastases  - w/ mets to liver, bone and lung (pleural effusion = exudative; likely malignancy)  - pain management: at home consists of methadone 5mg BID, MS contin TID, neurontin 300mg TID   - confirmed with primary oncologist regarding use of MS contin & methadone to confirm patient was intentionally placed on both, but given risk of using 2+ long-acting analgesics, convering MS contin to methadone & providing breakthrough pain relief   - Methadone 5mg TID for basal pain control + percocet  q4h moderate pain prn + + Hydromorphone 4mg q4h prn severe pain. Will continue to monitor and titrate as necessary to keep patient comfortable   - in addition, lidocaine patches provided + heat/ice packs  - nausea: zoFran 8mg q8h prn & IV zofran 4mg q8h prn  - continue creon tabs (lipase-protease-amylase) tabs order TIDWM     CAD   - s/p PCI 2012  - NSTEMI on 3/8/17 was managed medically without intervention.  - on presentation had chest pain + SOB which has now resolved. Troponin: 0.3 and ECG NSR. If patient was still symptomatic of chest pain, then would repeat troponin to see if downtrended but given lack of clinical symptoms, no need to repeat at this time.   - was on ASA, plavix,  atorvastatin, metoprolol 12.5 BID  - LVEF was 50%, preserved.  - No ACEI because of low blood pressure.  - spoke to Cardiology fellow (Dr. Stanton) and confirmed that plavix can be discontinued at this time. Plavix stopped 5/8/17    Hyperlipidemia  - ASCVD: 3.9% based on lipid panel 9/13/17  - continue atorvastatin 40mg qD    T2DM  - h/o T2DM on insulin 2 detemir  + 3 U aspart TIDWM  - however, HbA1c 5.7 today. Given h/o pancreatic cancer, insulin/glucagon levels may vary  - will continue to watch POCT AC&HS    Gastroesophageal reflux disease with esophagitis per EUS 4/14  - continue home protonix daily    Hypothyroidism  - continue home levothyroxine 100mcg qD    Diet: regular diet  DVT: enoxaparin  Full code    DIspo: Optimizing pain regimen & respiratory status  For pneumothorax - continuing O2 via NRB. Await further recs re: transition to HFNC    Patient seen and plan of care discussed with Dr. Julissa Morales MD  Internal Medicine, PGY-1  [phone]      STAFF NOTE:  I have personally taken the history and examined this patient and agree with residents Note as stated above

## 2017-05-09 NOTE — PLAN OF CARE
Problem: Patient Care Overview  Goal: Plan of Care Review  Outcome: Ongoing (interventions implemented as appropriate)  Pt on high flow weaned down to 7L/min oxygen. sats %. C/o pain to back and left shoulder/arm.  Morphine IV and PO available for pain prn. Up with standby assist. Continued on telemetry. Call placed to resident on call regarding pts home pain med regimen being changed.

## 2017-05-09 NOTE — PROGRESS NOTES
Pulmonary Medicine  Daily Progress Note    Interval History:    Underwent thoracentesis yesterday with appearance of trapped lung on the left. The patient was placed on NRB and the lungs remain largely inflated. She reports significant improvement in her breathing this morning. She is on NC during this morning evaluation. She reports some continued pain, but states that this is also well controlled with her current pain regimen.    Objective:     Temp:  [97.3 °F (36.3 °C)-98.6 °F (37 °C)] 98.6 °F (37 °C)  Pulse:  [55-85] 72  Resp:  [16-18] 16  SpO2:  [97 %-100 %] 100 %  BP: ()/(54-65) 98/58     GEN: WD, thin, ill appearing, NAD, cooperative, appropriate  HEENT: NC/AT, PERRL, EOMI, no icterus/injection, no pharyngeal edema/erythema, MMM  NECK: Supple, trachea midline, no JVD, thyromegaly, no LAD  CVS: S1 and S2 audible, RRR, no MRG appreciated  RESP: Symmetric excursion, breath sounds are heard B/L, faint wheezing  ABD: BSx4, soft, NT/ND, no masses, no HSM  EXT: Palpable and symmetric pulses, no CCE  SKIN: Warm, moist, no lesions, rashes, breakdown  NEURO: AAOx3, CNII-XII intact and symmetric, 5/5 strength in B/L UE and LE    Labs:  - Exudative effusion by protein criteria     CXR: Largely re-inflated lungs B/L, small apical L PTX  CT Chest: Extensive GGO and parenchymal opacities and peribronchial thickening     Assessment and Recommendations    Bilateral Malignant Pleural Effusions   Pancreatic Cancer  Trapped Lungs B/L s/p Thoracentesis   Suspected Lymphangitic Spread     - Symptomatically improved with L thoracentesis  - O2 requirements remain elevated and suspect this is related to lymphangitic spread of hr cancer   - CXR stable, no chest tubes needed at this time   - The patient would like to have pleural catheter placed; will perform as outpatient   - Please stop plavix now    Dread Kearns MD  Fellow, [facility]  Pager: [phone]    5/9/2017  4:51 PM

## 2017-05-09 NOTE — PROGRESS NOTES
"57 year old with widely metastatic pancreatic cancer.  I was initially consulted outpatient for comfort measures only so that patient could get to her daughter's wedding June 16.  Thoracentesis on right did not provide relief and lungs did not reexpand indicating trapped lung.  However, patient did get relief from left sided thoracentesis although lung did not reexpand and fluid rapidly accumulated.  Over 30 minute discussion regarding end of life decisions and plan of care.    Patient complains of diffuse pain and would like to be made comfortable.  She also voiced that she understood that she was "terminal" and "dying" and that she was too sick for treatment although she was approved for tarceeva?    Patient's vitals remain stable and she is in no respiratory distress.    Over 30 minute discussion regarding comfort measures.  I have also explained that a tunneled pleural catheter should be placed only on the left if she chooses hospice.  It will not prolong her life but may give her relief from dyspnea.  Unfortunately, I believe that she not only has metastasis to her pleura but also lymphangitic spread.    She would like to confirm a plan with oncology before moving forward.  Discontinue Plavix and we can schedule tunneled catheter as an outpatient.  "

## 2017-05-09 NOTE — PROGRESS NOTES
05/09/17 1125 05/09/17 1128   Vital Signs   BP (!) 85/51 (!) 78/48     Notified [facility] regarding pt's BP; pt asymptomatic. 500 cc bolus ordered. Will hold off on administering scheduled 1 mg dilaudid. Will monitor.

## 2017-05-09 NOTE — PLAN OF CARE
Problem: Patient Care Overview  Goal: Plan of Care Review  Outcome: Ongoing (interventions implemented as appropriate)    05/09/17 8470   Coping/Psychosocial   Plan Of Care Reviewed With patient;spouse      Pt resting comfortably and involved in plan of care. Pt with continued pain to left shoulder/back that radiates down left arm. Norco po given for breakthrough; methadone scheduled q 8 hours and MS contin discontinued. PRN dilaudid available but pt has intolerance from previous cancer treatment and prefers to use norco first. Miralax and senna administered. Lidocaine patch placed to left back. Pt transitioned from NRB to HF N/C at 5 LPM. 500 cc bolus administered for low BPs. Tele monitoring continued. Accuchecks preformed pre meal; coverage given per orders. Appetite excellent. Pt,  and daughter discussing further care. Requested  to see patient. Pt has remained free from injury this shift. Bed in low locked position. Call light and personal belongings within reach. Side rails up x2. Wearing nonskid socks. VSS. All questions answered. Will continue to monitor.

## 2017-05-09 NOTE — PROGRESS NOTES
Pt placed back on non rebreather mask at 100% per pulmonology MD. Pt was on 5L high flow N/C at 97%; pt now satting 100% on NRB. Will monitor.

## 2017-05-10 LAB
ANION GAP SERPL CALC-SCNC: 8 MMOL/L
BASOPHILS # BLD AUTO: 0.03 K/UL
BASOPHILS NFR BLD: 0.6 %
BUN SERPL-MCNC: 9 MG/DL
CALCIUM SERPL-MCNC: 8.6 MG/DL
CHLORIDE SERPL-SCNC: 103 MMOL/L
CO2 SERPL-SCNC: 28 MMOL/L
CREAT SERPL-MCNC: 0.6 MG/DL
CRP SERPL-MCNC: 24.95 MG/L
DIFFERENTIAL METHOD: ABNORMAL
EOSINOPHIL # BLD AUTO: 0.3 K/UL
EOSINOPHIL NFR BLD: 6.5 %
ERYTHROCYTE [DISTWIDTH] IN BLOOD BY AUTOMATED COUNT: 13.8 %
ERYTHROCYTE [SEDIMENTATION RATE] IN BLOOD BY WESTERGREN METHOD: 38 MM/HR
EST. GFR  (AFRICAN AMERICAN): >60 ML/MIN/1.73 M^2
EST. GFR  (NON AFRICAN AMERICAN): >60 ML/MIN/1.73 M^2
GLUCOSE SERPL-MCNC: 144 MG/DL
HCT VFR BLD AUTO: 32.3 %
HGB BLD-MCNC: 10.9 G/DL
LYMPHOCYTES # BLD AUTO: 0.7 K/UL
LYMPHOCYTES NFR BLD: 15.3 %
MAGNESIUM SERPL-MCNC: 1.3 MG/DL
MCH RBC QN AUTO: 27.9 PG
MCHC RBC AUTO-ENTMCNC: 33.7 %
MCV RBC AUTO: 83 FL
MONOCYTES # BLD AUTO: 0.6 K/UL
MONOCYTES NFR BLD: 11.8 %
NEUTROPHILS # BLD AUTO: 3.1 K/UL
NEUTROPHILS NFR BLD: 65.4 %
PHOSPHATE SERPL-MCNC: 3.5 MG/DL
PLATELET # BLD AUTO: 181 K/UL
PMV BLD AUTO: 9.2 FL
POCT GLUCOSE: 102 MG/DL (ref 70–110)
POCT GLUCOSE: 108 MG/DL (ref 70–110)
POCT GLUCOSE: 187 MG/DL (ref 70–110)
POCT GLUCOSE: 74 MG/DL (ref 70–110)
POCT GLUCOSE: 94 MG/DL (ref 70–110)
POTASSIUM SERPL-SCNC: 4.5 MMOL/L
RBC # BLD AUTO: 3.9 M/UL
SODIUM SERPL-SCNC: 139 MMOL/L
WBC # BLD AUTO: 4.76 K/UL

## 2017-05-10 PROCEDURE — 85025 COMPLETE CBC W/AUTO DIFF WBC: CPT

## 2017-05-10 PROCEDURE — 27100171 HC OXYGEN HIGH FLOW UP TO 24 HOURS

## 2017-05-10 PROCEDURE — 94640 AIRWAY INHALATION TREATMENT: CPT

## 2017-05-10 PROCEDURE — 25000003 PHARM REV CODE 250: Performed by: INTERNAL MEDICINE

## 2017-05-10 PROCEDURE — 85651 RBC SED RATE NONAUTOMATED: CPT

## 2017-05-10 PROCEDURE — 86141 C-REACTIVE PROTEIN HS: CPT

## 2017-05-10 PROCEDURE — 99233 SBSQ HOSP IP/OBS HIGH 50: CPT | Mod: ,,, | Performed by: INTERNAL MEDICINE

## 2017-05-10 PROCEDURE — 80048 BASIC METABOLIC PNL TOTAL CA: CPT

## 2017-05-10 PROCEDURE — 63600175 PHARM REV CODE 636 W HCPCS: Performed by: INTERNAL MEDICINE

## 2017-05-10 PROCEDURE — 25000003 PHARM REV CODE 250: Performed by: STUDENT IN AN ORGANIZED HEALTH CARE EDUCATION/TRAINING PROGRAM

## 2017-05-10 PROCEDURE — 94761 N-INVAS EAR/PLS OXIMETRY MLT: CPT

## 2017-05-10 PROCEDURE — 84100 ASSAY OF PHOSPHORUS: CPT

## 2017-05-10 PROCEDURE — 63600175 PHARM REV CODE 636 W HCPCS: Performed by: STUDENT IN AN ORGANIZED HEALTH CARE EDUCATION/TRAINING PROGRAM

## 2017-05-10 PROCEDURE — 20600001 HC STEP DOWN PRIVATE ROOM

## 2017-05-10 PROCEDURE — 27000221 HC OXYGEN, UP TO 24 HOURS

## 2017-05-10 PROCEDURE — 83735 ASSAY OF MAGNESIUM: CPT

## 2017-05-10 PROCEDURE — 36415 COLL VENOUS BLD VENIPUNCTURE: CPT

## 2017-05-10 PROCEDURE — 25000242 PHARM REV CODE 250 ALT 637 W/ HCPCS: Performed by: INTERNAL MEDICINE

## 2017-05-10 RX ORDER — DIPHENHYDRAMINE HCL 25 MG
25 CAPSULE ORAL ONCE
Status: DISCONTINUED | OUTPATIENT
Start: 2017-05-10 | End: 2017-05-10

## 2017-05-10 RX ORDER — HYDROMORPHONE HYDROCHLORIDE 2 MG/1
4 TABLET ORAL
Status: DISCONTINUED | OUTPATIENT
Start: 2017-05-10 | End: 2017-05-11 | Stop reason: HOSPADM

## 2017-05-10 RX ORDER — METHYLPREDNISOLONE SOD SUCC 125 MG
125 VIAL (EA) INJECTION ONCE
Status: COMPLETED | OUTPATIENT
Start: 2017-05-10 | End: 2017-05-10

## 2017-05-10 RX ORDER — HYDROMORPHONE HYDROCHLORIDE 1 MG/ML
1 INJECTION, SOLUTION INTRAMUSCULAR; INTRAVENOUS; SUBCUTANEOUS ONCE
Status: COMPLETED | OUTPATIENT
Start: 2017-05-10 | End: 2017-05-10

## 2017-05-10 RX ORDER — MAGNESIUM SULFATE HEPTAHYDRATE 40 MG/ML
2 INJECTION, SOLUTION INTRAVENOUS
Status: COMPLETED | OUTPATIENT
Start: 2017-05-10 | End: 2017-05-10

## 2017-05-10 RX ADMIN — STANDARDIZED SENNA CONCENTRATE AND DOCUSATE SODIUM 1 TABLET: 8.6; 5 TABLET, FILM COATED ORAL at 08:05

## 2017-05-10 RX ADMIN — HYDROMORPHONE HYDROCHLORIDE 4 MG: 2 TABLET ORAL at 03:05

## 2017-05-10 RX ADMIN — GABAPENTIN 300 MG: 300 CAPSULE ORAL at 01:05

## 2017-05-10 RX ADMIN — INSULIN DETEMIR 2 UNITS: 100 INJECTION, SOLUTION SUBCUTANEOUS at 09:05

## 2017-05-10 RX ADMIN — PANTOPRAZOLE SODIUM 40 MG: 40 TABLET, DELAYED RELEASE ORAL at 08:05

## 2017-05-10 RX ADMIN — METHYLPREDNISOLONE SODIUM SUCCINATE 125 MG: 125 INJECTION, POWDER, FOR SOLUTION INTRAMUSCULAR; INTRAVENOUS at 02:05

## 2017-05-10 RX ADMIN — ASPIRIN 81 MG: 81 TABLET, COATED ORAL at 08:05

## 2017-05-10 RX ADMIN — IPRATROPIUM BROMIDE AND ALBUTEROL SULFATE 3 ML: .5; 3 SOLUTION RESPIRATORY (INHALATION) at 10:05

## 2017-05-10 RX ADMIN — HYDROMORPHONE HYDROCHLORIDE 4 MG: 2 TABLET ORAL at 09:05

## 2017-05-10 RX ADMIN — METHADONE HYDROCHLORIDE 5 MG: 5 TABLET ORAL at 01:05

## 2017-05-10 RX ADMIN — PANCRELIPASE 3 CAPSULE: 60000; 12000; 38000 CAPSULE, DELAYED RELEASE PELLETS ORAL at 08:05

## 2017-05-10 RX ADMIN — MAGNESIUM SULFATE HEPTAHYDRATE 2 G: 40 INJECTION, SOLUTION INTRAVENOUS at 08:05

## 2017-05-10 RX ADMIN — PANCRELIPASE 3 CAPSULE: 60000; 12000; 38000 CAPSULE, DELAYED RELEASE PELLETS ORAL at 06:05

## 2017-05-10 RX ADMIN — HYDROCODONE BITARTRATE AND ACETAMINOPHEN 1 TABLET: 10; 325 TABLET ORAL at 08:05

## 2017-05-10 RX ADMIN — PANCRELIPASE 3 CAPSULE: 60000; 12000; 38000 CAPSULE, DELAYED RELEASE PELLETS ORAL at 01:05

## 2017-05-10 RX ADMIN — INSULIN ASPART 3 UNITS: 100 INJECTION, SOLUTION INTRAVENOUS; SUBCUTANEOUS at 08:05

## 2017-05-10 RX ADMIN — LIDOCAINE 1 PATCH: 50 PATCH TOPICAL at 10:05

## 2017-05-10 RX ADMIN — INSULIN ASPART 3 UNITS: 100 INJECTION, SOLUTION INTRAVENOUS; SUBCUTANEOUS at 06:05

## 2017-05-10 RX ADMIN — ENOXAPARIN SODIUM 40 MG: 100 INJECTION SUBCUTANEOUS at 04:05

## 2017-05-10 RX ADMIN — INSULIN DETEMIR 2 UNITS: 100 INJECTION, SOLUTION SUBCUTANEOUS at 08:05

## 2017-05-10 RX ADMIN — HYDROCODONE BITARTRATE AND ACETAMINOPHEN 1 TABLET: 10; 325 TABLET ORAL at 04:05

## 2017-05-10 RX ADMIN — GABAPENTIN 300 MG: 300 CAPSULE ORAL at 05:05

## 2017-05-10 RX ADMIN — GABAPENTIN 300 MG: 300 CAPSULE ORAL at 09:05

## 2017-05-10 RX ADMIN — METHADONE HYDROCHLORIDE 5 MG: 5 TABLET ORAL at 09:05

## 2017-05-10 RX ADMIN — METHADONE HYDROCHLORIDE 5 MG: 5 TABLET ORAL at 05:05

## 2017-05-10 RX ADMIN — VITAMIN D, TAB 1000IU (100/BT) 1000 UNITS: 25 TAB at 08:05

## 2017-05-10 RX ADMIN — Medication 12.5 MG: at 08:05

## 2017-05-10 RX ADMIN — HYDROMORPHONE HYDROCHLORIDE 4 MG: 2 TABLET ORAL at 10:05

## 2017-05-10 RX ADMIN — HYDROMORPHONE HYDROCHLORIDE 1 MG: 1 INJECTION, SOLUTION INTRAMUSCULAR; INTRAVENOUS; SUBCUTANEOUS at 12:05

## 2017-05-10 RX ADMIN — LEVOTHYROXINE SODIUM 100 MCG: 100 TABLET ORAL at 05:05

## 2017-05-10 RX ADMIN — MAGNESIUM SULFATE HEPTAHYDRATE 2 G: 40 INJECTION, SOLUTION INTRAVENOUS at 10:05

## 2017-05-10 RX ADMIN — STANDARDIZED SENNA CONCENTRATE AND DOCUSATE SODIUM 1 TABLET: 8.6; 5 TABLET, FILM COATED ORAL at 09:05

## 2017-05-10 RX ADMIN — HYDROMORPHONE HYDROCHLORIDE 4 MG: 2 TABLET ORAL at 06:05

## 2017-05-10 RX ADMIN — SODIUM CHLORIDE 500 ML: 0.9 INJECTION, SOLUTION INTRAVENOUS at 02:05

## 2017-05-10 RX ADMIN — HYDROCODONE BITARTRATE AND ACETAMINOPHEN 1 TABLET: 10; 325 TABLET ORAL at 12:05

## 2017-05-10 RX ADMIN — INSULIN ASPART 3 UNITS: 100 INJECTION, SOLUTION INTRAVENOUS; SUBCUTANEOUS at 01:05

## 2017-05-10 NOTE — PLAN OF CARE
Problem: Patient Care Overview  Goal: Plan of Care Review  Outcome: Ongoing (interventions implemented as appropriate)  Pt on high flow weaned down to 5L/min oxygen. sats %. C/o pain to back and left shoulder/arm. Dilaudid and norco available for pain prn. Up with standby assist. Continued on telemetry. Educated pt on fall risk. Non skid socks applied. Bed low and locked. Call light in reach. Encouraged calling nurse for assistancePlan of care reviewed with pt. Progressing towards discharge

## 2017-05-10 NOTE — PROGRESS NOTES
Followed up with patient and . He stated that they would be agreeable to talk with a representative from hospice. Reached out to [facility] and spoke with Dara. She will contact the  to set up a time to meet with him while in the hospital.

## 2017-05-10 NOTE — PROGRESS NOTES
Hematology/Oncology   Progress Note                                                              Team: Networked reference to record PCT     Patient Name: Lashay Whitley  YOB: 1960    Admit Date: 5/7/2017                     LOS: 3    SUBJECTIVE:     56 y/o F with h/o breast cancer (2000 s/p bilateral mastectomy and chemo), pancreatic cancer (dx: with recurrence (s/p Whipple), CAD (s/p PCI x2 in 2012; NSTEMI in Mar 2017), HLD, Hypothyroidism, T2DM who presents to the ED with complaints of SOB with associated chest pain    Brief oncologic history:   H/o of breast cancer - 2000 s/p bilateral mastectomy + chemo; in remission since  H/o of pancreatic cancer (ductal adenocarcinoma)- diagnosed 2012 - s/p folforinox x 4 cycles + whipple + adjuvant gemcitabine which was d/c due to poor tolerance   - mets to liver: 11/22/16 - biopsied   - mets to bone (T-spine) - received radiation  Pt of Dr. Almonte    Reason for Admission:  Pneumothorax  See H&P for detailed initial presentation history and ROS.      Interval history:   Since yesterday, underwent Left thoracentesis - 1.1L serous output; appears exudative   CRA r/o PE  Post thora CXR show persistent pneumothorax vs. Trapped lung on left    O/n had drop in BP 88/50s - patient asymptomatic but required 500cc IVF bolus  Complains pain not well controlled on morphine IR but no complains from the respiratory side - no SOBOE    OBJECTIVE:     Vital Signs Range (Last 24H):  Temp:  [97.6 °F (36.4 °C)-98.1 °F (36.7 °C)]   Pulse:  [51-67]   Resp:  [16-18]   BP: ()/(48-68)   SpO2:  [95 %-100 %] Body mass index is 20 kg/(m^2).  Wt Readings from Last 1 Encounters:   05/07/17 1809 44.9 kg (99 lb)       I & O (Last 24H):    Intake/Output Summary (Last 24 hours) at 05/10/17 0931  Last data filed at 05/09/17 1300   Gross per 24 hour   Intake              420 ml   Output                0 ml   Net              420 ml     Physical Exam:  General: well developed, no  distress  Eyes: conjunctivae/corneas clear. PERRL..  HENT: Head:normocephalic, atraumatic.  Neck: supple, symmetrical, trachea midline, no JVD and thyroid not enlarged  Lungs: Diffuse expiratory wheezes on right and left. Diminished breath sounds on right lung base  Cardiovascular: Heart: regular rate and rhythm, S1, S2 normal, no murmur, click, rub or gallop. Pulses: 2+ and symmetric.  Abdomen: soft, non-tender non-distented; bowel sounds normal; no masses,  no organomegaly.   Skin: Skin color, texture, turgor normal. No rashes or lesion  Neurologic: Normal strength and tone. No focal numbness or weakness  Psych/Behavioral:  Alert and oriented x3.    Diagnostic Results:  Lab Results   Component Value Date    WBC 4.76 05/10/2017    HGB 10.9 (L) 05/10/2017    HCT 32.3 (L) 05/10/2017    MCV 83 05/10/2017     05/10/2017       Recent Labs  Lab 05/10/17  0515   *      K 4.5      CO2 28   BUN 9   CREATININE 0.6   CALCIUM 8.6*   MG 1.3*     Lab Results   Component Value Date    INR 1.0 05/07/2017    INR 1.0 03/08/2017    INR 1.0 12/06/2016     Lab Results   Component Value Date    HGBA1C 5.7 05/08/2017     Recent Labs      05/07/17   2149  05/08/17   0733  05/08/17   0816  05/09/17   0735  05/09/17   1832  05/09/17   2157  05/10/17   0839   POCTGLUCOSE  169*  69*  119*  79  102  182*  94     CXR (5/7/17): Right pneumothorax, with possible chest tube versus extraneous material overlying the patient, correlation is recommended.    CXR (5/10/17): no interval change     ASSESSMENT/PLAN:   56 y/o F with h/o breast cancer (2000 s/p bilateral mastectomy and chemo), pancreatic cancer (dx: with recurrence (s/p Whipple), CAD (s/p PCI x2 in 2012; NSTEMI in Mar 2017), HLD, Hypothyroidism, T2DM who presents to the ED with complaints of SOB with associated chest pain.     SOB  Pneumothorax - on right lung  Pleural effusion - on left lung  - SOB 2/2 pneumothorax vs. Pleural effusion; both likely from progressive  malignancy  - clinically, patient feels more relief c/w initial presentaiton  - per Pulm recs, CTA to r/o PE  - pneumothorax on right lung vs trapped lung s/p chest tube placement 5/7/17; will monitor CT output q24h. Appreciate Pulm assistance  - pleural effusion   - h/o b/l pleural effusion noted on CT Chest 2/27/17   - s/p thoracentesis on right on 5/3/17. Effusion from that tap consistent for exudative effusion per Lights Criteria   - Effusion on left noted to be increased on admission 5/7/17 c/w CXR from 5/3/17 - s/p thoracentesis on left on 5/8/17. 1.1L serous fluid drained and exudative per lights criteria.    - Pulm on board- recommend using NRB on 5/8 to help w/ N2 in pneumothorax.Switched over to HFNC and overnight doing better. Currently on 3L.on regular nasal cannula.    - plan for pleurex catheter to be placed by Dr. Metcalf in outpatient clinic, as early as Fri/Mon based on when patient is discharged    Pancreatic cancer (Ductal carcinoma, stage 4)  Bone metastases  Liver metastases  - w/ mets to liver, bone and lung (pleural effusion = exudative; likely malignancy)  - pain management: at home consists of methadone 5mg BID, MS contin TID, neurontin 300mg TID   - confirmed with primary oncologist regarding use of MS contin & methadone to confirm patient was intentionally placed on both, but given risk of using 2+ long-acting analgesics, convering MS contin to methadone & providing breakthrough pain relief   - Methadone 5mg TID for basal pain control + percocet  q4h moderate pain prn + + Hydromorphone 4mg q4h prn severe pain. Will continue to monitor and titrate as necessary to keep patient comfortable   - in addition, lidocaine patches provided + heat/ice packs  - nausea: zoFran 8mg q8h prn & IV zofran 4mg q8h prn  - continue creon tabs (lipase-protease-amylase) tabs order TIDWM     CAD   - s/p PCI 2012  - NSTEMI on 3/8/17 was managed medically without intervention.  - on presentation had chest pain +  SOB which has now resolved. Troponin: 0.3 and ECG NSR. If patient was still symptomatic of chest pain, then would repeat troponin to see if downtrended but given lack of clinical symptoms, no need to repeat at this time.   - was on ASA, plavix, atorvastatin, metoprolol 12.5 BID  - LVEF was 50%, preserved.  - No ACEI because of low blood pressure.  - spoke to Cardiology fellow (Dr. Stanton) and confirmed that plavix can be discontinued at this time. Plavix stopped 5/8/17    Hyperlipidemia  - ASCVD: 3.9% based on lipid panel 9/13/17  - continue atorvastatin 40mg qD    T2DM  - h/o T2DM on insulin 2 detemir  + 3 U aspart TIDWM  - however, HbA1c 5.7 today. Given h/o pancreatic cancer, insulin/glucagon levels may vary  - will continue to watch POCT AC&HS    Gastroesophageal reflux disease with esophagitis per EUS 4/14  - continue home protonix daily    Hypothyroidism  - continue home levothyroxine 100mcg qD    Diet: regular diet  DVT: enoxaparin  Full code    DIspo: Optimizing pain regimen & respiratory status  Will consider discharge home today/tomorrow after pain is optimally controlled  Plan for pleurex catheter to be placed by Dr. Metcalf in outpatient Pulm clinic, as early as Fri/Mon based on when patient is discharged. Then get hospice on board to keep patient comfortable    Patient seen and plan of care discussed with Dr. Julissa Morales MD  Internal Medicine, PGY-1  [phone]    STAFF NOTE:  I have personally taken the history and examined this patient and agree with residents Note as stated above

## 2017-05-10 NOTE — PLAN OF CARE
Problem: Patient Care Overview  Goal: Plan of Care Review  Outcome: Ongoing (interventions implemented as appropriate)    05/10/17 9832   Coping/Psychosocial   Plan Of Care Reviewed With patient;spouse      Pt resting comfortably and involved in plan of care. Afebrile. Denies nausea. Pain beginning to be controlled rotating norco and dilaudid about q 2 hours. Pt weaned to 3LPM N/C maintaining O2 sats >95%. 4gm Mg administered. 500 cc bolus NS given for low BP. Tele monitoring continued. Blood sugars monitored AC; no sliding coverage needed. Pt and  extensively discussing hospice; planning to d/c tomorrow. Pt has remained free from injury this shift. Bed in low locked position. Call light and personal belongings within reach. Side rails up x2. Wearing nonskid socks. VSS. All questions answered. Will continue to monitor.

## 2017-05-11 VITALS
WEIGHT: 99 LBS | HEIGHT: 59 IN | HEART RATE: 60 BPM | DIASTOLIC BLOOD PRESSURE: 60 MMHG | OXYGEN SATURATION: 98 % | TEMPERATURE: 98 F | RESPIRATION RATE: 16 BRPM | BODY MASS INDEX: 19.96 KG/M2 | SYSTOLIC BLOOD PRESSURE: 107 MMHG

## 2017-05-11 DIAGNOSIS — C34.90 LUNG CANCER: Primary | ICD-10-CM

## 2017-05-11 LAB
ANION GAP SERPL CALC-SCNC: 7 MMOL/L
BASOPHILS # BLD AUTO: 0.01 K/UL
BASOPHILS NFR BLD: 0.2 %
BUN SERPL-MCNC: 9 MG/DL
CALCIUM SERPL-MCNC: 9.1 MG/DL
CHLORIDE SERPL-SCNC: 102 MMOL/L
CO2 SERPL-SCNC: 27 MMOL/L
CREAT SERPL-MCNC: 0.7 MG/DL
DIFFERENTIAL METHOD: ABNORMAL
EOSINOPHIL # BLD AUTO: 0 K/UL
EOSINOPHIL NFR BLD: 0 %
ERYTHROCYTE [DISTWIDTH] IN BLOOD BY AUTOMATED COUNT: 13.7 %
EST. GFR  (AFRICAN AMERICAN): >60 ML/MIN/1.73 M^2
EST. GFR  (NON AFRICAN AMERICAN): >60 ML/MIN/1.73 M^2
GLUCOSE SERPL-MCNC: 214 MG/DL
HCT VFR BLD AUTO: 33.1 %
HGB BLD-MCNC: 11.1 G/DL
LYMPHOCYTES # BLD AUTO: 0.4 K/UL
LYMPHOCYTES NFR BLD: 9.7 %
MAGNESIUM SERPL-MCNC: 1.9 MG/DL
MCH RBC QN AUTO: 27.6 PG
MCHC RBC AUTO-ENTMCNC: 33.5 %
MCV RBC AUTO: 82 FL
MONOCYTES # BLD AUTO: 0.1 K/UL
MONOCYTES NFR BLD: 2 %
NEUTROPHILS # BLD AUTO: 3.9 K/UL
NEUTROPHILS NFR BLD: 87.6 %
PHOSPHATE SERPL-MCNC: 4 MG/DL
PLATELET # BLD AUTO: 207 K/UL
PMV BLD AUTO: 9.8 FL
POCT GLUCOSE: 176 MG/DL (ref 70–110)
POCT GLUCOSE: 232 MG/DL (ref 70–110)
POCT GLUCOSE: 278 MG/DL (ref 70–110)
POTASSIUM SERPL-SCNC: 5 MMOL/L
RBC # BLD AUTO: 4.02 M/UL
SODIUM SERPL-SCNC: 136 MMOL/L
WBC # BLD AUTO: 4.42 K/UL

## 2017-05-11 PROCEDURE — 94760 N-INVAS EAR/PLS OXIMETRY 1: CPT

## 2017-05-11 PROCEDURE — 80048 BASIC METABOLIC PNL TOTAL CA: CPT

## 2017-05-11 PROCEDURE — 36415 COLL VENOUS BLD VENIPUNCTURE: CPT

## 2017-05-11 PROCEDURE — 99223 1ST HOSP IP/OBS HIGH 75: CPT | Mod: ,,, | Performed by: INTERNAL MEDICINE

## 2017-05-11 PROCEDURE — 83735 ASSAY OF MAGNESIUM: CPT

## 2017-05-11 PROCEDURE — 27000221 HC OXYGEN, UP TO 24 HOURS

## 2017-05-11 PROCEDURE — 25000003 PHARM REV CODE 250: Performed by: STUDENT IN AN ORGANIZED HEALTH CARE EDUCATION/TRAINING PROGRAM

## 2017-05-11 PROCEDURE — 63600175 PHARM REV CODE 636 W HCPCS: Performed by: STUDENT IN AN ORGANIZED HEALTH CARE EDUCATION/TRAINING PROGRAM

## 2017-05-11 PROCEDURE — 94640 AIRWAY INHALATION TREATMENT: CPT

## 2017-05-11 PROCEDURE — 25000003 PHARM REV CODE 250: Performed by: INTERNAL MEDICINE

## 2017-05-11 PROCEDURE — 84100 ASSAY OF PHOSPHORUS: CPT

## 2017-05-11 PROCEDURE — 25000242 PHARM REV CODE 250 ALT 637 W/ HCPCS: Performed by: INTERNAL MEDICINE

## 2017-05-11 PROCEDURE — 85025 COMPLETE CBC W/AUTO DIFF WBC: CPT

## 2017-05-11 RX ORDER — ONDANSETRON 2 MG/ML
8 INJECTION INTRAMUSCULAR; INTRAVENOUS ONCE AS NEEDED
Status: DISCONTINUED | OUTPATIENT
Start: 2017-05-11 | End: 2017-05-11 | Stop reason: HOSPADM

## 2017-05-11 RX ORDER — HYDROCODONE BITARTRATE AND ACETAMINOPHEN 10; 325 MG/1; MG/1
1 TABLET ORAL EVERY 4 HOURS PRN
Qty: 60 TABLET | Refills: 0 | Status: SHIPPED | OUTPATIENT
Start: 2017-05-11

## 2017-05-11 RX ORDER — PREDNISONE 20 MG/1
40 TABLET ORAL DAILY
Qty: 60 TABLET | Refills: 0 | Status: SHIPPED | OUTPATIENT
Start: 2017-05-11 | End: 2017-06-10

## 2017-05-11 RX ORDER — HYDROMORPHONE HYDROCHLORIDE 4 MG/1
4 TABLET ORAL
Qty: 60 TABLET | Refills: 0 | Status: SHIPPED | OUTPATIENT
Start: 2017-05-11

## 2017-05-11 RX ORDER — POLYETHYLENE GLYCOL 3350 17 G/17G
17 POWDER, FOR SOLUTION ORAL DAILY
Qty: 30 EACH | Refills: 3 | Status: CANCELLED | OUTPATIENT
Start: 2017-05-11

## 2017-05-11 RX ORDER — HYDROCODONE BITARTRATE AND ACETAMINOPHEN 10; 325 MG/1; MG/1
1 TABLET ORAL EVERY 4 HOURS PRN
Qty: 90 TABLET | Refills: 0 | Status: CANCELLED | OUTPATIENT
Start: 2017-05-11

## 2017-05-11 RX ORDER — LIDOCAINE 50 MG/G
1 PATCH TOPICAL DAILY
Qty: 30 PATCH | Refills: 0 | Status: CANCELLED | OUTPATIENT
Start: 2017-05-11

## 2017-05-11 RX ORDER — METHADONE HYDROCHLORIDE 5 MG/1
5 TABLET ORAL EVERY 8 HOURS
Qty: 90 TABLET | Refills: 0 | Status: SHIPPED | OUTPATIENT
Start: 2017-05-11

## 2017-05-11 RX ORDER — PREDNISONE 20 MG/1
40 TABLET ORAL DAILY
Status: DISCONTINUED | OUTPATIENT
Start: 2017-05-11 | End: 2017-05-11 | Stop reason: HOSPADM

## 2017-05-11 RX ORDER — HYDROMORPHONE HYDROCHLORIDE 4 MG/1
4 TABLET ORAL
Qty: 90 TABLET | Refills: 0 | Status: CANCELLED | OUTPATIENT
Start: 2017-05-11

## 2017-05-11 RX ORDER — ALBUTEROL SULFATE 90 UG/1
1-2 AEROSOL, METERED RESPIRATORY (INHALATION) EVERY 6 HOURS PRN
Qty: 1 INHALER | Refills: 1 | Status: SHIPPED | OUTPATIENT
Start: 2017-05-11 | End: 2018-05-11

## 2017-05-11 RX ORDER — PREDNISONE 20 MG/1
40 TABLET ORAL DAILY
Qty: 30 TABLET | Refills: 1 | Status: CANCELLED | OUTPATIENT
Start: 2017-05-11

## 2017-05-11 RX ORDER — METHADONE HYDROCHLORIDE 5 MG/1
5 TABLET ORAL EVERY 8 HOURS
Qty: 90 TABLET | Refills: 0 | Status: CANCELLED | OUTPATIENT
Start: 2017-05-11

## 2017-05-11 RX ORDER — AMOXICILLIN 250 MG
1 CAPSULE ORAL 2 TIMES DAILY
Status: CANCELLED | COMMUNITY
Start: 2017-05-11

## 2017-05-11 RX ADMIN — HYDROCODONE BITARTRATE AND ACETAMINOPHEN 1 TABLET: 10; 325 TABLET ORAL at 05:05

## 2017-05-11 RX ADMIN — ASPIRIN 81 MG: 81 TABLET, COATED ORAL at 09:05

## 2017-05-11 RX ADMIN — INSULIN ASPART 3 UNITS: 100 INJECTION, SOLUTION INTRAVENOUS; SUBCUTANEOUS at 12:05

## 2017-05-11 RX ADMIN — HYDROMORPHONE HYDROCHLORIDE 4 MG: 2 TABLET ORAL at 11:05

## 2017-05-11 RX ADMIN — VITAMIN D, TAB 1000IU (100/BT) 1000 UNITS: 25 TAB at 09:05

## 2017-05-11 RX ADMIN — PREDNISONE 40 MG: 20 TABLET ORAL at 09:05

## 2017-05-11 RX ADMIN — HYDROCODONE BITARTRATE AND ACETAMINOPHEN 1 TABLET: 10; 325 TABLET ORAL at 09:05

## 2017-05-11 RX ADMIN — GABAPENTIN 300 MG: 300 CAPSULE ORAL at 05:05

## 2017-05-11 RX ADMIN — STANDARDIZED SENNA CONCENTRATE AND DOCUSATE SODIUM 1 TABLET: 8.6; 5 TABLET, FILM COATED ORAL at 09:05

## 2017-05-11 RX ADMIN — HYDROCODONE BITARTRATE AND ACETAMINOPHEN 1 TABLET: 10; 325 TABLET ORAL at 03:05

## 2017-05-11 RX ADMIN — Medication 12.5 MG: at 09:05

## 2017-05-11 RX ADMIN — INSULIN ASPART 2 UNITS: 100 INJECTION, SOLUTION INTRAVENOUS; SUBCUTANEOUS at 12:05

## 2017-05-11 RX ADMIN — PANCRELIPASE 3 CAPSULE: 60000; 12000; 38000 CAPSULE, DELAYED RELEASE PELLETS ORAL at 12:05

## 2017-05-11 RX ADMIN — LIDOCAINE 1 PATCH: 50 PATCH TOPICAL at 10:05

## 2017-05-11 RX ADMIN — METHADONE HYDROCHLORIDE 5 MG: 5 TABLET ORAL at 05:05

## 2017-05-11 RX ADMIN — LEVOTHYROXINE SODIUM 100 MCG: 100 TABLET ORAL at 05:05

## 2017-05-11 RX ADMIN — PANTOPRAZOLE SODIUM 40 MG: 40 TABLET, DELAYED RELEASE ORAL at 09:05

## 2017-05-11 RX ADMIN — METHADONE HYDROCHLORIDE 5 MG: 5 TABLET ORAL at 01:05

## 2017-05-11 RX ADMIN — PANCRELIPASE 3 CAPSULE: 60000; 12000; 38000 CAPSULE, DELAYED RELEASE PELLETS ORAL at 08:05

## 2017-05-11 RX ADMIN — IPRATROPIUM BROMIDE AND ALBUTEROL SULFATE 3 ML: .5; 3 SOLUTION RESPIRATORY (INHALATION) at 11:05

## 2017-05-11 RX ADMIN — GABAPENTIN 300 MG: 300 CAPSULE ORAL at 01:05

## 2017-05-11 RX ADMIN — HYDROMORPHONE HYDROCHLORIDE 4 MG: 2 TABLET ORAL at 03:05

## 2017-05-11 RX ADMIN — INSULIN ASPART 3 UNITS: 100 INJECTION, SOLUTION INTRAVENOUS; SUBCUTANEOUS at 08:05

## 2017-05-11 RX ADMIN — POLYETHYLENE GLYCOL 3350 17 G: 17 POWDER, FOR SOLUTION ORAL at 09:05

## 2017-05-11 RX ADMIN — INSULIN DETEMIR 2 UNITS: 100 INJECTION, SOLUTION SUBCUTANEOUS at 08:05

## 2017-05-11 NOTE — PROGRESS NOTES
Followed up with Dara from [facility]. She met with patient and  to discuss hospice. All paperwork has been completed.  will let her know when he wants equipment delivered.

## 2017-05-11 NOTE — PLAN OF CARE
[facility]     Department of Hospital Medicine     [address] [place] [address]     [phone] after hours  [phone] fax                                   HOSPICE  ORDERS     05/11/2017    Admit to Hospice:  Home Service    Diagnoses:  Active Hospital Problems    Diagnosis  POA    *Pneumothorax [J93.9]  Yes    Shortness of breath [R06.02]  Yes    Pleural effusion [J90]  Yes    Bone metastases [C79.51]  Yes     Chronic    Liver metastases [C78.7]  Yes     Chronic    Type 2 diabetes mellitus without complication, without long-term current use of insulin [E11.9]  Yes     Chronic    Vitamin D deficiency [E55.9]  Yes    Pancreatic adenocarcinoma [C25.9]  Yes     Chronic    Acquired hypothyroidism [E03.9]  Yes     Chronic    Gastroesophageal reflux disease with esophagitis:  see EUS 4/14 [K21.0]  Yes     Chronic    Coronary artery disease involving native coronary artery of native heart without angina pectoris [I25.10]  Yes     Chronic     RCA BMS  -LHC 9/2011 LM normal; pLAD 10%; D1 10%; LCX LIs; OM1/2 LIS; ;mRCA 10% with patent stent; PLB LIs; PDA LIs      Hyperlipidemia [E78.5]  Yes     Chronic     Myalgia on pravastatin        Resolved Hospital Problems    Diagnosis Date Resolved POA   No resolved problems to display.       Hospice Qualifying Diagnoses: Pancreatic Cancer       Patient has a life expectancy < 6 months due to these conditions.    Vital Signs: Routine per Hospice Protocol.    Allergies:  Review of patient's allergies indicates:   Allergen Reactions    Penicillins Swelling    Demerol [meperidine] Swelling    Dilaudid [hydromorphone (bulk)] Other (See Comments)     redness    Percocet [oxycodone-acetaminophen] Swelling    Adhesive tape-silicones Rash       Diet: Regular    Activities: As tolerated    Nursing: Per Hospice Routine    Future Orders:  Hospice Medical Director may dictate new orders for comfortable care measures &  "sign death certificate.    Medications:    Lashay Whitley   Home Medication Instructions MAYRA:[id number]    Printed on:05/11/17 1036   Medication Information                                 aspirin (ECOTRIN) 81 MG EC tablet  Take 81 mg by mouth once daily.             atorvastatin (LIPITOR) 40 MG tablet  Take 1 tablet (40 mg total) by mouth once daily.             BD ULTRA-FINE EDWIN PEN NEEDLES 32 x 5/32 " Ndle  Uses 5 times daily, on multiple daily insulin injections             diazePAM (VALIUM) 5 MG tablet  Take 1 tablet (5 mg total) by mouth every 12 (twelve) hours as needed (muscle spasms).             gabapentin (NEURONTIN) 300 MG capsule  Take 1 capsule (300 mg total) by mouth 3 (three) times daily.             hydrocodone-acetaminophen 10-325mg (NORCO)  mg Tab  Take 1 tablet by mouth every 4 (four) hours as needed.             HYDROmorphone (DILAUDID) 4 MG tablet  Take 1 tablet (4 mg total) by mouth every 3 (three) hours as needed.             insulin aspart (NOVOLOG FLEXPEN) 100 unit/mL InPn pen  Inject 6 Units into the skin 3 (three) times daily with meals. With correction scale for 33 units max TDD             insulin glargine, TOUJEO, (TOUJEO SOLOSTAR) 300 unit/mL (1.5 mL) InPn pen  Inject 4 Units into the skin 2 (two) times daily.             ipratropium (ATROVENT) 0.02 % nebulizer solution  Take 2.5 mLs (500 mcg total) by nebulization 4 (four) times daily as needed. Rescue             levothyroxine (SYNTHROID) 100 MCG tablet  Take 1 tablet (100 mcg total) by mouth once daily.             lipase-protease-amylase 36,000-114,000- 180,000 unit CpDR  Take 3 capsules by mouth 3 (three) times daily with meals.             loratadine (CLARITIN) 10 mg tablet  Take 10 mg by mouth once daily.             methadone (DOLOPHINE) 5 MG tablet  Take 1 tablet (5 mg total) by mouth every 8 (eight) hours.                        metoprolol tartrate (LOPRESSOR) 25 MG tablet  Take 0.5 tablets (12.5 mg total) by mouth 2 " (two) times daily.             multivitamin capsule  Take 1 capsule by mouth once daily.             nitroGLYCERIN (NITROSTAT) 0.4 MG SL tablet  Place 1 tablet (0.4 mg total) under the tongue every 5 (five) minutes as needed for Chest pain.             ondansetron (ZOFRAN-ODT) 8 MG TbDL  Take 1 tablet (8 mg total) by mouth every 6 (six) hours as needed.             pantoprazole (PROTONIX) 40 MG tablet  Take 1 tablet (40 mg total) by mouth once daily.             predniSONE (DELTASONE) 20 MG tablet  Take 2 tablets (40 mg total) by mouth once daily.             vitamin D 1000 units Tab  Take 185 mg by mouth once daily.                   DIABETES CARE:   Nurse to perform and educate diabetic management with blood glucose monitoring:      Fingerstick blood sugar AC and HS     Fingerstick blood sugar every 6 hours if patient is unable to eat    Report CBG < 60 or > 350 to physician.         Insulin Sliding Scale         Glucose  Novolog Insulin Subcutaneous        0 - 60   Orange juice or glucose tablet      No insulin   201-250  2 units   251-300  4 units   301-350  6 units   351-400  8 units   >400   10 units then call physician        Parminder Hussein MD  05/11/2017    STAFF NOTE:  Agree with above

## 2017-05-11 NOTE — PROGRESS NOTES
Notified Dara with [facility] of discharge today. She will contact  to see if he will want equipment delivered today and set up admit for tomorrow.

## 2017-05-11 NOTE — DISCHARGE SUMMARY
DISCHARGE SUMMARY  Medical Oncology    Team: Networked reference to record PCT     Patient Name: Lashay Whitley  YOB: 1960    Admit Date: 5/7/2017    Discharge Date: 05/11/2017    Discharge Attending Physician: Lenora Costa MD     Diagnoses:  Active Hospital Problems    Diagnosis  POA    *Pneumothorax [J93.9]  Yes    Shortness of breath [R06.02]  Yes    Pleural effusion [J90]  Yes    Bone metastases [C79.51]  Yes     Chronic    Liver metastases [C78.7]  Yes     Chronic    Type 2 diabetes mellitus without complication, without long-term current use of insulin [E11.9]  Yes     Chronic    Vitamin D deficiency [E55.9]  Yes    Pancreatic adenocarcinoma [C25.9]  Yes     Chronic    Acquired hypothyroidism [E03.9]  Yes     Chronic    Gastroesophageal reflux disease with esophagitis:  see EUS 4/14 [K21.0]  Yes     Chronic    Coronary artery disease involving native coronary artery of native heart without angina pectoris [I25.10]  Yes     Chronic     RCA BMS  -LHC 9/2011 LM normal; pLAD 10%; D1 10%; LCX LIs; OM1/2 LIS; ;mRCA 10% with patent stent; PLB LIs; PDA LIs      Hyperlipidemia [E78.5]  Yes     Chronic     Myalgia on pravastatin        Resolved Hospital Problems    Diagnosis Date Resolved POA   No resolved problems to display.       Discharged Condition: admit problems have stabilized    HOSPITAL COURSE:   Ms. Whitley is a 57/F with PMH DMII, HTN, HLD, CAD s/p stenting 2012 with recent NSTEMI 03/2017, remote h/o breast cancer, pancreatic cancer with bony and liver metastasis treated with chemo and radiation followed by Dr. Nguyễn who presented to ED with worsening SOB. She states that she has noted worsening shortness of breath for the last several days especially; she recently went to Pulmonology on 05/03/17 for tap of R pleural effusion with ~1200mL fluid removed after having found bilateral effusions with worsening SOB on a heme/onc clinic visit 04/25/17. For the past several days after her  tap she has noted increasing shortness of breath as well as some wheezing. Symptoms worsened today while patient had an episode of loose stool. She noted SOB, wheezing, diaphoresis and pallor; her  used a portable pulse-oximeter and found her to have bradycardia with HR 35 and oxygen saturation ~86%; they contacted EMS who brought her to the ED via ambulance. Per patient and family, while in the ambulance she had an episode of central chest pain with some jaw discomfort; this has since resolved. Upon arrival, patient is still short of breath, wheezing, and has mild jaw pain. She does not do breathing treatments at home but is normally on oxygen at home which she began 2 weeks ago. She reports feeling like she needed more oxygen today. Patient also endorses recent hemoptysis following a right thorocentesis 4 days ago. She denies recent appetite changes or fever. Patient took morphine, Neurontin, valium, Advil, and methadone today for pain control prior to arrival.     In the ED, she was noted to have pneumothorax vs trapped lung. Pig-tail catheter placed for PTX by pulmonary on night of presentation. She subsequently underwent L thoracentesis with 1.1L fluid removed. Her respiratory status slowly improved and she was weaned down to 3L nasal cannula from HFNC/NRB. She was subsequently discharged home on 5/11/17 with plans to undergo out-patient PluerX catheter placement (5/12/17) by pulmonary prior to initiating home hospice.    Other Medical Problems Addressed in the Hospital:    Pancreatic Cancer/Pain: Patient's methadone was up-titrated to 5mg tid. Her short acting narcotics were changed to dilaudid 4mg q3prn and norco  q4prn. Her pain control improved with these changes. She was referred to [facility] Management Clinic at [facility], [place] for nerve block (scheduled for 5/12/17) prior to initiating home hospice    CAD: continued on ASA, Lipitor, and Lopressor. Plavix discontinued for procedures.    HLD: continued on  home lipitor    DM2: continued on reduced dose levemir and novolog with sliding scale insulin. Resumed home regimen at discharge    GERD: continued on home protonix    Hypothyroid: continued on home synthroid     CONSULTS:   Pulmonary    Last CBC/BMP/HgbA1c (if applicable):  Recent Results (from the past 336 hour(s))   CBC with Automated Differential    Collection Time: 05/11/17  4:16 AM   Result Value Ref Range    WBC 4.42 3.90 - 12.70 K/uL    Hemoglobin 11.1 (L) 12.0 - 16.0 g/dL    Hematocrit 33.1 (L) 37.0 - 48.5 %    Platelets 207 150 - 350 K/uL   CBC with Automated Differential    Collection Time: 05/10/17  5:15 AM   Result Value Ref Range    WBC 4.76 3.90 - 12.70 K/uL    Hemoglobin 10.9 (L) 12.0 - 16.0 g/dL    Hematocrit 32.3 (L) 37.0 - 48.5 %    Platelets 181 150 - 350 K/uL   CBC with Automated Differential    Collection Time: 05/09/17  4:50 AM   Result Value Ref Range    WBC 4.55 3.90 - 12.70 K/uL    Hemoglobin 11.7 (L) 12.0 - 16.0 g/dL    Hematocrit 35.0 (L) 37.0 - 48.5 %    Platelets 178 150 - 350 K/uL     Recent Results (from the past 336 hour(s))   Basic Metabolic Panel (BMP)    Collection Time: 05/11/17  4:16 AM   Result Value Ref Range    Sodium 136 136 - 145 mmol/L    Potassium 5.0 3.5 - 5.1 mmol/L    Chloride 102 95 - 110 mmol/L    CO2 27 23 - 29 mmol/L    BUN, Bld 9 6 - 20 mg/dL    Creatinine 0.7 0.5 - 1.4 mg/dL    Calcium 9.1 8.7 - 10.5 mg/dL    Anion Gap 7 (L) 8 - 16 mmol/L   Basic Metabolic Panel (BMP)    Collection Time: 05/10/17  5:15 AM   Result Value Ref Range    Sodium 139 136 - 145 mmol/L    Potassium 4.5 3.5 - 5.1 mmol/L    Chloride 103 95 - 110 mmol/L    CO2 28 23 - 29 mmol/L    BUN, Bld 9 6 - 20 mg/dL    Creatinine 0.6 0.5 - 1.4 mg/dL    Calcium 8.6 (L) 8.7 - 10.5 mg/dL    Anion Gap 8 8 - 16 mmol/L   Basic Metabolic Panel (BMP)    Collection Time: 05/09/17  4:50 AM   Result Value Ref Range    Sodium 138 136 - 145 mmol/L    Potassium 4.3 3.5 - 5.1 mmol/L    Chloride 104 95 - 110 mmol/L     "CO2 27 23 - 29 mmol/L    BUN, Bld 10 6 - 20 mg/dL    Creatinine 0.6 0.5 - 1.4 mg/dL    Calcium 8.8 8.7 - 10.5 mg/dL    Anion Gap 7 (L) 8 - 16 mmol/L     Lab Results   Component Value Date    HGBA1C 5.7 05/08/2017       Pertinent/Significant Diagnostic Studies:      CTA 5/8/17:  1.  No evidence of pulmonary thromboembolism.    2.  Small bilateral hydropneumothoraces.  Large right and small left pleural effusions.    3.  Diffuse peribronchial thickening with extensive diffuse groundglass opacity throughout the visualized parenchyma with scattered opacity/consolidated density throughout the lungs, particularly within the left lower lobe.  There is also interlobular septal thickening.  Findings could represent underlying infectious process or possibly lymphangitic carcinomatosis in this patient with known history of pancreatic malignancy.    Special Treatments/Procedures:     L thoracentesis with 1.1L fluid removed on 5/8/17    Disposition:  Home       Future Scheduled Appointments:  Future Appointments  Date Time Provider Department Center   5/12/2017 1:30 PM Kavita Crump MD [facility] PAINMGT [facility]   5/23/2017 10:40 AM Tanner Leos MD [facility] CARDIO [facility]       Follow-up Plans from This Hospitalization:  Hematology/Oncology (Dr. Nguyễn)  [facility] (Dr. Crump)  Pulmonary (Dr. Metcalf)  Hospice    Discharge Medication List:   Lashay Whitley   Home Medication Instructions MAYRA:[id number]    Printed on:05/11/17 1058   Medication Information                      ascorbic acid, vitamin C, (VITAMIN C) 500 MG tablet  Take 500 mg by mouth 2 (two) times daily.             aspirin (ECOTRIN) 81 MG EC tablet  Take 81 mg by mouth once daily.             atorvastatin (LIPITOR) 40 MG tablet  Take 1 tablet (40 mg total) by mouth once daily.             BD ULTRA-FINE EDWIN PEN NEEDLES 32 x 5/32 " Ndle  Uses 5 times daily, on multiple daily insulin injections             diazePAM (VALIUM) 5 MG tablet  Take 1 tablet " (5 mg total) by mouth every 12 (twelve) hours as needed (muscle spasms).             gabapentin (NEURONTIN) 300 MG capsule  Take 1 capsule (300 mg total) by mouth 3 (three) times daily.             hydrocodone-acetaminophen 10-325mg (NORCO)  mg Tab  Take 1 tablet by mouth every 4 (four) hours as needed.             HYDROmorphone (DILAUDID) 4 MG tablet  Take 1 tablet (4 mg total) by mouth every 3 (three) hours as needed.             insulin aspart (NOVOLOG FLEXPEN) 100 unit/mL InPn pen  Inject 6 Units into the skin 3 (three) times daily with meals. With correction scale for 33 units max TDD             insulin glargine, TOUJEO, (TOUJEO SOLOSTAR) 300 unit/mL (1.5 mL) InPn pen  Inject 4 Units into the skin 2 (two) times daily.             ipratropium (ATROVENT) 0.02 % nebulizer solution  Take 2.5 mLs (500 mcg total) by nebulization 4 (four) times daily. Rescue             levothyroxine (SYNTHROID) 100 MCG tablet  Take 1 tablet (100 mcg total) by mouth once daily.             lipase-protease-amylase 36,000-114,000- 180,000 unit CpDR  Take 3 capsules by mouth 3 (three) times daily with meals.             loratadine (CLARITIN) 10 mg tablet  Take 10 mg by mouth once daily.             methadone (DOLOPHINE) 5 MG tablet  Take 1 tablet (5 mg total) by mouth every 8 (eight) hours.             metoclopramide HCl (REGLAN) 10 MG tablet  Take 1 tablet (10 mg total) by mouth 4 (four) times daily before meals and nightly.             metoprolol tartrate (LOPRESSOR) 25 MG tablet  Take 0.5 tablets (12.5 mg total) by mouth 2 (two) times daily.             multivitamin capsule  Take 1 capsule by mouth once daily.             nitroGLYCERIN (NITROSTAT) 0.4 MG SL tablet  Place 1 tablet (0.4 mg total) under the tongue every 5 (five) minutes as needed for Chest pain.             ondansetron (ZOFRAN-ODT) 8 MG TbDL  Take 1 tablet (8 mg total) by mouth every 6 (six) hours as needed.             pantoprazole (PROTONIX) 40 MG tablet  Take 1  tablet (40 mg total) by mouth once daily.             predniSONE (DELTASONE) 20 MG tablet  Take 2 tablets (40 mg total) by mouth once daily.             vitamin D 1000 units Tab  Take 185 mg by mouth once daily.                 Patient Instructions:    Discharge Procedure Orders  Diet general     Call MD for:  temperature >100.4     Call MD for:  persistent nausea and vomiting or diarrhea     Call MD for:  severe uncontrolled pain     Call MD for:  redness, tenderness, or signs of infection (pain, swelling, redness, odor or green/yellow discharge around incision site)     Call MD for:  difficulty breathing or increased cough     Call MD for:  worsening rash     Call MD for:  severe persistent headache     Call MD for:  persistent dizziness, light-headedness, or visual disturbances     Call MD for:  increased confusion or weakness         Parminder Hussein MD (PGY-4)    STAFF NOTE:  Agree with above

## 2017-05-11 NOTE — PROGRESS NOTES
Hematology/Oncology   Progress Note                                                              Team: Networked reference to record PCT     Patient Name: Lashay Whitley  YOB: 1960    Admit Date: 5/7/2017                     LOS: 4    SUBJECTIVE:     56 y/o F with h/o breast cancer (2000 s/p bilateral mastectomy and chemo), pancreatic cancer (dx: with recurrence (s/p Whipple), CAD (s/p PCI x2 in 2012; NSTEMI in Mar 2017), HLD, Hypothyroidism, T2DM who presents to the ED with complaints of SOB with associated chest pain    Brief oncologic history:   H/o of breast cancer - 2000 s/p bilateral mastectomy + chemo; in remission since  H/o of pancreatic cancer (ductal adenocarcinoma)- diagnosed 2012 - s/p folforinox x 4 cycles + whipple + adjuvant gemcitabine which was d/c due to poor tolerance   - mets to liver: 11/22/16 - biopsied   - mets to bone (T-spine) - received radiation  Pt of Dr. Almonte    Reason for Admission:  Pneumothorax  See H&P for detailed initial presentation history and ROS.      Interval history:   Patient seen and examined this AM. Patient had NAEON. Reports significant improvement of pain with increased pain meds and steroids x1    OBJECTIVE:     Vital Signs Range (Last 24H):  Temp:  [97.8 °F (36.6 °C)-98.2 °F (36.8 °C)]   Pulse:  [55-83]   Resp:  [15-18]   BP: ()/(58-74)   SpO2:  [93 %-100 %] Body mass index is 20 kg/(m^2).  Wt Readings from Last 1 Encounters:   05/07/17 1809 44.9 kg (99 lb)       I & O (Last 24H):    Intake/Output Summary (Last 24 hours) at 05/11/17 0820  Last data filed at 05/11/17 0100   Gross per 24 hour   Intake             1700 ml   Output                0 ml   Net             1700 ml     Physical Exam:  General: well developed, no distress  Eyes: conjunctivae/corneas clear. PERRL..  HENT: Head:normocephalic, atraumatic.  Neck: supple, symmetrical, trachea midline, no JVD and thyroid not enlarged  Lungs: Diffuse expiratory wheezes on right and left.  Diminished breath sounds in bases  Cardiovascular: Heart: regular rate and rhythm, S1, S2 normal, no murmur, click, rub or gallop. Pulses: 2+ and symmetric.  Abdomen: soft, non-tender non-distented; bowel sounds normal; no masses,  no organomegaly.   Skin: Skin color, texture, turgor normal. No rashes or lesion  Neurologic: Normal strength and tone. No focal numbness or weakness  Psych/Behavioral:  Alert and oriented x3.    Diagnostic Results:  Lab Results   Component Value Date    WBC 4.42 05/11/2017    HGB 11.1 (L) 05/11/2017    HCT 33.1 (L) 05/11/2017    MCV 82 05/11/2017     05/11/2017       Recent Labs  Lab 05/11/17  0416   *      K 5.0      CO2 27   BUN 9   CREATININE 0.7   CALCIUM 9.1   MG 1.9     Lab Results   Component Value Date    INR 1.0 05/07/2017    INR 1.0 03/08/2017    INR 1.0 12/06/2016     Lab Results   Component Value Date    HGBA1C 5.7 05/08/2017     Recent Labs      05/08/17   1831  05/08/17   2111  05/09/17   0735  05/09/17   1832  05/09/17   2157  05/10/17   0839  05/10/17   1313  05/10/17   1822   POCTGLUCOSE  74  102  79  102  182*  94  108  187*     CXR (5/7/17): Right pneumothorax, with possible chest tube versus extraneous material overlying the patient, correlation is recommended.    CXR (5/10/17): no interval change     ASSESSMENT/PLAN:   58 y/o F with h/o breast cancer (2000 s/p bilateral mastectomy and chemo), pancreatic cancer (dx: with recurrence (s/p Whipple), CAD (s/p PCI x2 in 2012; NSTEMI in Mar 2017), HLD, Hypothyroidism, T2DM who presents to the ED with complaints of SOB with associated chest pain.     # Hypoxemia Respiratory Failure  # SOB  # Pneumothorax - on right lung  # Pleural effusion - on left lung  - SOB 2/2 pneumothorax vs. Pleural effusion; both likely from progressive malignancy  - clinically, patient feels more relief c/w initial presentaiton  - per Pulm recs, CTA to r/o PE  - pneumothorax on right lung vs trapped lung s/p chest tube  placement 5/7/17; will monitor CT output q24h. Appreciate Pulm assistance  - pleural effusion   - h/o b/l pleural effusion noted on CT Chest 2/27/17   - s/p thoracentesis on right on 5/3/17. Effusion from that tap consistent for exudative effusion per Lights Criteria   - Effusion on left noted to be increased on admission 5/7/17 c/w CXR from 5/3/17 - s/p thoracentesis on left on 5/8/17. 1.1L serous fluid drained and exudative per lights criteria.    - Pulm on board- recommend using NRB on 5/8 to help w/ N2 in pneumothorax.Switched over to HFNC and overnight doing better. Currently on 3L.on regular nasal cannula.    - plan for pleurex catheter to be placed by Dr. Metcalf in outpatient clinic, on Friday   - duonebs prn and start slow steroid taper with prednisone 40mg daily    # Pancreatic cancer (Ductal carcinoma, stage 4)  # Bone metastases  # Liver metastases  - w/ mets to liver, bone and lung (pleural effusion = exudative; likely malignancy)  - pain management: at home consists of methadone 5mg BID, MS contin TID, neurontin 300mg TID   - confirmed with primary oncologist regarding use of MS contin & methadone to confirm patient was intentionally placed on both, but given risk of using 2+ long-acting analgesics, convering MS contin to methadone & providing breakthrough pain relief   - Methadone 5mg TID for basal pain control + percocet  q4h moderate pain prn + + Hydromorphone 4mg q4h prn severe pain. Will continue to monitor and titrate as necessary to keep patient comfortable   - in addition, lidocaine patches provided + heat/ice packs  - nausea: zofran 8mg q8h prn & IV zofran 4mg q8h prn  - continue creon tabs (lipase-protease-amylase) tabs order TIDWM   - will pursue hospice after PleurX placement    # CAD   - s/p PCI 2012  - NSTEMI on 3/8/17 was managed medically without intervention.  - on presentation had chest pain + SOB which has now resolved. Troponin: 0.3 and ECG NSR. If patient was still symptomatic  of chest pain, then would repeat troponin to see if downtrended but given lack of clinical symptoms, no need to repeat at this time.   - was on ASA, plavix, atorvastatin, metoprolol 12.5 BID  - LVEF was 50%, preserved.  - No ACEI because of low blood pressure.  - spoke to Cardiology fellow (Dr. Stanton) and confirmed that plavix can be discontinued at this time. Plavix stopped 5/8/17  - continue medical management    # Hyperlipidemia  - ASCVD: 3.9% based on lipid panel 9/13/17  - continue atorvastatin 40mg qD    # T2DM  - h/o T2DM on insulin 2 detemir  + 3 U aspart TIDWM  - however, HbA1c 5.7 today. Given h/o pancreatic cancer, insulin/glucagon levels may vary  - will continue to watch POCT AC&HS    # Gastroesophageal reflux disease with esophagitis per EUS 4/14  - continue home protonix daily    # Hypothyroidism  - continue home levothyroxine 100mcg qD    Diet: regular diet  DVT: enoxaparin  Full code    DISPO: likely discharge home today with plans for out-patient PleurX Friday. Then Hospice    Parminder Hussein MD (PGY-4)  Hematology/Oncology Fellow  Will discuss with Dr. Costa (Hematology/Oncology Staff)    STAFF NOTE:  I have personally taken the history and examined this patient and agree with Dr. Hussein's Note as stated above.

## 2017-05-11 NOTE — PLAN OF CARE
Problem: Patient Care Overview  Goal: Plan of Care Review  Outcome: Ongoing (interventions implemented as appropriate)  Patient remained free from falls throughout shift, call bell within reach. Metoprolol held in beginning of shift due to low BP (per MD's orders.) pain well controlled throughout night. Prn dilaudid and norco given once. Scheduled methadone continued. Patient and  anxious to go home today, states they would rather get pleurx catheter placement while they are here instead of having to come back. Cardiac monitoring and continuous pulse ox continued - patient on 3L NC pulse ox 93-97%. Vitals stable, will continue to monitor.

## 2017-05-11 NOTE — PROGRESS NOTES
Discharge instructions and prescriptions given and explained to pt.  Pt. verbalized understanding with no further questions.  Peripheral IV D/C'd with catheter tip intact.  VS WDL.  Patient is leaving via wheelchair with her .     ROAD TEST  O=SpO2 98% on 3L O2  A=Ambulating around room and hallway  D=IV D/C'd  T=Tolerating regular diet  E=Voids  S=Performs self care independently

## 2017-05-12 ENCOUNTER — HOSPITAL ENCOUNTER (OUTPATIENT)
Facility: HOSPITAL | Age: 57
Discharge: HOME | End: 2017-05-12
Attending: INTERNAL MEDICINE | Admitting: INTERNAL MEDICINE
Payer: COMMERCIAL

## 2017-05-12 ENCOUNTER — SURGERY (OUTPATIENT)
Age: 57
End: 2017-05-12

## 2017-05-12 ENCOUNTER — OFFICE VISIT (OUTPATIENT)
Dept: PAIN MEDICINE | Facility: CLINIC | Age: 57
End: 2017-05-12
Attending: ANESTHESIOLOGY
Payer: COMMERCIAL

## 2017-05-12 VITALS
SYSTOLIC BLOOD PRESSURE: 123 MMHG | WEIGHT: 100 LBS | DIASTOLIC BLOOD PRESSURE: 74 MMHG | HEART RATE: 61 BPM | TEMPERATURE: 98 F | HEIGHT: 59 IN | BODY MASS INDEX: 20.16 KG/M2 | RESPIRATION RATE: 20 BRPM

## 2017-05-12 VITALS
SYSTOLIC BLOOD PRESSURE: 151 MMHG | TEMPERATURE: 98 F | BODY MASS INDEX: 20.16 KG/M2 | DIASTOLIC BLOOD PRESSURE: 99 MMHG | OXYGEN SATURATION: 95 % | HEIGHT: 59 IN | WEIGHT: 100 LBS | RESPIRATION RATE: 19 BRPM | HEART RATE: 90 BPM

## 2017-05-12 DIAGNOSIS — E03.9 ACQUIRED HYPOTHYROIDISM: Chronic | ICD-10-CM

## 2017-05-12 DIAGNOSIS — D64.9 ANEMIA: Chronic | ICD-10-CM

## 2017-05-12 DIAGNOSIS — C50.919 BREAST CANCER: Chronic | ICD-10-CM

## 2017-05-12 DIAGNOSIS — Z87.19 HISTORY OF PANCREATITIS: ICD-10-CM

## 2017-05-12 DIAGNOSIS — J43.2 CENTRILOBULAR EMPHYSEMA: ICD-10-CM

## 2017-05-12 DIAGNOSIS — S22.000A THORACIC COMPRESSION FRACTURE: ICD-10-CM

## 2017-05-12 DIAGNOSIS — H25.019: ICD-10-CM

## 2017-05-12 DIAGNOSIS — C25.9 PANCREATIC ADENOCARCINOMA: Chronic | ICD-10-CM

## 2017-05-12 DIAGNOSIS — C78.7 LIVER METASTASES: Chronic | ICD-10-CM

## 2017-05-12 DIAGNOSIS — C50.011 MALIGNANT NEOPLASM OF AREOLA OF RIGHT BREAST IN FEMALE: Chronic | ICD-10-CM

## 2017-05-12 DIAGNOSIS — H25.10 NUCLEAR SCLEROSIS: ICD-10-CM

## 2017-05-12 DIAGNOSIS — I25.10 CORONARY ARTERY DISEASE INVOLVING NATIVE CORONARY ARTERY OF NATIVE HEART WITHOUT ANGINA PECTORIS: Chronic | ICD-10-CM

## 2017-05-12 DIAGNOSIS — M51.24 HERNIATED THORACIC DISC WITHOUT MYELOPATHY: ICD-10-CM

## 2017-05-12 DIAGNOSIS — Z87.891 FORMER SMOKER: Chronic | ICD-10-CM

## 2017-05-12 DIAGNOSIS — J93.9 PNEUMOTHORAX: Primary | ICD-10-CM

## 2017-05-12 DIAGNOSIS — Z95.5 HISTORY OF CORONARY ARTERY STENT PLACEMENT: Chronic | ICD-10-CM

## 2017-05-12 DIAGNOSIS — R06.02 SHORTNESS OF BREATH: ICD-10-CM

## 2017-05-12 DIAGNOSIS — E55.9 VITAMIN D DEFICIENCY: ICD-10-CM

## 2017-05-12 DIAGNOSIS — M48.54XA: ICD-10-CM

## 2017-05-12 DIAGNOSIS — K21.00 GASTROESOPHAGEAL REFLUX DISEASE WITH ESOPHAGITIS: Chronic | ICD-10-CM

## 2017-05-12 DIAGNOSIS — K57.30 DIVERTICULOSIS OF LARGE INTESTINE WITHOUT HEMORRHAGE: ICD-10-CM

## 2017-05-12 DIAGNOSIS — S22.000A THORACIC COMPRESSION FRACTURE, CLOSED, INITIAL ENCOUNTER: Primary | ICD-10-CM

## 2017-05-12 DIAGNOSIS — E27.8 ADRENAL NODULE: Chronic | ICD-10-CM

## 2017-05-12 DIAGNOSIS — C79.51 BONE METASTASES: Chronic | ICD-10-CM

## 2017-05-12 DIAGNOSIS — E78.5 HYPERLIPIDEMIA: Chronic | ICD-10-CM

## 2017-05-12 DIAGNOSIS — D73.89 NODULE OF SPLEEN: ICD-10-CM

## 2017-05-12 DIAGNOSIS — J90 PLEURAL EFFUSION: ICD-10-CM

## 2017-05-12 DIAGNOSIS — R07.89 ANTERIOR CHEST WALL PAIN: ICD-10-CM

## 2017-05-12 DIAGNOSIS — E11.9 TYPE 2 DIABETES MELLITUS WITHOUT COMPLICATION, WITHOUT LONG-TERM CURRENT USE OF INSULIN: Chronic | ICD-10-CM

## 2017-05-12 DIAGNOSIS — R19.7 DIARRHEA: ICD-10-CM

## 2017-05-12 DIAGNOSIS — R91.1 LUNG NODULE: Chronic | ICD-10-CM

## 2017-05-12 LAB
POCT GLUCOSE: 134 MG/DL (ref 70–110)
POCT GLUCOSE: 147 MG/DL (ref 70–110)
POCT GLUCOSE: 349 MG/DL (ref 70–110)

## 2017-05-12 PROCEDURE — 32550 INSERT PLEURAL CATH: CPT | Mod: ,,, | Performed by: INTERNAL MEDICINE

## 2017-05-12 PROCEDURE — 99152 MOD SED SAME PHYS/QHP 5/>YRS: CPT | Performed by: INTERNAL MEDICINE

## 2017-05-12 PROCEDURE — 27000489 HC PLEURY PATIENT STARTER KIT: Performed by: INTERNAL MEDICINE

## 2017-05-12 PROCEDURE — 99153 MOD SED SAME PHYS/QHP EA: CPT | Performed by: INTERNAL MEDICINE

## 2017-05-12 PROCEDURE — 63600175 PHARM REV CODE 636 W HCPCS: Performed by: INTERNAL MEDICINE

## 2017-05-12 PROCEDURE — 99244 OFF/OP CNSLTJ NEW/EST MOD 40: CPT | Mod: S$GLB,,, | Performed by: ANESTHESIOLOGY

## 2017-05-12 PROCEDURE — 32552 REMOVE LUNG CATHETER: CPT | Performed by: INTERNAL MEDICINE

## 2017-05-12 PROCEDURE — 99999 PR PBB SHADOW E&M-EST. PATIENT-LVL IV: CPT | Mod: PBBFAC,,, | Performed by: ANESTHESIOLOGY

## 2017-05-12 PROCEDURE — 82962 GLUCOSE BLOOD TEST: CPT

## 2017-05-12 PROCEDURE — 32557 INSERT CATH PLEURA W/ IMAGE: CPT | Performed by: INTERNAL MEDICINE

## 2017-05-12 PROCEDURE — C1729 CATH, DRAINAGE: HCPCS | Performed by: INTERNAL MEDICINE

## 2017-05-12 RX ORDER — HYDROMORPHONE HYDROCHLORIDE 1 MG/ML
INJECTION, SOLUTION INTRAMUSCULAR; INTRAVENOUS; SUBCUTANEOUS CODE/TRAUMA/SEDATION MEDICATION
Status: COMPLETED | OUTPATIENT
Start: 2017-05-12 | End: 2017-05-12

## 2017-05-12 RX ORDER — GABAPENTIN 300 MG/1
300 CAPSULE ORAL 3 TIMES DAILY
Status: DISCONTINUED | OUTPATIENT
Start: 2017-05-12 | End: 2017-05-12 | Stop reason: HOSPADM

## 2017-05-12 RX ORDER — HYDROCODONE BITARTRATE AND ACETAMINOPHEN 10; 325 MG/1; MG/1
1 TABLET ORAL EVERY 6 HOURS PRN
Status: DISCONTINUED | OUTPATIENT
Start: 2017-05-12 | End: 2017-05-12 | Stop reason: HOSPADM

## 2017-05-12 RX ORDER — FENTANYL CITRATE 50 UG/ML
INJECTION, SOLUTION INTRAMUSCULAR; INTRAVENOUS CODE/TRAUMA/SEDATION MEDICATION
Status: COMPLETED | OUTPATIENT
Start: 2017-05-12 | End: 2017-05-12

## 2017-05-12 RX ORDER — LIDOCAINE 50 MG/G
1 PATCH TOPICAL DAILY
Qty: 30 PATCH | Refills: 2 | Status: SHIPPED | OUTPATIENT
Start: 2017-05-12

## 2017-05-12 RX ORDER — MIDAZOLAM HYDROCHLORIDE 5 MG/ML
INJECTION INTRAMUSCULAR; INTRAVENOUS CODE/TRAUMA/SEDATION MEDICATION
Status: COMPLETED | OUTPATIENT
Start: 2017-05-12 | End: 2017-05-12

## 2017-05-12 RX ADMIN — FENTANYL CITRATE 50 MCG: 50 INJECTION, SOLUTION INTRAMUSCULAR; INTRAVENOUS at 08:05

## 2017-05-12 RX ADMIN — MIDAZOLAM 3 MG: 5 INJECTION INTRAMUSCULAR; INTRAVENOUS at 08:05

## 2017-05-12 RX ADMIN — Medication 0.5 MG: at 08:05

## 2017-05-12 RX ADMIN — FENTANYL CITRATE 25 MCG: 50 INJECTION, SOLUTION INTRAMUSCULAR; INTRAVENOUS at 08:05

## 2017-05-12 RX ADMIN — MIDAZOLAM 1 MG: 5 INJECTION INTRAMUSCULAR; INTRAVENOUS at 08:05

## 2017-05-12 NOTE — MR AVS SNAPSHOT
[facility]  [address]  [place] [address]  Phone: [phone]  Fax: [phone]                  Lashay Whitley   2017 1:30 PM   Office Visit    Description:  Female : 1960   Provider:  Kavita Crump MD   Department:  [facility]                To Do List           Future Appointments        Provider Department Dept Phone    2017 10:40 AM Tanner Leos MD [facility] - Cardiology [phone]      Goals (5 Years of Data)     None       These Medications        Disp Refills Start End    lidocaine (LIDODERM) 5 % 30 patch 2 2017     Place 1 patch onto the skin once daily. Remove & Discard patch within 12 hours or as directed by MD - Transdermal    Pharmacy: [facility] [address] Ph #: [phone]         [facility]     [facility] -  Assistance  Unless otherwise directed by your provider, please contact [facility], our nurse care line that is available for  assistance.     Registered nurses in the [facility] provide: appointment scheduling, clinical advisement, health education, and other advisory services.  Call: [phone] (toll free)               Medications           Message regarding Medications     Verify the changes and/or additions to your medication regime listed below are the same as discussed with your clinician today.  If any of these changes or additions are incorrect, please notify your healthcare provider.        START taking these NEW medications        Refills    lidocaine (LIDODERM) 5 % 2    Sig: Place 1 patch onto the skin once daily. Remove & Discard patch within 12 hours or as directed by MD    Class: Normal    Route: Transdermal           Verify that the below list of medications is an accurate representation of the medications you are currently taking.  If none reported, the list may be blank. If  "incorrect, please contact your healthcare provider. Carry this list with you in case of emergency.           Current Medications     albuterol 90 mcg/actuation inhaler Inhale 1-2 puffs into the lungs every 6 (six) hours as needed for Wheezing or Shortness of Breath. Rescue    ascorbic acid, vitamin C, (VITAMIN C) 500 MG tablet Take 500 mg by mouth 2 (two) times daily.    aspirin (ECOTRIN) 81 MG EC tablet Take 81 mg by mouth once daily.    atorvastatin (LIPITOR) 40 MG tablet Take 1 tablet (40 mg total) by mouth once daily.    BD ULTRA-FINE EDWIN PEN NEEDLES 32 x 5/32 " Ndle Uses 5 times daily, on multiple daily insulin injections    diazePAM (VALIUM) 5 MG tablet Take 1 tablet (5 mg total) by mouth every 12 (twelve) hours as needed (muscle spasms).    gabapentin (NEURONTIN) 300 MG capsule Take 1 capsule (300 mg total) by mouth 3 (three) times daily.    hydrocodone-acetaminophen 10-325mg (NORCO)  mg Tab Take 1 tablet by mouth every 4 (four) hours as needed.    HYDROmorphone (DILAUDID) 4 MG tablet Take 1 tablet (4 mg total) by mouth every 3 (three) hours as needed.    insulin aspart (NOVOLOG FLEXPEN) 100 unit/mL InPn pen Inject 6 Units into the skin 3 (three) times daily with meals. With correction scale for 33 units max TDD    insulin glargine, TOUJEO, (TOUJEO SOLOSTAR) 300 unit/mL (1.5 mL) InPn pen Inject 4 Units into the skin 2 (two) times daily.    ipratropium (ATROVENT) 0.02 % nebulizer solution Take 2.5 mLs (500 mcg total) by nebulization 4 (four) times daily. Rescue    levothyroxine (SYNTHROID) 100 MCG tablet Take 1 tablet (100 mcg total) by mouth once daily.    lidocaine (LIDODERM) 5 % Place 1 patch onto the skin once daily. Remove & Discard patch within 12 hours or as directed by MD    lipase-protease-amylase 36,000-114,000- 180,000 unit CpDR Take 3 capsules by mouth 3 (three) times daily with meals.    loratadine (CLARITIN) 10 mg tablet Take 10 mg by mouth once daily.    methadone (DOLOPHINE) 5 MG tablet " "Take 1 tablet (5 mg total) by mouth every 8 (eight) hours.    metoclopramide HCl (REGLAN) 10 MG tablet Take 1 tablet (10 mg total) by mouth 4 (four) times daily before meals and nightly.    metoprolol tartrate (LOPRESSOR) 25 MG tablet Take 0.5 tablets (12.5 mg total) by mouth 2 (two) times daily.    multivitamin capsule Take 1 capsule by mouth once daily.    nitroGLYCERIN (NITROSTAT) 0.4 MG SL tablet Place 1 tablet (0.4 mg total) under the tongue every 5 (five) minutes as needed for Chest pain.    ondansetron (ZOFRAN-ODT) 8 MG TbDL Take 1 tablet (8 mg total) by mouth every 6 (six) hours as needed.    pantoprazole (PROTONIX) 40 MG tablet Take 1 tablet (40 mg total) by mouth once daily.    predniSONE (DELTASONE) 20 MG tablet Take 2 tablets (40 mg total) by mouth once daily.    vitamin D 1000 units Tab Take 185 mg by mouth once daily.           Clinical Reference Information           Your Vitals Were     BP Pulse Temp Resp Height Weight    123/74 61 97.5 °F (36.4 °C) (Oral) 20 4' 11" (1.499 m) 45.4 kg (100 lb)    BMI                20.2 kg/m2          Blood Pressure          Most Recent Value    BP  123/74      Allergies as of 5/12/2017     Penicillins    Demerol [Meperidine]    Dilaudid [Hydromorphone (Bulk)]    Percocet [Oxycodone-acetaminophen]    Adhesive Tape-silicones      Immunizations Administered on Date of Encounter - 5/12/2017     None      Language Assistance Services     ATTENTION: Language assistance services are available, free of charge. Please call [phone].      ATENCIÓN: Si habla español, tiene a mcleod disposición servicios gratuitos de asistencia lingüística. Llame al [phone].     CHÚ Ý: N?u b?n nói Ti?ng Vi?t, có các d?ch v? h? tr? ngôn ng? mi?n phí dành cho b?n. G?i s? [phone].         [facility] complies with applicable Federal civil rights laws and does not discriminate on the basis of race, color, national origin, age, disability, or sex.        "

## 2017-05-12 NOTE — PROGRESS NOTES
Chronic Pain - New Consult    Referring Physician: Wendi Nguyễn MD    Chief Complaint: No chief complaint on file.       SUBJECTIVE: Disclaimer: This note has been generated using voice-recognition software. There may be typographical errors that have been missed during proof-reading    Initial encounter:    Lashay Whitley presents to the clinic for the evaluation of cancer associated pain.    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   Started on FOLFIRINOX as neoadjuvant therapy and completed 4 cycles.  Started on neoadjuvant chemotherapy and XRT which was completed with some complications.  Underwent a Whipple procedure on 4/12/16.   Pathology was reviewed.   Specimens: Head of pancreas, duodenum, common bile duct, gallbladder  -Procedure: Pancreaticoduodenectomy (Whipple resection), partial pancreatectomy  -Tumor site: Pancreatic head  -Tumor size: 20 mm  -Histologic type: Ductal adenocarcinoma  -Histologic grade: Well-differentiated (grade 1 )  -Microscopic tumor extension: Tumor invades peripancreatic soft tissues  -Margins:  -Margins are negative for invasive carcinoma  -Distance to closest distance to closest retroperitoneal margin is 3 mm  -Distance to closest SMV margin is 1.5 mm  -Distance to closest pancreatic margin is 12 mm  -Treatment effect: Present, residual cancer with tumor regression but more than single cells or rare small groups of  cells (partial response, score 2)  -No lymphovascular invasion  -Perineural invasion is present  -Pathologic staging: ypT3 N1 MX  -Lymph nodes:  -Total number of lymph nodes examined: 25  -Total number of lymph nodes involved: 1  She started adjuvant gemcitabine but this was discontinued because of poor tolerance.  - she recurred with metastatic disease recently and has had rising   EUS with liver biopsy on 11/22/16 confirmed metastatic disease  CT scans reviewed as well  Bone involvement in T  spine required XRT    Pain Description:    The pain is located in the thoracic area at approximately T4/5.      At BEST  10/10     At WORST  10/10 on the WORST day.      On average pain is rated as 10/10.     Today the pain is rated as 10/10    The pain is described as aching, boring, sharp, shooting and throbbing      Symptoms interfere with daily activity and sleeping.     Exacerbating factors: Sitting, Standing, Laying, Bending, Touching, Eating, Walking, Night Time, Morning, Extension, Flexing, Lifting and Getting out of bed/chair.      Mitigating factors medications.     Patient denies urinary incontinence and bowel incontinence.  Patient denies any suicidal or homicidal ideations    Pain Medications:  Current:  Gabapentin 900mg  Hydrocodone/acetaminophen  Hydromorphone 4mg   Methadone    Physical Therapy/Home Exercise: no       report:  Reviewed and consistent with medication use as prescribed.    Pain Procedures: none recently      Imaging:   MRI cervical / thoracic / lumbar spine   Exam: MRI of the cervical and thoracic spine with and without contrast    History: Patient is a history of metastatic pancreatic cancer to the spine.  Patient has received radiation therapy spine.  Comparison is made to the prior MRI examination of the thoracic spine 12/08/2016.    Findings: MRI of the cervical and thoracic spine were performed with and without contrast.  There has been progression of the metastatic involvement compared with the prior MRI multiple levels.  The present study demonstrates multiple cervical and thoracic vertebrae that have focal low T1 signal, including C5, C6, C7, T1, T3, T4 and T5.  The mid and lower thoracic spine all have more homogeneously reduced marrow signal which may indicate diffuse marrow involvement marrow replacement.      Once again the T4 vertebra has diffuse marrow replacement and a compression fracture with 25% height loss which is slightly worse than before, and there is now more  posterior element involvement.  Following contrast administration, there is circumferential epidural enhancement centered at T4 surrounding the cord and effacing the CSF, narrowing the canal producing spinal stenosis.  The epidural enhancement extends slightly above and below the T4 level.  No additional new fractures are seen in the cervical and thoracic spine.    Degenerative changes are also present in the cervical spine with degenerative disc disease and spondylosis at C5-C6 without canal stenosis, but there is mild bilateral foramen narrowing.  Mild central disc bulges are present at C2-C3, C3-C4 and C4-C5.      In the thoracic spine the patient once again has a posterior disc protrusions at C2-C3 and especially C3-C4 which adds to the narrowing of the spinal canal.      Moderate-sized bilateral pleural effusions are present.   Impression        MRI of the cervical and thoracic spine demonstrates progressive involvement of metastatic disease compared to the prior MRI with more levels now involved with marrow placement.  There is slightly worse compression fracture and more epidural involvement at T4 effacing the CSF around the spinal cord.               Past Medical History:   Diagnosis Date    Breast cancer     bilateral    Broken rib     right    CAD (coronary artery disease)     Cataract     Centrilobular emphysema: per CT 2016 9/29/2016    Colon polyps     Coronary atherosclerosis of native coronary artery 4/24/2015    2 stents    Diabetes mellitus type 2 in nonobese     Diverticulosis of large intestine without hemorrhage 10/23/2015    Encounter for blood transfusion     Gastroesophageal reflux disease with esophagitis 10/23/2015    GI bleed     Hyperlipidemia     Hypertension     Myocardial infarction 2011    Myocardial infarction     Pancreas cancer     Pancreatitis     PONV (postoperative nausea and vomiting)     Type 2 diabetes mellitus with hyperglycemia 11/3/2015    Unspecified  essential hypertension 4/24/2015    Vertebral fracture, pathological     l3, right side     Past Surgical History:   Procedure Laterality Date    APPENDECTOMY      BREAST RECONSTRUCTION      post mast    CARDIAC CATHETERIZATION  10/29/11    CATARACT EXTRACTION W/  INTRAOCULAR LENS IMPLANT Left 04/09/2015    Dr. Warner    CATARACT EXTRACTION W/  INTRAOCULAR LENS IMPLANT Right 05/14/15    Dr Warner    COLONOSCOPY N/A 10/19/2016    Procedure: COLONOSCOPY;  Surgeon: Alexis Tubbs MD;  Location: [facility] ([address] [place] [address]);  Service: Endoscopy;  Laterality: N/A;    CORONARY ANGIOPLASTY WITH STENT PLACEMENT  09/08/2011    x2    HYSTERECTOMY      MASTECTOMY Bilateral     bilateral    RK/AK Bilateral     TONSILLECTOMY      WHIPPLE PROCEDURE W/ LAPAROSCOPY  4/2016     Social History     Social History    Marital status:      Spouse name: N/A    Number of children: N/A    Years of education: N/A     Occupational History    Not on file.     Social History Main Topics    Smoking status: Former Smoker     Packs/day: 1.50     Years: 40.00     Types: Cigarettes     Quit date: 10/1/2015    Smokeless tobacco: Never Used      Comment: currently vapes    Alcohol use No    Drug use: No    Sexual activity: Yes     Partners: Male     Other Topics Concern    Not on file     Social History Narrative     Family History   Problem Relation Age of Onset    Colon cancer Father 60    Cancer Father     Crohn's disease Sister     Diabetes Sister     Cancer Sister      breast    Hypertension Mother     Macular degeneration Mother     Cancer Mother      breast    Diabetes Mother     Cancer Sister      breast    Diabetes Sister     Heart disease Brother     Diabetes Maternal Aunt     Diabetes Maternal Grandmother     Ulcerative colitis Neg Hx        Review of patient's allergies indicates:   Allergen Reactions    Penicillins Swelling    Demerol [meperidine] Swelling    Dilaudid [hydromorphone (bulk)] Other (See  "Comments)     Redness  ------------pt states no longer allergic bc she takes dilaudid pills at home    Percocet [oxycodone-acetaminophen] Swelling    Adhesive tape-silicones Rash       Current Outpatient Prescriptions   Medication Sig    albuterol 90 mcg/actuation inhaler Inhale 1-2 puffs into the lungs every 6 (six) hours as needed for Wheezing or Shortness of Breath. Rescue    ascorbic acid, vitamin C, (VITAMIN C) 500 MG tablet Take 500 mg by mouth 2 (two) times daily.    aspirin (ECOTRIN) 81 MG EC tablet Take 81 mg by mouth once daily.    atorvastatin (LIPITOR) 40 MG tablet Take 1 tablet (40 mg total) by mouth once daily.    BD ULTRA-FINE EDWIN PEN NEEDLES 32 x 5/32 " Ndle Uses 5 times daily, on multiple daily insulin injections    gabapentin (NEURONTIN) 300 MG capsule Take 1 capsule (300 mg total) by mouth 3 (three) times daily.    hydrocodone-acetaminophen 10-325mg (NORCO)  mg Tab Take 1 tablet by mouth every 4 (four) hours as needed.    HYDROmorphone (DILAUDID) 4 MG tablet Take 1 tablet (4 mg total) by mouth every 3 (three) hours as needed.    insulin aspart (NOVOLOG FLEXPEN) 100 unit/mL InPn pen Inject 6 Units into the skin 3 (three) times daily with meals. With correction scale for 33 units max TDD (Patient taking differently: Inject 5 Units into the skin 3 (three) times daily with meals. With correction scale for 33 units max TDD)    insulin glargine, TOUJEO, (TOUJEO SOLOSTAR) 300 unit/mL (1.5 mL) InPn pen Inject 4 Units into the skin 2 (two) times daily.    ipratropium (ATROVENT) 0.02 % nebulizer solution Take 2.5 mLs (500 mcg total) by nebulization 4 (four) times daily. Rescue    levothyroxine (SYNTHROID) 100 MCG tablet Take 1 tablet (100 mcg total) by mouth once daily.    loratadine (CLARITIN) 10 mg tablet Take 10 mg by mouth once daily.    methadone (DOLOPHINE) 5 MG tablet Take 1 tablet (5 mg total) by mouth every 8 (eight) hours.    metoclopramide HCl (REGLAN) 10 MG tablet Take 1 " tablet (10 mg total) by mouth 4 (four) times daily before meals and nightly.    metoprolol tartrate (LOPRESSOR) 25 MG tablet Take 0.5 tablets (12.5 mg total) by mouth 2 (two) times daily.    multivitamin capsule Take 1 capsule by mouth once daily.    nitroGLYCERIN (NITROSTAT) 0.4 MG SL tablet Place 1 tablet (0.4 mg total) under the tongue every 5 (five) minutes as needed for Chest pain.    ondansetron (ZOFRAN-ODT) 8 MG TbDL Take 1 tablet (8 mg total) by mouth every 6 (six) hours as needed.    pantoprazole (PROTONIX) 40 MG tablet Take 1 tablet (40 mg total) by mouth once daily.    predniSONE (DELTASONE) 20 MG tablet Take 2 tablets (40 mg total) by mouth once daily.    vitamin D 1000 units Tab Take 185 mg by mouth once daily.    diazePAM (VALIUM) 5 MG tablet Take 1 tablet (5 mg total) by mouth every 12 (twelve) hours as needed (muscle spasms).    lidocaine (LIDODERM) 5 % Place 1 patch onto the skin once daily. Remove & Discard patch within 12 hours or as directed by MD    lipase-protease-amylase 36,000-114,000- 180,000 unit CpDR Take 3 capsules by mouth 3 (three) times daily with meals.     No current facility-administered medications for this visit.        REVIEW OF SYSTEMS:    GENERAL:  No weight loss, malaise or fevers.  HEENT:   No recent changes in vision or hearing  NECK:  Negative for lumps, no difficulty with swallowing.  RESPIRATORY:  SOB, pleural effusion - recent cath placed.  CARDIOVASCULAR:  Negative for chest pain, leg swelling or palpitations.  GI:  Negative for abdominal discomfort, blood in stools or black stools or change in bowel habits. GERD, controlled, nausea - ondansetron  MUSCULOSKELETAL:  See HPI.  SKIN:  Negative for lesions, rash, and itching.  PSYCH:  No mood disorder or recent psychosocial stressors.  Patients sleep is disturbed secondary to pain.  HEMATOLOGY/LYMPHOLOGY:  Negative for prolonged bleeding, bruising easily or swollen nodes.  On plavix, recently stopped  ENDO: IDDM  "and hypothyroidism  NEURO:   No history of headaches, syncope, paralysis, seizures or tremors.  All other reviewed and negative other than HPI.    OBJECTIVE:    /74  Pulse 61  Temp 97.5 °F (36.4 °C) (Oral)   Resp 20  Ht 4' 11" (1.499 m)  Wt 45.4 kg (100 lb)  BMI 20.2 kg/m2    PHYSICAL EXAMINATION:    GENERAL: Well appearing, in no acute distress, alert and oriented x3.  PSYCH:  Mood and affect appropriate.  SKIN: Skin color, texture, turgor normal, no rashes or lesions.  HEAD/FACE:  Normocephalic, atraumatic. Cranial nerves grossly intact.  CV: RRR with palpation of the radial artery.  PULM: on O2, with pleurx catheter.  BACK: Pain to palpation over the paraspinal thoracic and spinous processes at T4/5  EXTREMITIES: Peripheral joint ROM is full and pain free without obvious instability or laxity in all four extremities. No deformities, edema, or skin discoloration. Good capillary refill.  MUSCULOSKELETAL:   There is  pain with palpation over the sacroiliac joints bilaterally.  There is no pain to palpation over the greater trochanteric bursa bilaterally.  FABERs test is positive.  FADIRs test is negative.   Bilateral lower extremity strength is normal and symmetric.  No atrophy or tone abnormalities are noted.  NEURO: Bilateral lower extremity coordination and muscle stretch reflexes are physiologic and symmetric.  Plantar response are downgoing. No clonus.  No loss of sensation is noted.  GAIT: Antalgic, uses wheelchair    ASSESSMENT: 57 y.o. year old female with pain, consistent with     Encounter Diagnoses   Name Primary?    Thoracic compression fracture, closed, initial encounter Yes    Liver metastases     Bone metastases     Anterior chest wall pain        PLAN:   We will attempt to coordinate care with hospice    Trigger Point Injection:   The procedure was discussed with the patient including complications of nerve damage,  bleeding, infection, and failure of pain relief.   Trigger points " were identified by palpation and marked. Chlorhexidine prep of sites done. A mixture of 9mL 0.25% bupivacaine +40mg Depo-Medrol was prepared (10 mL total).   A 27-gauge needle was advanced to the point of maximal tenderness, and  1.5 mL  was injected after negative aspiration. All sites done in the same manner. Patient tolerated the procedure well and without complications. Sites injected included: paraspinal muscles on the left side  at approximately T4-T5    Lidocaine patches to be placed over the chest port    I will schedule the patient for a thoracic epidural at T6    I will contact Dr. Ochoa to evaluate for possible thoracic kyphoplasty at the level of T4 with a compression fracture exists.    Continue holding Plavix    Escalate gabapentin to 1800 mg per day    Greater than 25 minutes spent in total in todays visit with the patient, with more than half that time direct face to face counseling and education with the patient today. We discussed the disease process, prognosis, treatment plan, and risks and benefits.     Kavita Crump  05/12/2017

## 2017-05-12 NOTE — SEDATION DOCUMENTATION
H and P updated-yes  Anesthesia Plan:  conscious sedation   ASA verified-yes  Airway exam performed-yes  Personal or Family history of anesthesia complications-No  Consent signed and witnessed, Nona Lopez RN

## 2017-05-12 NOTE — IP AVS SNAPSHOT
[facility]  [address]  [facility] [address]  Phone: [phone]           Patient Discharge Instructions   Our goal is to set you up for success. This packet includes information on your condition, medications, and your home care.  It will help you care for yourself to prevent having to return to the hospital.     Please ask your nurse if you have any questions.      There are many details to remember when preparing to leave the hospital. Here is what you will need to do:    1. Take your medicine. If you are prescribed medications, review your Medication List on the following pages. You may have new medications to  at the pharmacy and others that you'll need to stop taking. Review the instructions for how and when to take your medications. Talk with your doctor or nurses if you are unsure of what to do.     2. Go to your follow-up appointments. Specific follow-up information is listed in the following pages. Your may be contacted by a nurse or clinical provider about future appointments. Be sure we have all of the phone numbers to reach you. Please contact your provider's office if you are unable to make an appointment.     3. Watch for warning signs. Your doctor or nurse will give you detailed warning signs to watch for and when to call for assistance. These instructions may also include educational information about your condition. If you experience any of warning signs to your health, call your doctor.           [facility]  Unless otherwise directed by your provider, please   contact [facility], our nurse care line   that is available for 24/7 assistance.     [phone] (toll-free)     Registered nurses in the [facility]   provide: appointment scheduling, clinical advisement, health education, and other advisory services.                  ** Verify the list of medication(s) below is accurate and up to date. Carry this with you in case of  "emergency. If your medications have changed, please notify your healthcare provider.             Medication List      CHANGE how you take these medications        Additional Info                      insulin aspart 100 unit/mL Inpn pen   Commonly known as:  NOVOLOG FLEXPEN   Quantity:  1 Box   Refills:  6   Dose:  6 Units   What changed:    - how much to take  - additional instructions    Instructions:  Inject 6 Units into the skin 3 (three) times daily with meals. With correction scale for 33 units max TDD     Begin Date    AM    Noon    PM    Bedtime         CONTINUE taking these medications        Additional Info                      albuterol 90 mcg/actuation inhaler   Quantity:  1 Inhaler   Refills:  1   Dose:  1-2 puff    Instructions:  Inhale 1-2 puffs into the lungs every 6 (six) hours as needed for Wheezing or Shortness of Breath. Rescue     Begin Date    AM    Noon    PM    Bedtime       aspirin 81 MG EC tablet   Commonly known as:  ECOTRIN   Refills:  0   Dose:  81 mg    Instructions:  Take 81 mg by mouth once daily.     Begin Date    AM    Noon    PM    Bedtime       atorvastatin 40 MG tablet   Commonly known as:  LIPITOR   Quantity:  30 tablet   Refills:  2   Dose:  40 mg    Instructions:  Take 1 tablet (40 mg total) by mouth once daily.     Begin Date    AM    Noon    PM    Bedtime       BD ULTRA-FINE EDWIN PEN NEEDLES 32 gauge x 5/32" Ndle   Quantity:  150 each   Refills:  12   Generic drug:  pen needle, diabetic    Instructions:  Uses 5 times daily, on multiple daily insulin injections     Begin Date    AM    Noon    PM    Bedtime       diazePAM 5 MG tablet   Commonly known as:  VALIUM   Quantity:  30 tablet   Refills:  1   Dose:  5 mg    Instructions:  Take 1 tablet (5 mg total) by mouth every 12 (twelve) hours as needed (muscle spasms).     Begin Date    AM    Noon    PM    Bedtime       gabapentin 300 MG capsule   Commonly known as:  NEURONTIN   Quantity:  90 capsule   Refills:  2   Dose:  300 mg "    Instructions:  Take 1 capsule (300 mg total) by mouth 3 (three) times daily.     Begin Date    AM    Noon    PM    Bedtime       hydrocodone-acetaminophen 10-325mg  mg Tab   Commonly known as:  NORCO   Quantity:  60 tablet   Refills:  0   Dose:  1 tablet    Instructions:  Take 1 tablet by mouth every 4 (four) hours as needed.     Begin Date    AM    Noon    PM    Bedtime       HYDROmorphone 4 MG tablet   Commonly known as:  DILAUDID   Quantity:  60 tablet   Refills:  0   Dose:  4 mg    Instructions:  Take 1 tablet (4 mg total) by mouth every 3 (three) hours as needed.     Begin Date    AM    Noon    PM    Bedtime       insulin glargine (TOUJEO) 300 unit/mL (1.5 mL) Inpn pen   Commonly known as:  TOUJEO SOLOSTAR   Quantity:  1 Syringe   Refills:  12   Dose:  4 Units    Instructions:  Inject 4 Units into the skin 2 (two) times daily.     Begin Date    AM    Noon    PM    Bedtime       ipratropium 0.02 % nebulizer solution   Commonly known as:  ATROVENT   Quantity:  30 vial   Refills:  1   Dose:  500 mcg    Instructions:  Take 2.5 mLs (500 mcg total) by nebulization 4 (four) times daily. Rescue     Begin Date    AM    Noon    PM    Bedtime       levothyroxine 100 MCG tablet   Commonly known as:  SYNTHROID   Quantity:  30 tablet   Refills:  11   Dose:  100 mcg    Instructions:  Take 1 tablet (100 mcg total) by mouth once daily.     Begin Date    AM    Noon    PM    Bedtime       lipase-protease-amylase 36,000-114,000- 180,000 unit Cpdr   Quantity:  270 capsule   Refills:  5   Dose:  3 capsule   Indications:  Pancreatic Insufficiency    Instructions:  Take 3 capsules by mouth 3 (three) times daily with meals.     Begin Date    AM    Noon    PM    Bedtime       loratadine 10 mg tablet   Commonly known as:  CLARITIN   Refills:  0   Dose:  10 mg    Instructions:  Take 10 mg by mouth once daily.     Begin Date    AM    Noon    PM    Bedtime       methadone 5 MG tablet   Commonly known as:  DOLOPHINE   Quantity:  90  tablet   Refills:  0   Dose:  5 mg    Instructions:  Take 1 tablet (5 mg total) by mouth every 8 (eight) hours.     Begin Date    AM    Noon    PM    Bedtime       metoclopramide HCl 10 MG tablet   Commonly known as:  REGLAN   Quantity:  90 tablet   Refills:  1   Dose:  10 mg    Instructions:  Take 1 tablet (10 mg total) by mouth 4 (four) times daily before meals and nightly.     Begin Date    AM    Noon    PM    Bedtime       metoprolol tartrate 25 MG tablet   Commonly known as:  LOPRESSOR   Quantity:  30 tablet   Refills:  2   Dose:  12.5 mg    Instructions:  Take 0.5 tablets (12.5 mg total) by mouth 2 (two) times daily.     Begin Date    AM    Noon    PM    Bedtime       multivitamin capsule   Refills:  0   Dose:  1 capsule    Instructions:  Take 1 capsule by mouth once daily.     Begin Date    AM    Noon    PM    Bedtime       nitroGLYCERIN 0.4 MG SL tablet   Commonly known as:  NITROSTAT   Quantity:  30 tablet   Refills:  0   Dose:  0.4 mg    Instructions:  Place 1 tablet (0.4 mg total) under the tongue every 5 (five) minutes as needed for Chest pain.     Begin Date    AM    Noon    PM    Bedtime       ondansetron 8 MG Tbdl   Commonly known as:  ZOFRAN-ODT   Quantity:  100 tablet   Refills:  1   Dose:  8 mg    Instructions:  Take 1 tablet (8 mg total) by mouth every 6 (six) hours as needed.     Begin Date    AM    Noon    PM    Bedtime       pantoprazole 40 MG tablet   Commonly known as:  PROTONIX   Quantity:  30 tablet   Refills:  2   Dose:  40 mg    Instructions:  Take 1 tablet (40 mg total) by mouth once daily.     Begin Date    AM    Noon    PM    Bedtime       predniSONE 20 MG tablet   Commonly known as:  DELTASONE   Quantity:  60 tablet   Refills:  0   Dose:  40 mg    Instructions:  Take 2 tablets (40 mg total) by mouth once daily.     Begin Date    AM    Noon    PM    Bedtime       VITAMIN C 500 MG tablet   Refills:  0   Dose:  500 mg   Generic drug:  ascorbic acid (vitamin C)    Instructions:  Take 500  mg by mouth 2 (two) times daily.     Begin Date    AM    Noon    PM    Bedtime       vitamin D 1000 units Tab   Refills:  0   Dose:  185 mg    Instructions:  Take 185 mg by mouth once daily.     Begin Date    AM    Noon    PM    Bedtime                  Please bring to all follow up appointments:    1. A copy of your discharge instructions.  2. All medicines you are currently taking in their original bottles.  3. Identification and insurance card.    Please arrive 15 minutes ahead of scheduled appointment time.    Please call 24 hours in advance if you must reschedule your appointment and/or time.        Your Scheduled Appointments     May 12, 2017  1:30 PM CDT   Consult with Kavita Crump MD   [facility] - [facility] ([facility])    [address]  [facility] [id number] [phone]            May 23, 2017 10:40 AM CDT   Established Patient Visit with MD Valentín Pizano [facility] - Cardiology ([facility] )    [address]  [place] [id number] [phone]              Follow-up Information     Call in 1 week to follow up.        Discharge Instructions     Future Orders    Diet general     Questions:    Total calories:      Fat restriction, if any:      Protein restriction, if any:      Na restriction, if any:      Fluid restriction:      Additional restrictions:          Discharge Instructions         Procedural Sedation (Adult)  You have been given medicine by vein to make you sleep during your surgery. This may have included both a pain medicine and sleeping medicine. Most of the effects have worn off. But you may still have some drowsiness for the next 6 to 8 hours.  Home care  Follow these guidelines when you get home:  · For the next 8 hours, you should be watched by a responsible adult. This person should make sure your condition is not getting worse.  · Don't take any medicine by mouth for pain or for sleep during the next 4 hours. These might react with the  "medicines you were given in the hospital. This could cause a much stronger response than usual.  · Don't drink any alcohol for the next 24 hours.  · Don't drive, operate dangerous machinery, or make important business or personal decisions during the next 24 hours.  Follow-up care  Follow up with your healthcare provider if you are not alert and back to your usual level of activity within 12 hours.  When to seek medical advice  Call your healthcare provider right away if any of these occur:  · Drowsiness gets worse  · Weakness or dizziness gets worse  · Repeated vomiting  · You cannot be awakened   Date Last Reviewed: 10/18/2016  © 5912-8564 [employer]. [address] [place] [address], [place] [id number]. All rights reserved. This information is not intended as a substitute for professional medical care. Always follow your healthcare professional's instructions.            Primary Diagnosis     Your primary diagnosis was:  Fluid In Pleural Cavity      Admission Information     Date & Time Provider Department CSN    5/12/2017  6:38 AM Natasha Metcalf MD [facility] [id number]      Care Providers     Provider Role Specialty Primary office phone    Natasha Metcalf MD Attending Provider Intensive Care [phone]    [facility] Provider Surgeon  -- Number not on file      Your Vitals Were     BP Pulse Temp Resp Height Weight    144/73 78 98.1 °F (36.7 °C) (Oral) 22 4' 11" (1.499 m) 45.4 kg (100 lb)    SpO2 BMI             100% 20.2 kg/m2         Recent Lab Values        8/1/2015 10/26/2015 3/7/2016 8/25/2016 10/19/2016 12/6/2016 2/23/2017 5/8/2017      8:15 AM  9:30 PM 10:29 AM  1:53 PM  9:20 AM  8:30 PM 11:00 AM  3:31 AM    A1C 7.4 (H) 7.5 (H) 6.1 5.5 5.5 5.8 5.8 5.7    Comment for A1C at  1:53 PM on 8/25/2016:  According to ADA guidelines, hemoglobin A1C <7.0% represents  optimal control in non-pregnant diabetic patients.  Different  metrics may apply to specific populations.   Standards of " Medical Care in Diabetes - 2016.  For the purpose of screening for the presence of diabetes:  <5.7%     Consistent with the absence of diabetes  5.7-6.4%  Consistent with increasing risk for diabetes   (prediabetes)  >or=6.5%  Consistent with diabetes  Currently no consensus exists for use of hemoglobin A1C  for diagnosis of diabetes for children.      Comment for A1C at  9:20 AM on 10/19/2016:  According to ADA guidelines, hemoglobin A1C <7.0% represents  optimal control in non-pregnant diabetic patients.  Different  metrics may apply to specific populations.   Standards of Medical Care in Diabetes - 2016.  For the purpose of screening for the presence of diabetes:  <5.7%     Consistent with the absence of diabetes  5.7-6.4%  Consistent with increasing risk for diabetes   (prediabetes)  >or=6.5%  Consistent with diabetes  Currently no consensus exists for use of hemoglobin A1C  for diagnosis of diabetes for children.      Comment for A1C at  8:30 PM on 12/6/2016:  According to ADA guidelines, hemoglobin A1C <7.0% represents  optimal control in non-pregnant diabetic patients.  Different  metrics may apply to specific populations.   Standards of Medical Care in Diabetes - 2016.  For the purpose of screening for the presence of diabetes:  <5.7%     Consistent with the absence of diabetes  5.7-6.4%  Consistent with increasing risk for diabetes   (prediabetes)  >or=6.5%  Consistent with diabetes  Currently no consensus exists for use of hemoglobin A1C  for diagnosis of diabetes for children.      Comment for A1C at 11:00 AM on 2/23/2017:  According to ADA guidelines, hemoglobin A1C <7.0% represents  optimal control in non-pregnant diabetic patients.  Different  metrics may apply to specific populations.   Standards of Medical Care in Diabetes - 2016.  For the purpose of screening for the presence of diabetes:  <5.7%     Consistent with the absence of diabetes  5.7-6.4%  Consistent with increasing risk for diabetes    (prediabetes)  >or=6.5%  Consistent with diabetes  Currently no consensus exists for use of hemoglobin A1C  for diagnosis of diabetes for children.      Comment for A1C at  3:31 AM on 5/8/2017:  According to ADA guidelines, hemoglobin A1C <7.0% represents  optimal control in non-pregnant diabetic patients.  Different  metrics may apply to specific populations.   Standards of Medical Care in Diabetes - 2016.  For the purpose of screening for the presence of diabetes:  <5.7%     Consistent with the absence of diabetes  5.7-6.4%  Consistent with increasing risk for diabetes   (prediabetes)  >or=6.5%  Consistent with diabetes  Currently no consensus exists for use of hemoglobin A1C  for diagnosis of diabetes for children.        Allergies as of 5/12/2017        Reactions    Penicillins Swelling    Demerol [Meperidine] Swelling    Dilaudid [Hydromorphone (Bulk)] Other (See Comments)    Redness  ------------pt states no longer allergic bc she takes dilaudid pills at home    Percocet [Oxycodone-acetaminophen] Swelling    Adhesive Tape-silicones Rash      Advance Directives     An advance directive is a document which, in the event you are no longer able to make decisions for yourself, tells your healthcare team what kind of treatment you do or do not want to receive, or who you would like to make those decisions for you.  If you do not currently have an advance directive, [facility] encourages you to create one.  For more information call:  [phone]), [phone] ([phone]),  or log on to [email or web address].        Language Assistance Services     ATTENTION: Language assistance services are available, free of charge. Please call [phone].      ATENCIÓN: Si habla español, tiene a mcleod disposición servicios gratuitos de asistencia lingüística. Llame al [phone].     CHÚ Ý: N?u b?n nói Ti?ng Vi?t, có các d?ch v? h? tr? ngôn ng? mi?n phí dành cho b?n. G?i s? [phone].        Diabetes  Discharge Instructions                                    [facility] complies with applicable Federal civil rights laws and does not discriminate on the basis of race, color, national origin, age, disability, or sex.

## 2017-05-12 NOTE — INTERVAL H&P NOTE
The patient has been examined and the H&P has been reviewed:    I concur with the findings and changes have been noted since the H&P was written: Patient has been hospitalized and has had a right chest tube placed for trapped lung without resolution of symptoms.  A thoracentesis on the left also resulted in a trapped lung but with relief of symptoms.  Patient is transitioning to comfort measures but would like left pleural catheter placed prior to hospice enrollment.      Anesthesia/Surgery risks, benefits and alternative options discussed and understood by patient/family.  She has remained off of her antiplatelet therapy (plavix) since 5/8.  Will proceed with tunneled pleural catheter.  I have explained the risks, benefits and alternatives of the procedure in detail.  The patient voices understanding and all questions have been answered.  The patient agrees to proceed as planned.        Active Hospital Problems    Diagnosis  POA    Pneumothorax [J93.9]  Yes      Resolved Hospital Problems    Diagnosis Date Resolved POA   No resolved problems to display.    Malignant pleural effusion, left.

## 2017-05-12 NOTE — SEDATION DOCUMENTATION
Moderate concious sedation was performed and cardiorespiratory functions were monitored the entire procedure by Nona Lopez RN.  Sedation began at 0838  and concluded at 0910.  Verbal report given at bedside in DOSC, to include above documentation.  Nona Lopez RN

## 2017-05-12 NOTE — PLAN OF CARE
Patient states they are ready to be discharged. Instructions given to patient and family. Both verbalize understanding. Patient tolerating po liquids with no difficulty. Patient states pain is at a tolerable level for them. Consent in chart upon patient's discharge from [facility].

## 2017-05-12 NOTE — PROCEDURES
"Lashay Whitley is a 57 y.o. female patient.    Temp: 98.1 °F (36.7 °C) (17)  Pulse: 78 (17)  Resp: (!) 22 (17)  BP: (!) 144/73 (17)  SpO2: 100 % (17)  Weight: 45.4 kg (100 lb) (17)  Height: 4' 11" (149.9 cm) (17)       Chest Tube Insertion  Date/Time: 2017 9:02 AM  Location procedure was performed: [facility] PULMONARY LAB  Performed by: ODELL CARTER  Authorized by: ODELL CARTER   Assisting provider: MARQUISE BERGMAN  Post-operative diagnosis: Malignant pleural effusion  Pre-operative diagnosis: same  Consent Done: Yes  Consent: Verbal consent obtained. Written consent obtained.  Risks and benefits: risks, benefits and alternatives were discussed  Consent given by: patient  Patient understanding: patient states understanding of the procedure being performed  Patient consent: the patient's understanding of the procedure matches consent given  Procedure consent: procedure consent matches procedure scheduled  Relevant documents: relevant documents present and verified  Test results: test results available and properly labeled  Site marked: the operative site was marked (ultrasound confirmed)  Imaging studies: imaging studies available  Patient identity confirmed: , name and verbally with patient  Time out: Immediately prior to procedure a "time out" was called to verify the correct patient, procedure, equipment, support staff and site/side marked as required.  Indications: pleural effusion  Patient sedated: yes  Sedation type: moderate (conscious) sedation  (See MAR for exact dosages of medications).  Sedatives: fentanyl and diazepam  Analgesia: hydromorphone  Vitals: Vital signs were monitored during sedation.  Anesthesia: local infiltration    Anesthesia:  Anesthesia: local infiltration  Local Anesthetic: lidocaine 1% without epinephrine (15 cc)   Technical procedures used: Seldinger technique with tunneled pleural " catheter  Complications: No  Estimated blood loss (mL): < 5 cc.  Specimens: No  Implants: No  Comments: Versed, 4 mg  Fentanyl 100 mcg  Dilaudid 1 mg      Anesthesia performed:  Conscious sedation with 4 mg of versed 1 mg of hydromorphone and 100 mcg of Fentanyl.  Topical anesthetic  With 15cc of 1% Lidocaine without epinephrine, 0 cc pf 1% Lidocaine with epinephrine.  Pre-operative anesthesia assessment:  Mallampatti: 1, ASA 3.    No family or personal history of anesthesia complications.    Estimated blood loss:  < 5cc.    Procedure in Detail:  Patient was oriented to procedure, all questions were answered to patient's satisfaction informed consent was obtained and placed on the chart.  Patient was placed in the right lateral decubitus position.  Using the ultrasound, the largest area of fluid collection was localized.  Using sterile technique and full precautions, patient was cleaned and draped under sterile conditions.  15 cc of 1% lidocaine was used to anesthetize the tunnel tract and pleural fluid was aspirated.  A thoracentesis was performed and catheter has been placed with aspiration of serosanguinous fluid aspirated.  Using the seldinger technique, a guidewire was then inserted into the pleural space through the catheter.  1 cm incisions were then made at each end of the tunneled catheter.  Subcutaneous tunnel was then made and fenestrated 15.5 Kinyarwanda chest tube then tunneled.  Serial dilation over the guide wire of the thoracentesis tract to dilate the skin was then performed and tube placed into pleural space without difficulty.  Incisions were then closed using silk around chest tube exit sight and absorbable at the entrance of the chest tube in the most posterior aspect.  750 cc of serosanguinous fluid was then drained without difficulty.  Patient tolerated procedure well.  Instruction packet and education was provided to patient and family prior to procedure.  A post procedure CXR will be obtained to  confirm placement.    Natasha Metcalf  5/12/2017

## 2017-05-12 NOTE — DISCHARGE SUMMARY
[facility]  Discharge Summary      Admit Date: 5/12/2017    Discharge Date and Time:  05/12/2017 9:17 AM    Attending Physician: Natasha Metcalf MD     Reason for Admission: Tunneled pleural catheter placement    Procedures Performed: Procedure(s) (LRB):  INSERTION-CATHETER (N/A)    Hospital Course (synopsis of major diagnoses, care, treatment, and services provided during the course of the hospital stay): Tunneled catheter complete     Consults: none    Significant Diagnostic Studies: see note     Final Diagnoses:    Principal Problem: Pleural effusion   Secondary Diagnoses:   Active Hospital Problems    Diagnosis  POA    *Pleural effusion [J90]  Yes    Pneumothorax [J93.9]  Yes    Pancreatic adenocarcinoma [C25.9]  Yes     Chronic      Resolved Hospital Problems    Diagnosis Date Resolved POA   No resolved problems to display.       Discharged Condition: stable    Disposition: Home or Self Care    Follow Up/Patient Instructions:     Medications:  Reconciled Home Medications:   Current Discharge Medication List      CONTINUE these medications which have NOT CHANGED    Details   ascorbic acid, vitamin C, (VITAMIN C) 500 MG tablet Take 500 mg by mouth 2 (two) times daily.      aspirin (ECOTRIN) 81 MG EC tablet Take 81 mg by mouth once daily.      gabapentin (NEURONTIN) 300 MG capsule Take 1 capsule (300 mg total) by mouth 3 (three) times daily.  Qty: 90 capsule, Refills: 2    Associated Diagnoses: Neuropathic pain      hydrocodone-acetaminophen 10-325mg (NORCO)  mg Tab Take 1 tablet by mouth every 4 (four) hours as needed.  Qty: 60 tablet, Refills: 0    Associated Diagnoses: Shortness of breath; Pneumothorax, unspecified type; Pleural effusion; Pneumothorax; Pancreatic adenocarcinoma; Liver metastases; Thoracic compression fracture, closed, initial encounter; Coronary artery disease involving native coronary artery of native heart without angina pectoris; Acquired hypothyroidism;  Gastroesophageal reflux disease with esophagitis; Vitamin D deficiency; Type 2 diabetes mellitus without complication, without long-term current use of insulin; Bone metastases; History of pancreatitis; Adrenal nodule; Lung nodule; Diverticulosis of large intestine without hemorrhage; Former smoker; Nodule of spleen; History of coronary artery stent placement; Centrilobular emphysema; Anterior chest wall pain; 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  Qty: 60 tablet, Refills: 0    Associated Diagnoses: Shortness of breath; Pneumothorax, unspecified type; Pleural effusion; Pneumothorax; Pancreatic adenocarcinoma; Liver metastases; Thoracic compression fracture, closed, initial encounter; Coronary artery disease involving native coronary artery of native heart without angina pectoris; Acquired hypothyroidism; Gastroesophageal reflux disease with esophagitis; Vitamin D deficiency; Type 2 diabetes mellitus without complication, without long-term current use of insulin; Bone metastases; History of pancreatitis; Adrenal nodule; Lung nodule; Diverticulosis of large intestine without hemorrhage; Former smoker; Nodule of spleen; History of coronary artery stent placement; Centrilobular emphysema; Anterior chest wall pain; 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 Rescue  Qty: 30 vial, Refills: 1    Associated Diagnoses: Pancreatic adenocarcinoma      levothyroxine (SYNTHROID) 100 MCG tablet Take 1 tablet (100 mcg total) by mouth once daily.  Qty: 30 tablet, Refills: 11    Associated Diagnoses: Acquired hypothyroidism      loratadine (CLARITIN) 10 mg tablet Take 10 mg by mouth once  daily.      methadone (DOLOPHINE) 5 MG tablet Take 1 tablet (5 mg total) by mouth every 8 (eight) hours.  Qty: 90 tablet, Refills: 0    Associated Diagnoses: Pancreatic adenocarcinoma; Liver metastases; Thoracic compression fracture, closed, initial encounter      metoclopramide HCl (REGLAN) 10 MG tablet Take 1 tablet (10 mg total) by mouth 4 (four) times daily before meals and nightly.  Qty: 90 tablet, Refills: 1    Associated Diagnoses: History of pancreatic cancer      multivitamin capsule Take 1 capsule by mouth once daily.      nitroGLYCERIN (NITROSTAT) 0.4 MG SL tablet Place 1 tablet (0.4 mg total) under the tongue every 5 (five) minutes as needed for Chest pain.  Qty: 30 tablet, Refills: 0      ondansetron (ZOFRAN-ODT) 8 MG TbDL Take 1 tablet (8 mg total) by mouth every 6 (six) hours as needed.  Qty: 100 tablet, Refills: 1    Associated Diagnoses: Nausea and vomiting, intractability of vomiting not specified, unspecified vomiting type      pantoprazole (PROTONIX) 40 MG tablet Take 1 tablet (40 mg total) by mouth once daily.  Qty: 30 tablet, Refills: 2      vitamin D 1000 units Tab Take 185 mg by mouth once daily.      albuterol 90 mcg/actuation inhaler Inhale 1-2 puffs into the lungs every 6 (six) hours as needed for Wheezing or Shortness of Breath. Rescue  Qty: 1 Inhaler, Refills: 1    Associated Diagnoses: Shortness of breath; Pneumothorax, unspecified type; Pleural effusion; Pneumothorax; Pancreatic adenocarcinoma; Liver metastases; Thoracic compression fracture, closed, initial encounter; Coronary artery disease involving native coronary artery of native heart without angina pectoris; Acquired hypothyroidism; Gastroesophageal reflux disease with esophagitis; Vitamin D deficiency; Type 2 diabetes mellitus without complication, without long-term current use of insulin; Bone metastases; History of pancreatitis; Adrenal nodule; Lung nodule; Diverticulosis of large intestine without hemorrhage; Former smoker;  "Nodule of spleen; History of coronary artery stent placement; Centrilobular emphysema; Anterior chest wall pain; 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  Qty: 30 tablet, Refills: 2      BD ULTRA-FINE EDWIN PEN NEEDLES 32 x 5/32 " Ndle Uses 5 times daily, on multiple daily insulin injections  Qty: 150 each, Refills: 12    Associated Diagnoses: Type 2 diabetes mellitus with hyperglycemia      diazePAM (VALIUM) 5 MG tablet Take 1 tablet (5 mg total) by mouth every 12 (twelve) hours as needed (muscle spasms).  Qty: 30 tablet, Refills: 1    Associated Diagnoses: Insomnia, unspecified type      insulin aspart (NOVOLOG FLEXPEN) 100 unit/mL InPn pen Inject 6 Units into the skin 3 (three) times daily with meals. With correction scale for 33 units max TDD  Qty: 1 Box, Refills: 6    Associated Diagnoses: Type 2 diabetes mellitus without complication, with long-term current use of insulin      insulin glargine, TOUJEO, (TOUJEO SOLOSTAR) 300 unit/mL (1.5 mL) InPn pen Inject 4 Units into the skin 2 (two) times daily.  Qty: 1 Syringe, Refills: 12      lipase-protease-amylase 36,000-114,000- 180,000 unit CpDR Take 3 capsules by mouth 3 (three) times daily with meals.  Qty: 270 capsule, Refills: 5      metoprolol tartrate (LOPRESSOR) 25 MG tablet Take 0.5 tablets (12.5 mg total) by mouth 2 (two) times daily.  Qty: 30 tablet, Refills: 2      predniSONE (DELTASONE) 20 MG tablet Take 2 tablets (40 mg total) by mouth once daily.  Qty: 60 tablet, Refills: 0    Associated Diagnoses: Shortness of breath             Discharge Procedure Orders  Diet general       Follow-up Information     Call in 1 week to follow up.        "

## 2017-05-12 NOTE — DISCHARGE INSTRUCTIONS
Procedural Sedation (Adult)  You have been given medicine by vein to make you sleep during your surgery. This may have included both a pain medicine and sleeping medicine. Most of the effects have worn off. But you may still have some drowsiness for the next 6 to 8 hours.  Home care  Follow these guidelines when you get home:  · For the next 8 hours, you should be watched by a responsible adult. This person should make sure your condition is not getting worse.  · Don't take any medicine by mouth for pain or for sleep during the next 4 hours. These might react with the medicines you were given in the hospital. This could cause a much stronger response than usual.  · Don't drink any alcohol for the next 24 hours.  · Don't drive, operate dangerous machinery, or make important business or personal decisions during the next 24 hours.  Follow-up care  Follow up with your healthcare provider if you are not alert and back to your usual level of activity within 12 hours.  When to seek medical advice  Call your healthcare provider right away if any of these occur:  · Drowsiness gets worse  · Weakness or dizziness gets worse  · Repeated vomiting  · You cannot be awakened   Date Last Reviewed: 10/18/2016  © 1812-3841 [employer]. [address] [place] [address], [place] [address]. All rights reserved. This information is not intended as a substitute for professional medical care. Always follow your healthcare professional's instructions.

## 2017-05-12 NOTE — LETTER
May 15, 2017      Wendi Nguyễn MD  [address]  [facility] [address]           [facility]  [address]  [place] [address]  Phone: [phone]  Fax: [phone]          Patient: Lashay Whitley   MR Number: [id number]   YOB: 1960   Date of Visit: 5/12/2017       Dear Dr. Wendi Nguyễn:    Thank you for referring Lashay Whitley to me for evaluation. Attached you will find relevant portions of my assessment and plan of care.    If you have questions, please do not hesitate to call me. I look forward to following Lashay Whitley along with you.    Sincerely,        Enclosure  CC:  No Recipients    If you would like to receive this communication electronically, please contact [email or web address] or [phone] to request more information on [employer] Link access.    For providers and/or their staff who would like to refer a patient to [facility], please contact us through our one-stop-shop provider referral line, [facility], at [phone].    If you feel you have received this communication in error or would no longer like to receive these types of communications, please e-mail [email or web address]

## 2017-05-12 NOTE — H&P (VIEW-ONLY)
"Subjective:       Patient ID: Lashay Whitley is a 57 y.o. female.    Chief Complaint: Pleural Effusion    HPI Comments: 57 year old with a history of metastatic pancreatic cancer.  Patient recently traveled to [place] in a car and suddenly developed worsening GOLD and hypoxia.  Has been on supplemental oxygen without much relief of symptoms.    Review of Systems   Constitutional: Positive for weight loss (5 pounds).   Respiratory: Positive for cough, wheezing and orthopnea. Negative for sputum production.    Cardiovascular: Positive for leg swelling.       Objective:       Vitals:    05/03/17 0816   BP: 104/60   Pulse: 66   SpO2: 96%   Weight: 46.5 kg (102 lb 8.2 oz)   Height: 4' 11" (1.499 m)   96% on 3L/nc    83% on room air at rest    Physical Exam   Constitutional: She is oriented to person, place, and time. She appears well-developed and well-nourished.   Cardiovascular: Normal rate and regular rhythm.    Pulmonary/Chest: She has decreased breath sounds (at bases bilaterally).   Musculoskeletal: She exhibits edema (1 plus edema, no calf tenderness).   Lymphadenopathy: No supraclavicular adenopathy is present.     She has no cervical adenopathy.   Neurological: She is alert and oriented to person, place, and time.   Psychiatric: She has a normal mood and affect.     Personal Diagnostic Review  Chest x-ray: INcreasing bilateral pleural effusions with increased interstitial markings  No flowsheet data found.      Assessment:       1. Acute respiratory failure with hypoxia    2. Pleural effusion, right    3. Pancreatic cancer metastasized to lung        Outpatient Encounter Prescriptions as of 5/3/2017   Medication Sig Dispense Refill    ascorbic acid, vitamin C, (VITAMIN C) 500 MG tablet Take 500 mg by mouth 2 (two) times daily.      aspirin (ECOTRIN) 81 MG EC tablet Take 81 mg by mouth once daily.      atorvastatin (LIPITOR) 40 MG tablet Take 1 tablet (40 mg total) by mouth once daily. 30 tablet 2    BACILLUS " "COAGULANS (PROBIOTIC, B. COAGULANS, ORAL) Take by mouth.      BD ULTRA-FINE EDWIN PEN NEEDLES 32 x 5/32 " Ndle Uses 5 times daily, on multiple daily insulin injections 150 each 12    clopidogrel (PLAVIX) 75 mg tablet Take 1 tablet (75 mg total) by mouth once daily. 30 tablet 11    furosemide (LASIX) 40 MG tablet TAKE 1 TABLET (40 MG TOTAL) BY MOUTH 2 (TWO) TIMES DAILY. 60 tablet 1    gabapentin (NEURONTIN) 300 MG capsule Take 1 capsule (300 mg total) by mouth 3 (three) times daily. 90 capsule 2    hydrocodone-acetaminophen 5-325mg (NORCO) 5-325 mg per tablet Take 1 tablet by mouth every 6 (six) hours as needed for Pain. 120 tablet 0    insulin aspart (NOVOLOG FLEXPEN) 100 unit/mL InPn pen Inject 6 Units into the skin 3 (three) times daily with meals. With correction scale for 33 units max TDD (Patient taking differently: Inject 5 Units into the skin 3 (three) times daily with meals. With correction scale for 33 units max TDD) 1 Box 6    insulin glargine, TOUJEO, (TOUJEO SOLOSTAR) 300 unit/mL (1.5 mL) InPn pen Inject 4 Units into the skin 2 (two) times daily. 1 Syringe 12    KRILL OIL ORAL Take by mouth once daily.      levothyroxine (SYNTHROID) 100 MCG tablet Take 1 tablet (100 mcg total) by mouth once daily. 30 tablet 11    loratadine (CLARITIN) 10 mg tablet Take 10 mg by mouth once daily.      methadone (DOLOPHINE) 5 MG tablet Take 1 tablet (5 mg total) by mouth every 12 (twelve) hours. 60 tablet 0    metoclopramide HCl (REGLAN) 10 MG tablet Take 1 tablet (10 mg total) by mouth 4 (four) times daily before meals and nightly. 90 tablet 1    metoprolol tartrate (LOPRESSOR) 25 MG tablet Take 0.5 tablets (12.5 mg total) by mouth 2 (two) times daily. 30 tablet 2    morphine (MS CONTIN) 15 MG 12 hr tablet Take 2 tablets (30 mg total) by mouth every 8 (eight) hours. Dosing increase, needs refill early 90 tablet 0    multivitamin capsule Take 1 capsule by mouth once daily.      ondansetron (ZOFRAN-ODT) 8 MG " "TbDL Take 1 tablet (8 mg total) by mouth every 6 (six) hours as needed. 100 tablet 1    vitamin D 1000 units Tab Take 185 mg by mouth once daily.      diazePAM (VALIUM) 5 MG tablet Take 1 tablet (5 mg total) by mouth every 12 (twelve) hours as needed (muscle spasms). 30 tablet 1    erlotinib (TARCEVA) 100 MG tablet Take 1 tablet (100 mg total) by mouth once daily. 30 tablet 1    ipratropium (ATROVENT) 0.02 % nebulizer solution Take 2.5 mLs (500 mcg total) by nebulization 4 (four) times daily. Rescue 30 vial 1    lipase-protease-amylase 36,000-114,000- 180,000 unit CpDR Take 3 capsules by mouth 3 (three) times daily with meals. 270 capsule 5    nitroGLYCERIN (NITROSTAT) 0.4 MG SL tablet Place 1 tablet (0.4 mg total) under the tongue every 5 (five) minutes as needed for Chest pain. 30 tablet 0    pantoprazole (PROTONIX) 40 MG tablet Take 1 tablet (40 mg total) by mouth once daily. 30 tablet 2    [DISCONTINUED] senna-docusate 8.6-50 mg (PERICOLACE) 8.6-50 mg per tablet Take 1 tablet by mouth daily as needed for Constipation.       No facility-administered encounter medications on file as of 5/3/2017.      Orders Placed This Encounter   Procedures    OXYGEN FOR HOME USE     Order Specific Question:   Liter Flow     Answer:   3     Order Specific Question:   Duration     Answer:   Continuous     Order Specific Question:   Qualifying SpO2:     Answer:   83     Order Specific Question:   Testing done at:     Answer:   Rest     Order Specific Question:   Route     Answer:   nasal cannula     Order Specific Question:   Portable mode:     Answer:   pulse dose acceptable     Order Specific Question:   Device     Answer:   home concentrator with portable unit     Comments:   needs portable oxygen concentrator     Order Specific Question:   Length of need (in months):     Answer:   99 mos     Order Specific Question:   Height:     Answer:   4' 11" (1.499 m)     Order Specific Question:   Weight:     Answer:   46.5 kg " (102 lb 8.2 oz)     Order Specific Question:   Alternative treatment measures have been tried or considered and deemed clinically ineffective.     Answer:   Yes    CTA Chest Non Coronary     Standing Status:   Future     Standing Expiration Date:   5/3/2018     Order Specific Question:   Is the patient allergic to iodine or contrast? Has a steroid / antihistamine prep been administered?     Answer:   No     Order Specific Question:   Is the patient on ANY Metformin drug such as Glugophage/Glucovance?           Should be off drug 48 hours after contrast. Check renal function before restart.     Answer:   No     Order Specific Question:   Age > 60 years?     Answer:   No     Order Specific Question:   History of Kidney Disease - including: decreased kidney function, dialysis, kidney transplay, single kidney, kidney cancer, kidney surgery?     Answer:   None     Order Specific Question:   Does the patient have high blood pressure requiring medical treatment?     Answer:   No     Order Specific Question:   Diabetes?     Answer:   Yes     Order Specific Question:   May the Radiologist modify the order per protocol to meet the clinical needs of the patient?     Answer:   Yes     Order Specific Question:   Recist criteria?     Answer:   No     Plan:       Patient would benefit from portable oxygen concentrator as she wants to travel and will by flying.  Diagnostic and therapeutic thoracentesis  I have explained the risks, benefits and alternatives of the procedure in detail.  The patient voices understanding and all questions have been answered.  The patient agrees to proceed as planned.    Written consent was signed prior to beginning procedure.  The increased risk of bleeding while on plavix was discussed (had a recent MI).  CTA to rule out PE as patient recently had a long car drive and it seems that this is when symptoms began      [facility]  Thoracentesis  Procedure Note    SUMMARY     Date of  Procedure: 05/03/2017     Procedure: Thoracentesis    Indications: Therapeutic    Pre-Operative Diagnosis: Metastatic pancreatic cancer    Post-Operative Diagnosis: same    Anesthesia: local    Technical Procedures Used: Mariola    Description of the Findings of the Procedure: 1200cc of clear yellow fluid    Consent: Informed consent was obtained. Risks of the procedure were discussed including: infection, bleeding, pain, pneumothorax.    Under sterile conditions the patient was positioned. Chlorhexadine solution and sterile drapes were utilized. 4 cc of 2% with epi lidocaine and1 cc 1% plain lidocaine was used to anesthetize between the rib space after localized under ultrasound. Fluid was obtained after catheter inserted without  difficulty and suction applied with minimal blood loss.  A dressing was applied to the wound and wound care instructions were provided.     1200 ml of clear pleural fluid was obtained. A sample was sent to Pathology for cytogenetics, flow, and cell counts, as well as for infection analysis.    Plan:    A follow up chest x-ray was ordered. No  Tylenol 650 mg. for pain.    Significant Surgical Tasks Conducted by the Assistant(s), if Applicable:    Complications: None; patient tolerated the procedure well.    Estimated Blood Loss (EBL): None    Attestation: I performed the procedure.    Patient will return next Friday for a left sided thoracentesis and will evaluate if rapidly reaccumulating and would benefit from PleurX catheter.

## 2017-05-13 ENCOUNTER — TELEPHONE (OUTPATIENT)
Dept: INTERNAL MEDICINE | Facility: CLINIC | Age: 57
End: 2017-05-13

## 2017-05-13 LAB — BACTERIA FLD CULT: NORMAL

## 2017-05-15 ENCOUNTER — TELEPHONE (OUTPATIENT)
Dept: PAIN MEDICINE | Facility: CLINIC | Age: 57
End: 2017-05-15

## 2017-05-15 RX ORDER — LEVOTHYROXINE SODIUM 100 UG/1
TABLET ORAL
Qty: 30 TABLET | Refills: 10 | Status: SHIPPED | OUTPATIENT
Start: 2017-05-15

## 2017-05-15 NOTE — TELEPHONE ENCOUNTER
Spoke with patient's  per Dr. Romi quach to schedule patient for a Kyphoplasty at T3,T4, and T5 today at 10:30am.   Aneudy stated she just got out of bed and there was no way she would be able to get to the procedure area by 9:45 am and he was asking if  This procedure could he done on Thursday.  He state she is in a lot of pain and takes her a few hours in the morning to get ready and moving.

## 2017-05-15 NOTE — PROGRESS NOTES
Discussed case with Dr. Ochoa  Agree that proceeding with kyphoplasty appropriate- she has had prior XRT to area (completed 12/2016) and pain progressed recently

## 2017-05-17 ENCOUNTER — SURGERY (OUTPATIENT)
Age: 57
End: 2017-05-17

## 2017-05-17 ENCOUNTER — HOSPITAL ENCOUNTER (OUTPATIENT)
Facility: OTHER | Age: 57
Discharge: HOME | End: 2017-05-17
Attending: ANESTHESIOLOGY | Admitting: ANESTHESIOLOGY
Payer: COMMERCIAL

## 2017-05-17 VITALS
SYSTOLIC BLOOD PRESSURE: 123 MMHG | TEMPERATURE: 98 F | WEIGHT: 101 LBS | OXYGEN SATURATION: 96 % | DIASTOLIC BLOOD PRESSURE: 74 MMHG | HEART RATE: 59 BPM | RESPIRATION RATE: 18 BRPM | BODY MASS INDEX: 20.36 KG/M2 | HEIGHT: 59 IN

## 2017-05-17 DIAGNOSIS — M51.24 HERNIATED THORACIC DISC WITHOUT MYELOPATHY: Primary | ICD-10-CM

## 2017-05-17 LAB — POCT GLUCOSE: 206 MG/DL (ref 70–110)

## 2017-05-17 PROCEDURE — 77003 FLUOROGUIDE FOR SPINE INJECT: CPT | Performed by: ANESTHESIOLOGY

## 2017-05-17 PROCEDURE — 25000003 PHARM REV CODE 250: Performed by: ANESTHESIOLOGY

## 2017-05-17 PROCEDURE — 82947 ASSAY GLUCOSE BLOOD QUANT: CPT | Performed by: ANESTHESIOLOGY

## 2017-05-17 PROCEDURE — 62321 NJX INTERLAMINAR CRV/THRC: CPT | Mod: ,,, | Performed by: ANESTHESIOLOGY

## 2017-05-17 PROCEDURE — 63600175 PHARM REV CODE 636 W HCPCS: Performed by: ANESTHESIOLOGY

## 2017-05-17 PROCEDURE — 62320 NJX INTERLAMINAR CRV/THRC: CPT | Performed by: ANESTHESIOLOGY

## 2017-05-17 PROCEDURE — 62321 NJX INTERLAMINAR CRV/THRC: CPT | Performed by: ANESTHESIOLOGY

## 2017-05-17 PROCEDURE — 99152 MOD SED SAME PHYS/QHP 5/>YRS: CPT | Mod: ,,, | Performed by: ANESTHESIOLOGY

## 2017-05-17 PROCEDURE — 25500020 PHARM REV CODE 255: Performed by: ANESTHESIOLOGY

## 2017-05-17 RX ORDER — FENTANYL CITRATE 50 UG/ML
INJECTION, SOLUTION INTRAMUSCULAR; INTRAVENOUS
Status: DISCONTINUED | OUTPATIENT
Start: 2017-05-17 | End: 2017-05-17 | Stop reason: HOSPADM

## 2017-05-17 RX ORDER — BUPIVACAINE HYDROCHLORIDE 2.5 MG/ML
INJECTION, SOLUTION EPIDURAL; INFILTRATION; INTRACAUDAL
Status: DISCONTINUED | OUTPATIENT
Start: 2017-05-17 | End: 2017-05-17 | Stop reason: HOSPADM

## 2017-05-17 RX ORDER — METHYLPREDNISOLONE ACETATE 40 MG/ML
INJECTION, SUSPENSION INTRA-ARTICULAR; INTRALESIONAL; INTRAMUSCULAR; SOFT TISSUE
Status: DISCONTINUED | OUTPATIENT
Start: 2017-05-17 | End: 2017-05-17 | Stop reason: HOSPADM

## 2017-05-17 RX ORDER — SODIUM CHLORIDE 9 MG/ML
INJECTION, SOLUTION INTRAVENOUS CONTINUOUS
Status: DISCONTINUED | OUTPATIENT
Start: 2017-05-17 | End: 2017-05-17 | Stop reason: HOSPADM

## 2017-05-17 RX ORDER — MIDAZOLAM HYDROCHLORIDE 1 MG/ML
INJECTION INTRAMUSCULAR; INTRAVENOUS
Status: DISCONTINUED | OUTPATIENT
Start: 2017-05-17 | End: 2017-05-17 | Stop reason: HOSPADM

## 2017-05-17 RX ADMIN — MIDAZOLAM HYDROCHLORIDE 2 MG: 1 INJECTION, SOLUTION INTRAMUSCULAR; INTRAVENOUS at 03:05

## 2017-05-17 RX ADMIN — FENTANYL CITRATE 50 MCG: 50 INJECTION, SOLUTION INTRAMUSCULAR; INTRAVENOUS at 03:05

## 2017-05-17 RX ADMIN — METHYLPREDNISOLONE ACETATE 40 MG: 40 INJECTION, SUSPENSION INTRA-ARTICULAR; INTRALESIONAL; INTRAMUSCULAR; SOFT TISSUE at 03:05

## 2017-05-17 RX ADMIN — FENTANYL CITRATE 25 MCG: 50 INJECTION, SOLUTION INTRAMUSCULAR; INTRAVENOUS at 03:05

## 2017-05-17 RX ADMIN — FENTANYL CITRATE 25 MCG: 50 INJECTION, SOLUTION INTRAMUSCULAR; INTRAVENOUS at 04:05

## 2017-05-17 RX ADMIN — BUPIVACAINE HYDROCHLORIDE 10 ML: 2.5 INJECTION, SOLUTION EPIDURAL; INFILTRATION; INTRACAUDAL; PERINEURAL at 03:05

## 2017-05-17 RX ADMIN — IOHEXOL 50 ML: 300 INJECTION, SOLUTION INTRAVENOUS at 03:05

## 2017-05-17 RX ADMIN — SODIUM CHLORIDE: 0.9 INJECTION, SOLUTION INTRAVENOUS at 03:05

## 2017-05-17 NOTE — IP AVS SNAPSHOT
[facility] Location ([facility])  [address] [place], [place] [address]  Phone: [phone]           Patient Discharge Instructions   Our goal is to set you up for success. This packet includes information on your condition, medications, and your home care.  It will help you care for yourself to prevent having to return to the hospital.     Please ask your nurse if you have any questions.      There are many details to remember when preparing to leave the hospital. Here is what you will need to do:    1. Take your medicine. If you are prescribed medications, review your Medication List on the following pages. You may have new medications to  at the pharmacy and others that you'll need to stop taking. Review the instructions for how and when to take your medications. Talk with your doctor or nurses if you are unsure of what to do.     2. Go to your follow-up appointments. Specific follow-up information is listed in the following pages. Your may be contacted by a nurse or clinical provider about future appointments. Be sure we have all of the phone numbers to reach you. Please contact your provider's office if you are unable to make an appointment.     3. Watch for warning signs. Your doctor or nurse will give you detailed warning signs to watch for and when to call for assistance. These instructions may also include educational information about your condition. If you experience any of warning signs to your health, call your doctor.           [facility]  Unless otherwise directed by your provider, please   contact [facility], our nurse care line   that is available for 24/7 assistance.     [phone] (toll-free)     Registered nurses in the [facility]   provide: appointment scheduling, clinical advisement, health education, and other advisory services.                  ** Verify the list of medication(s) below is accurate and up to date. Carry this with you in case of  "emergency. If your medications have changed, please notify your healthcare provider.             Medication List      ASK your doctor about these medications        Additional Info                      albuterol 90 mcg/actuation inhaler   Quantity:  1 Inhaler   Refills:  1   Dose:  1-2 puff    Instructions:  Inhale 1-2 puffs into the lungs every 6 (six) hours as needed for Wheezing or Shortness of Breath. Rescue     Begin Date    AM    Noon    PM    Bedtime       aspirin 81 MG EC tablet   Commonly known as:  ECOTRIN   Refills:  0   Dose:  81 mg    Instructions:  Take 81 mg by mouth once daily.     Begin Date    AM    Noon    PM    Bedtime       atorvastatin 40 MG tablet   Commonly known as:  LIPITOR   Quantity:  30 tablet   Refills:  2   Dose:  40 mg    Instructions:  Take 1 tablet (40 mg total) by mouth once daily.     Begin Date    AM    Noon    PM    Bedtime       BD ULTRA-FINE EDWIN PEN NEEDLES 32 gauge x 5/32" Ndle   Quantity:  150 each   Refills:  12   Generic drug:  pen needle, diabetic    Instructions:  Uses 5 times daily, on multiple daily insulin injections     Begin Date    AM    Noon    PM    Bedtime       diazePAM 5 MG tablet   Commonly known as:  VALIUM   Quantity:  30 tablet   Refills:  1   Dose:  5 mg    Instructions:  Take 1 tablet (5 mg total) by mouth every 12 (twelve) hours as needed (muscle spasms).     Begin Date    AM    Noon    PM    Bedtime       gabapentin 300 MG capsule   Commonly known as:  NEURONTIN   Quantity:  90 capsule   Refills:  2   Dose:  300 mg    Instructions:  Take 1 capsule (300 mg total) by mouth 3 (three) times daily.     Begin Date    AM    Noon    PM    Bedtime       hydrocodone-acetaminophen 10-325mg  mg Tab   Commonly known as:  NORCO   Quantity:  60 tablet   Refills:  0   Dose:  1 tablet    Instructions:  Take 1 tablet by mouth every 4 (four) hours as needed.     Begin Date    AM    Noon    PM    Bedtime       HYDROmorphone 4 MG tablet   Commonly known as:  " DILAUDID   Quantity:  60 tablet   Refills:  0   Dose:  4 mg    Instructions:  Take 1 tablet (4 mg total) by mouth every 3 (three) hours as needed.     Begin Date    AM    Noon    PM    Bedtime       insulin aspart 100 unit/mL Inpn pen   Commonly known as:  NOVOLOG FLEXPEN   Quantity:  1 Box   Refills:  6   Dose:  6 Units    Instructions:  Inject 6 Units into the skin 3 (three) times daily with meals. With correction scale for 33 units max TDD     Begin Date    AM    Noon    PM    Bedtime       insulin glargine (TOUJEO) 300 unit/mL (1.5 mL) Inpn pen   Commonly known as:  TOUJEO SOLOSTAR   Quantity:  1 Syringe   Refills:  12   Dose:  4 Units    Instructions:  Inject 4 Units into the skin 2 (two) times daily.     Begin Date    AM    Noon    PM    Bedtime       ipratropium 0.02 % nebulizer solution   Commonly known as:  ATROVENT   Quantity:  30 vial   Refills:  1   Dose:  500 mcg    Instructions:  Take 2.5 mLs (500 mcg total) by nebulization 4 (four) times daily. Rescue     Begin Date    AM    Noon    PM    Bedtime       levothyroxine 100 MCG tablet   Commonly known as:  SYNTHROID   Quantity:  30 tablet   Refills:  10    Instructions:  TAKE 1 TABLET (100 MCG TOTAL) BY MOUTH ONCE DAILY.     Begin Date    AM    Noon    PM    Bedtime       lidocaine 5 %   Commonly known as:  LIDODERM   Quantity:  30 patch   Refills:  2   Dose:  1 patch    Instructions:  Place 1 patch onto the skin once daily. Remove & Discard patch within 12 hours or as directed by MD     Begin Date    AM    Noon    PM    Bedtime       lipase-protease-amylase 36,000-114,000- 180,000 unit Cpdr   Quantity:  270 capsule   Refills:  5   Dose:  3 capsule   Indications:  Pancreatic Insufficiency    Instructions:  Take 3 capsules by mouth 3 (three) times daily with meals.     Begin Date    AM    Noon    PM    Bedtime       loratadine 10 mg tablet   Commonly known as:  CLARITIN   Refills:  0   Dose:  10 mg    Instructions:  Take 10 mg by mouth once daily.      Begin Date    AM    Noon    PM    Bedtime       methadone 5 MG tablet   Commonly known as:  DOLOPHINE   Quantity:  90 tablet   Refills:  0   Dose:  5 mg    Instructions:  Take 1 tablet (5 mg total) by mouth every 8 (eight) hours.     Begin Date    AM    Noon    PM    Bedtime       metoclopramide HCl 10 MG tablet   Commonly known as:  REGLAN   Quantity:  90 tablet   Refills:  1   Dose:  10 mg    Instructions:  Take 1 tablet (10 mg total) by mouth 4 (four) times daily before meals and nightly.     Begin Date    AM    Noon    PM    Bedtime       metoprolol tartrate 25 MG tablet   Commonly known as:  LOPRESSOR   Quantity:  30 tablet   Refills:  2   Dose:  12.5 mg    Instructions:  Take 0.5 tablets (12.5 mg total) by mouth 2 (two) times daily.     Begin Date    AM    Noon    PM    Bedtime       multivitamin capsule   Refills:  0   Dose:  1 capsule    Instructions:  Take 1 capsule by mouth once daily.     Begin Date    AM    Noon    PM    Bedtime       nitroGLYCERIN 0.4 MG SL tablet   Commonly known as:  NITROSTAT   Quantity:  30 tablet   Refills:  0   Dose:  0.4 mg    Instructions:  Place 1 tablet (0.4 mg total) under the tongue every 5 (five) minutes as needed for Chest pain.     Begin Date    AM    Noon    PM    Bedtime       ondansetron 8 MG Tbdl   Commonly known as:  ZOFRAN-ODT   Quantity:  100 tablet   Refills:  1   Dose:  8 mg    Instructions:  Take 1 tablet (8 mg total) by mouth every 6 (six) hours as needed.     Begin Date    AM    Noon    PM    Bedtime       pantoprazole 40 MG tablet   Commonly known as:  PROTONIX   Quantity:  30 tablet   Refills:  2   Dose:  40 mg    Instructions:  Take 1 tablet (40 mg total) by mouth once daily.     Begin Date    AM    Noon    PM    Bedtime       predniSONE 20 MG tablet   Commonly known as:  DELTASONE   Quantity:  60 tablet   Refills:  0   Dose:  40 mg    Instructions:  Take 2 tablets (40 mg total) by mouth once daily.     Begin Date    AM    Noon    PM    Bedtime        VITAMIN C 500 MG tablet   Refills:  0   Dose:  500 mg   Generic drug:  ascorbic acid (vitamin C)    Instructions:  Take 500 mg by mouth 2 (two) times daily.     Begin Date    AM    Noon    PM    Bedtime       vitamin D 1000 units Tab   Refills:  0   Dose:  185 mg    Instructions:  Take 185 mg by mouth once daily.     Begin Date    AM    Noon    PM    Bedtime                  Please bring to all follow up appointments:    1. A copy of your discharge instructions.  2. All medicines you are currently taking in their original bottles.  3. Identification and insurance card.    Please arrive 15 minutes ahead of scheduled appointment time.    Please call 24 hours in advance if you must reschedule your appointment and/or time.        Your Scheduled Appointments     May 23, 2017 10:40 AM CDT   Established Patient Visit with Tanner Leos MD   [facility] - Cardiology ([facility] )    [address]  [place] [address]   [phone]            May 31, 2017 11:30 AM CDT   Established Patient Visit with Shahla Ochoa MD   [facility] ([facility])    [address]  [place] [address]   [phone]            Jun 19, 2017  9:00 AM CDT   Established Patient Visit with SUZY Trevizo   [facility] ([facility])    [address] [facility] [address]   [phone]              Your Future Surgeries/Procedures     May 25, 2017   Surgery with Shahla Ochoa MD   [facility] ([facility])    [address] [facility] [address]   [phone]                  Discharge Instructions       Home Care Instructions Pain Management:    1. DIET:   You may resume your normal diet today.   2. BATHING:   You may shower with luke warm water. No soaking in tub.  3. DRESSING:   You may remove your bandage today.   4. ACTIVITY LEVEL:   You may resume your normal activities 24 hrs after your procedure.  5.  "MEDICATIONS:   You may resume your normal medications today.   6. SPECIAL INSTRUCTIONS:   No heat to the injection site for 24 hrs including, bath or shower, heating pad, moist heat, or hot tubs.    Use ice pack to injection site for any pain or discomfort.  Apply ice packs for 20 minute intervals as needed.   If you have received any sedatives by mouth today you may not drive for 12 hours.    If you have received any sedation through your IV, you may not drive for 24 hrs.     PLEASE CALL YOUR DOCTOR IF:  1. Redness or swelling around the injection site.  2. Fever of 101 degrees  3. Drainage (pus) from the injection site.  4. For any continuous bleeding (some dried blood over the incision is normal.)  5. For severe headache that is relieved when lying flat.    FOR EMERGENCIES:   If any unusual problems or difficulties occur during clinic hours, call [phone] or 326.         Admission Information     Date & Time Provider Department CSN    5/17/2017  2:32 PM Kavita Crump MD [facility] [id number]      Care Providers     Provider Role Specialty Primary office phone    Kavita Crump MD Attending Provider Pain Medicine [phone]    Kavita Crump MD Surgeon  Pain Medicine [phone]      Your Vitals Were     BP Pulse Temp Resp Height Weight    106/68 65 97.8 °F (36.6 °C) (Oral) 18 4' 11" (1.499 m) 45.8 kg (101 lb)    SpO2 BMI             96% 20.4 kg/m2         Recent Lab Values        8/1/2015 10/26/2015 3/7/2016 8/25/2016 10/19/2016 12/6/2016 2/23/2017 5/8/2017      8:15 AM  9:30 PM 10:29 AM  1:53 PM  9:20 AM  8:30 PM 11:00 AM  3:31 AM    A1C 7.4 (H) 7.5 (H) 6.1 5.5 5.5 5.8 5.8 5.7    Comment for A1C at  1:53 PM on 8/25/2016:  According to ADA guidelines, hemoglobin A1C <7.0% represents  optimal control in non-pregnant diabetic patients.  Different  metrics may apply to specific populations.   Standards of Medical Care in Diabetes - 2016.  For the purpose of screening for the " presence of diabetes:  <5.7%     Consistent with the absence of diabetes  5.7-6.4%  Consistent with increasing risk for diabetes   (prediabetes)  >or=6.5%  Consistent with diabetes  Currently no consensus exists for use of hemoglobin A1C  for diagnosis of diabetes for children.      Comment for A1C at  9:20 AM on 10/19/2016:  According to ADA guidelines, hemoglobin A1C <7.0% represents  optimal control in non-pregnant diabetic patients.  Different  metrics may apply to specific populations.   Standards of Medical Care in Diabetes - 2016.  For the purpose of screening for the presence of diabetes:  <5.7%     Consistent with the absence of diabetes  5.7-6.4%  Consistent with increasing risk for diabetes   (prediabetes)  >or=6.5%  Consistent with diabetes  Currently no consensus exists for use of hemoglobin A1C  for diagnosis of diabetes for children.      Comment for A1C at  8:30 PM on 12/6/2016:  According to ADA guidelines, hemoglobin A1C <7.0% represents  optimal control in non-pregnant diabetic patients.  Different  metrics may apply to specific populations.   Standards of Medical Care in Diabetes - 2016.  For the purpose of screening for the presence of diabetes:  <5.7%     Consistent with the absence of diabetes  5.7-6.4%  Consistent with increasing risk for diabetes   (prediabetes)  >or=6.5%  Consistent with diabetes  Currently no consensus exists for use of hemoglobin A1C  for diagnosis of diabetes for children.      Comment for A1C at 11:00 AM on 2/23/2017:  According to ADA guidelines, hemoglobin A1C <7.0% represents  optimal control in non-pregnant diabetic patients.  Different  metrics may apply to specific populations.   Standards of Medical Care in Diabetes - 2016.  For the purpose of screening for the presence of diabetes:  <5.7%     Consistent with the absence of diabetes  5.7-6.4%  Consistent with increasing risk for diabetes   (prediabetes)  >or=6.5%  Consistent with diabetes  Currently no consensus  exists for use of hemoglobin A1C  for diagnosis of diabetes for children.      Comment for A1C at  3:31 AM on 5/8/2017:  According to ADA guidelines, hemoglobin A1C <7.0% represents  optimal control in non-pregnant diabetic patients.  Different  metrics may apply to specific populations.   Standards of Medical Care in Diabetes - 2016.  For the purpose of screening for the presence of diabetes:  <5.7%     Consistent with the absence of diabetes  5.7-6.4%  Consistent with increasing risk for diabetes   (prediabetes)  >or=6.5%  Consistent with diabetes  Currently no consensus exists for use of hemoglobin A1C  for diagnosis of diabetes for children.        Allergies as of 5/17/2017        Reactions    Penicillins Swelling    Demerol [Meperidine] Swelling    Dilaudid [Hydromorphone (Bulk)] Other (See Comments)    Redness  ------------pt states no longer allergic bc she takes dilaudid pills at home    Percocet [Oxycodone-acetaminophen] Swelling    Adhesive Tape-silicones Rash      Advance Directives     An advance directive is a document which, in the event you are no longer able to make decisions for yourself, tells your healthcare team what kind of treatment you do or do not want to receive, or who you would like to make those decisions for you.  If you do not currently have an advance directive, [facility] encourages you to create one.  For more information call:  [phone]), [phone] ([phone]),  or log on to [email or web address].        Smoking Cessation     If you would like to quit smoking:   You may be eligible for free services if you are a [place] resident and started smoking cigarettes before September 1, 1988.  Call the [employer]oking Cessation [employer] (SCT) toll free at (277) 790-2770 or (235) 294-4298.   Call 1-800-QUIT-NOW if you do not meet the above criteria.   Contact us via email: [email or web address]   View our website for more information: [email or web address]         Language Assistance Services     ATTENTION: Language assistance services are available, free of charge. Please call [phone].      ATENCIÓN: Si habla tj, tiene a mcleod disposición servicios gratuitos de asistencia lingüística. Llame al [phone].     CHÚ Ý: N?u b?n nói Ti?ng Vi?t, có các d?ch v? h? tr? ngôn ng? mi?n phí dành cho b?n. G?i s? [phone].        Diabetes Discharge Instructions                                    [facility] complies with applicable Federal civil rights laws and does not discriminate on the basis of race, color, national origin, age, disability, or sex.

## 2017-05-17 NOTE — DISCHARGE SUMMARY
"Discharge Note  Short Stay      SUMMARY     Admit Date: 5/17/2017    Attending Physician: Kavita Crump      Discharge Physician: Kavita Crump      Discharge Date: 5/17/2017 4:09 PM     PROCEDURE: Interlaminar epidural steroid injection under fluoroscopic guidance.     Pre-Op diagnosis: thoracic compression fracture and cancer associated pain    Disposition: Home or self care    Patient Instructions:   Current Discharge Medication List      CONTINUE these medications which have NOT CHANGED    Details   albuterol 90 mcg/actuation inhaler Inhale 1-2 puffs into the lungs every 6 (six) hours as needed for Wheezing or Shortness of Breath. Rescue  Qty: 1 Inhaler, Refills: 1    Associated Diagnoses: Shortness of breath; Pneumothorax, unspecified type; Pleural effusion; Pneumothorax; Pancreatic adenocarcinoma; Liver metastases; Thoracic compression fracture, closed, initial encounter; Coronary artery disease involving native coronary artery of native heart without angina pectoris; Acquired hypothyroidism; Gastroesophageal reflux disease with esophagitis; Vitamin D deficiency; Type 2 diabetes mellitus without complication, without long-term current use of insulin; Bone metastases; History of pancreatitis; Adrenal nodule; Lung nodule; Diverticulosis of large intestine without hemorrhage; Former smoker; Nodule of spleen; History of coronary artery stent placement; Centrilobular emphysema; Anterior chest wall pain; 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      aspirin (ECOTRIN) 81 MG EC tablet Take 81 mg by mouth once daily.      BD ULTRA-FINE EDWIN PEN NEEDLES 32 x 5/32 " Ndle Uses 5 times daily, on multiple daily insulin injections  Qty: 150 each, Refills: 12    Associated Diagnoses: Type 2 diabetes mellitus with hyperglycemia      gabapentin (NEURONTIN) 300 MG " capsule Take 1 capsule (300 mg total) by mouth 3 (three) times daily.  Qty: 90 capsule, Refills: 2    Associated Diagnoses: Neuropathic pain      hydrocodone-acetaminophen 10-325mg (NORCO)  mg Tab Take 1 tablet by mouth every 4 (four) hours as needed.  Qty: 60 tablet, Refills: 0    Associated Diagnoses: Shortness of breath; Pneumothorax, unspecified type; Pleural effusion; Pneumothorax; Pancreatic adenocarcinoma; Liver metastases; Thoracic compression fracture, closed, initial encounter; Coronary artery disease involving native coronary artery of native heart without angina pectoris; Acquired hypothyroidism; Gastroesophageal reflux disease with esophagitis; Vitamin D deficiency; Type 2 diabetes mellitus without complication, without long-term current use of insulin; Bone metastases; History of pancreatitis; Adrenal nodule; Lung nodule; Diverticulosis of large intestine without hemorrhage; Former smoker; Nodule of spleen; History of coronary artery stent placement; Centrilobular emphysema; Anterior chest wall pain; 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  Qty: 60 tablet, Refills: 0    Associated Diagnoses: Shortness of breath; Pneumothorax, unspecified type; Pleural effusion; Pneumothorax; Pancreatic adenocarcinoma; Liver metastases; Thoracic compression fracture, closed, initial encounter; Coronary artery disease involving native coronary artery of native heart without angina pectoris; Acquired hypothyroidism; Gastroesophageal reflux disease with esophagitis; Vitamin D deficiency; Type 2 diabetes mellitus without complication, without long-term current use of insulin; Bone metastases; History of pancreatitis; Adrenal nodule; Lung nodule; Diverticulosis of large intestine without hemorrhage; Former smoker; Nodule of spleen; History of coronary  artery stent placement; Centrilobular emphysema; Anterior chest wall pain; 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 With correction scale for 33 units max TDD  Qty: 1 Box, Refills: 6    Associated Diagnoses: Type 2 diabetes mellitus without complication, with long-term current use of insulin      insulin glargine, TOUJEO, (TOUJEO SOLOSTAR) 300 unit/mL (1.5 mL) InPn pen Inject 4 Units into the skin 2 (two) times daily.  Qty: 1 Syringe, Refills: 12      ipratropium (ATROVENT) 0.02 % nebulizer solution Take 2.5 mLs (500 mcg total) by nebulization 4 (four) times daily. Rescue  Qty: 30 vial, Refills: 1    Associated Diagnoses: Pancreatic adenocarcinoma      levothyroxine (SYNTHROID) 100 MCG tablet TAKE 1 TABLET (100 MCG TOTAL) BY MOUTH ONCE DAILY.  Qty: 30 tablet, Refills: 10    Associated Diagnoses: Acquired hypothyroidism      lidocaine (LIDODERM) 5 % Place 1 patch onto the skin once daily. Remove & Discard patch within 12 hours or as directed by MD  Qty: 30 patch, Refills: 2      loratadine (CLARITIN) 10 mg tablet Take 10 mg by mouth once daily.      methadone (DOLOPHINE) 5 MG tablet Take 1 tablet (5 mg total) by mouth every 8 (eight) hours.  Qty: 90 tablet, Refills: 0    Associated Diagnoses: Pancreatic adenocarcinoma; Liver metastases; Thoracic compression fracture, closed, initial encounter      metoclopramide HCl (REGLAN) 10 MG tablet Take 1 tablet (10 mg total) by mouth 4 (four) times daily before meals and nightly.  Qty: 90 tablet, Refills: 1    Associated Diagnoses: History of pancreatic cancer      metoprolol tartrate (LOPRESSOR) 25 MG tablet Take 0.5 tablets (12.5 mg total) by mouth 2 (two) times daily.  Qty: 30 tablet, Refills: 2      multivitamin capsule Take 1 capsule by mouth once daily.      ondansetron (ZOFRAN-ODT) 8 MG TbDL  Take 1 tablet (8 mg total) by mouth every 6 (six) hours as needed.  Qty: 100 tablet, Refills: 1    Associated Diagnoses: Nausea and vomiting, intractability of vomiting not specified, unspecified vomiting type      pantoprazole (PROTONIX) 40 MG tablet Take 1 tablet (40 mg total) by mouth once daily.  Qty: 30 tablet, Refills: 2      predniSONE (DELTASONE) 20 MG tablet Take 2 tablets (40 mg total) by mouth once daily.  Qty: 60 tablet, Refills: 0    Associated Diagnoses: Shortness of breath      vitamin D 1000 units Tab Take 185 mg by mouth once daily.      atorvastatin (LIPITOR) 40 MG tablet Take 1 tablet (40 mg total) by mouth once daily.  Qty: 30 tablet, Refills: 2      diazePAM (VALIUM) 5 MG tablet Take 1 tablet (5 mg total) by mouth every 12 (twelve) hours as needed (muscle spasms).  Qty: 30 tablet, Refills: 1    Associated Diagnoses: Insomnia, unspecified type      lipase-protease-amylase 36,000-114,000- 180,000 unit CpDR Take 3 capsules by mouth 3 (three) times daily with meals.  Qty: 270 capsule, Refills: 5      nitroGLYCERIN (NITROSTAT) 0.4 MG SL tablet Place 1 tablet (0.4 mg total) under the tongue every 5 (five) minutes as needed for Chest pain.  Qty: 30 tablet, Refills: 0             Resume home diet and activity

## 2017-05-17 NOTE — DISCHARGE INSTRUCTIONS

## 2017-05-17 NOTE — H&P (VIEW-ONLY)
Chronic Pain - New Consult    Referring Physician: Wendi Nguyễn MD    Chief Complaint: No chief complaint on file.       SUBJECTIVE: Disclaimer: This note has been generated using voice-recognition software. There may be typographical errors that have been missed during proof-reading    Initial encounter:    Lashay Whitley presents to the clinic for the evaluation of cancer associated pain.    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   Started on FOLFIRINOX as neoadjuvant therapy and completed 4 cycles.  Started on neoadjuvant chemotherapy and XRT which was completed with some complications.  Underwent a Whipple procedure on 4/12/16.   Pathology was reviewed.   Specimens: Head of pancreas, duodenum, common bile duct, gallbladder  -Procedure: Pancreaticoduodenectomy (Whipple resection), partial pancreatectomy  -Tumor site: Pancreatic head  -Tumor size: 20 mm  -Histologic type: Ductal adenocarcinoma  -Histologic grade: Well-differentiated (grade 1 )  -Microscopic tumor extension: Tumor invades peripancreatic soft tissues  -Margins:  -Margins are negative for invasive carcinoma  -Distance to closest distance to closest retroperitoneal margin is 3 mm  -Distance to closest SMV margin is 1.5 mm  -Distance to closest pancreatic margin is 12 mm  -Treatment effect: Present, residual cancer with tumor regression but more than single cells or rare small groups of  cells (partial response, score 2)  -No lymphovascular invasion  -Perineural invasion is present  -Pathologic staging: ypT3 N1 MX  -Lymph nodes:  -Total number of lymph nodes examined: 25  -Total number of lymph nodes involved: 1  She started adjuvant gemcitabine but this was discontinued because of poor tolerance.  - she recurred with metastatic disease recently and has had rising   EUS with liver biopsy on 11/22/16 confirmed metastatic disease  CT scans reviewed as well  Bone involvement in T  spine required XRT    Pain Description:    The pain is located in the thoracic area at approximately T4/5.      At BEST  10/10     At WORST  10/10 on the WORST day.      On average pain is rated as 10/10.     Today the pain is rated as 10/10    The pain is described as aching, boring, sharp, shooting and throbbing      Symptoms interfere with daily activity and sleeping.     Exacerbating factors: Sitting, Standing, Laying, Bending, Touching, Eating, Walking, Night Time, Morning, Extension, Flexing, Lifting and Getting out of bed/chair.      Mitigating factors medications.     Patient denies urinary incontinence and bowel incontinence.  Patient denies any suicidal or homicidal ideations    Pain Medications:  Current:  Gabapentin 900mg  Hydrocodone/acetaminophen  Hydromorphone 4mg   Methadone    Physical Therapy/Home Exercise: no       report:  Reviewed and consistent with medication use as prescribed.    Pain Procedures: none recently      Imaging:   MRI cervical / thoracic / lumbar spine   Exam: MRI of the cervical and thoracic spine with and without contrast    History: Patient is a history of metastatic pancreatic cancer to the spine.  Patient has received radiation therapy spine.  Comparison is made to the prior MRI examination of the thoracic spine 12/08/2016.    Findings: MRI of the cervical and thoracic spine were performed with and without contrast.  There has been progression of the metastatic involvement compared with the prior MRI multiple levels.  The present study demonstrates multiple cervical and thoracic vertebrae that have focal low T1 signal, including C5, C6, C7, T1, T3, T4 and T5.  The mid and lower thoracic spine all have more homogeneously reduced marrow signal which may indicate diffuse marrow involvement marrow replacement.      Once again the T4 vertebra has diffuse marrow replacement and a compression fracture with 25% height loss which is slightly worse than before, and there is now more  posterior element involvement.  Following contrast administration, there is circumferential epidural enhancement centered at T4 surrounding the cord and effacing the CSF, narrowing the canal producing spinal stenosis.  The epidural enhancement extends slightly above and below the T4 level.  No additional new fractures are seen in the cervical and thoracic spine.    Degenerative changes are also present in the cervical spine with degenerative disc disease and spondylosis at C5-C6 without canal stenosis, but there is mild bilateral foramen narrowing.  Mild central disc bulges are present at C2-C3, C3-C4 and C4-C5.      In the thoracic spine the patient once again has a posterior disc protrusions at C2-C3 and especially C3-C4 which adds to the narrowing of the spinal canal.      Moderate-sized bilateral pleural effusions are present.   Impression        MRI of the cervical and thoracic spine demonstrates progressive involvement of metastatic disease compared to the prior MRI with more levels now involved with marrow placement.  There is slightly worse compression fracture and more epidural involvement at T4 effacing the CSF around the spinal cord.               Past Medical History:   Diagnosis Date    Breast cancer     bilateral    Broken rib     right    CAD (coronary artery disease)     Cataract     Centrilobular emphysema: per CT 2016 9/29/2016    Colon polyps     Coronary atherosclerosis of native coronary artery 4/24/2015    2 stents    Diabetes mellitus type 2 in nonobese     Diverticulosis of large intestine without hemorrhage 10/23/2015    Encounter for blood transfusion     Gastroesophageal reflux disease with esophagitis 10/23/2015    GI bleed     Hyperlipidemia     Hypertension     Myocardial infarction 2011    Myocardial infarction     Pancreas cancer     Pancreatitis     PONV (postoperative nausea and vomiting)     Type 2 diabetes mellitus with hyperglycemia 11/3/2015    Unspecified  essential hypertension 4/24/2015    Vertebral fracture, pathological     l3, right side     Past Surgical History:   Procedure Laterality Date    APPENDECTOMY      BREAST RECONSTRUCTION      post mast    CARDIAC CATHETERIZATION  10/29/11    CATARACT EXTRACTION W/  INTRAOCULAR LENS IMPLANT Left 04/09/2015    Dr. Warner    CATARACT EXTRACTION W/  INTRAOCULAR LENS IMPLANT Right 05/14/15    Dr Warner    COLONOSCOPY N/A 10/19/2016    Procedure: COLONOSCOPY;  Surgeon: Alexis Tubbs MD;  Location: [facility] ([address] [place] [address]);  Service: Endoscopy;  Laterality: N/A;    CORONARY ANGIOPLASTY WITH STENT PLACEMENT  09/08/2011    x2    HYSTERECTOMY      MASTECTOMY Bilateral     bilateral    RK/AK Bilateral     TONSILLECTOMY      WHIPPLE PROCEDURE W/ LAPAROSCOPY  4/2016     Social History     Social History    Marital status:      Spouse name: N/A    Number of children: N/A    Years of education: N/A     Occupational History    Not on file.     Social History Main Topics    Smoking status: Former Smoker     Packs/day: 1.50     Years: 40.00     Types: Cigarettes     Quit date: 10/1/2015    Smokeless tobacco: Never Used      Comment: currently vapes    Alcohol use No    Drug use: No    Sexual activity: Yes     Partners: Male     Other Topics Concern    Not on file     Social History Narrative     Family History   Problem Relation Age of Onset    Colon cancer Father 60    Cancer Father     Crohn's disease Sister     Diabetes Sister     Cancer Sister      breast    Hypertension Mother     Macular degeneration Mother     Cancer Mother      breast    Diabetes Mother     Cancer Sister      breast    Diabetes Sister     Heart disease Brother     Diabetes Maternal Aunt     Diabetes Maternal Grandmother     Ulcerative colitis Neg Hx        Review of patient's allergies indicates:   Allergen Reactions    Penicillins Swelling    Demerol [meperidine] Swelling    Dilaudid [hydromorphone (bulk)] Other (See  "Comments)     Redness  ------------pt states no longer allergic bc she takes dilaudid pills at home    Percocet [oxycodone-acetaminophen] Swelling    Adhesive tape-silicones Rash       Current Outpatient Prescriptions   Medication Sig    albuterol 90 mcg/actuation inhaler Inhale 1-2 puffs into the lungs every 6 (six) hours as needed for Wheezing or Shortness of Breath. Rescue    ascorbic acid, vitamin C, (VITAMIN C) 500 MG tablet Take 500 mg by mouth 2 (two) times daily.    aspirin (ECOTRIN) 81 MG EC tablet Take 81 mg by mouth once daily.    atorvastatin (LIPITOR) 40 MG tablet Take 1 tablet (40 mg total) by mouth once daily.    BD ULTRA-FINE EDWIN PEN NEEDLES 32 x 5/32 " Ndle Uses 5 times daily, on multiple daily insulin injections    gabapentin (NEURONTIN) 300 MG capsule Take 1 capsule (300 mg total) by mouth 3 (three) times daily.    hydrocodone-acetaminophen 10-325mg (NORCO)  mg Tab Take 1 tablet by mouth every 4 (four) hours as needed.    HYDROmorphone (DILAUDID) 4 MG tablet Take 1 tablet (4 mg total) by mouth every 3 (three) hours as needed.    insulin aspart (NOVOLOG FLEXPEN) 100 unit/mL InPn pen Inject 6 Units into the skin 3 (three) times daily with meals. With correction scale for 33 units max TDD (Patient taking differently: Inject 5 Units into the skin 3 (three) times daily with meals. With correction scale for 33 units max TDD)    insulin glargine, TOUJEO, (TOUJEO SOLOSTAR) 300 unit/mL (1.5 mL) InPn pen Inject 4 Units into the skin 2 (two) times daily.    ipratropium (ATROVENT) 0.02 % nebulizer solution Take 2.5 mLs (500 mcg total) by nebulization 4 (four) times daily. Rescue    levothyroxine (SYNTHROID) 100 MCG tablet Take 1 tablet (100 mcg total) by mouth once daily.    loratadine (CLARITIN) 10 mg tablet Take 10 mg by mouth once daily.    methadone (DOLOPHINE) 5 MG tablet Take 1 tablet (5 mg total) by mouth every 8 (eight) hours.    metoclopramide HCl (REGLAN) 10 MG tablet Take 1 " tablet (10 mg total) by mouth 4 (four) times daily before meals and nightly.    metoprolol tartrate (LOPRESSOR) 25 MG tablet Take 0.5 tablets (12.5 mg total) by mouth 2 (two) times daily.    multivitamin capsule Take 1 capsule by mouth once daily.    nitroGLYCERIN (NITROSTAT) 0.4 MG SL tablet Place 1 tablet (0.4 mg total) under the tongue every 5 (five) minutes as needed for Chest pain.    ondansetron (ZOFRAN-ODT) 8 MG TbDL Take 1 tablet (8 mg total) by mouth every 6 (six) hours as needed.    pantoprazole (PROTONIX) 40 MG tablet Take 1 tablet (40 mg total) by mouth once daily.    predniSONE (DELTASONE) 20 MG tablet Take 2 tablets (40 mg total) by mouth once daily.    vitamin D 1000 units Tab Take 185 mg by mouth once daily.    diazePAM (VALIUM) 5 MG tablet Take 1 tablet (5 mg total) by mouth every 12 (twelve) hours as needed (muscle spasms).    lidocaine (LIDODERM) 5 % Place 1 patch onto the skin once daily. Remove & Discard patch within 12 hours or as directed by MD    lipase-protease-amylase 36,000-114,000- 180,000 unit CpDR Take 3 capsules by mouth 3 (three) times daily with meals.     No current facility-administered medications for this visit.        REVIEW OF SYSTEMS:    GENERAL:  No weight loss, malaise or fevers.  HEENT:   No recent changes in vision or hearing  NECK:  Negative for lumps, no difficulty with swallowing.  RESPIRATORY:  SOB, pleural effusion - recent cath placed.  CARDIOVASCULAR:  Negative for chest pain, leg swelling or palpitations.  GI:  Negative for abdominal discomfort, blood in stools or black stools or change in bowel habits. GERD, controlled, nausea - ondansetron  MUSCULOSKELETAL:  See HPI.  SKIN:  Negative for lesions, rash, and itching.  PSYCH:  No mood disorder or recent psychosocial stressors.  Patients sleep is disturbed secondary to pain.  HEMATOLOGY/LYMPHOLOGY:  Negative for prolonged bleeding, bruising easily or swollen nodes.  On plavix, recently stopped  ENDO: IDDM  "and hypothyroidism  NEURO:   No history of headaches, syncope, paralysis, seizures or tremors.  All other reviewed and negative other than HPI.    OBJECTIVE:    /74  Pulse 61  Temp 97.5 °F (36.4 °C) (Oral)   Resp 20  Ht 4' 11" (1.499 m)  Wt 45.4 kg (100 lb)  BMI 20.2 kg/m2    PHYSICAL EXAMINATION:    GENERAL: Well appearing, in no acute distress, alert and oriented x3.  PSYCH:  Mood and affect appropriate.  SKIN: Skin color, texture, turgor normal, no rashes or lesions.  HEAD/FACE:  Normocephalic, atraumatic. Cranial nerves grossly intact.  CV: RRR with palpation of the radial artery.  PULM: on O2, with pleurx catheter.  BACK: Pain to palpation over the paraspinal thoracic and spinous processes at T4/5  EXTREMITIES: Peripheral joint ROM is full and pain free without obvious instability or laxity in all four extremities. No deformities, edema, or skin discoloration. Good capillary refill.  MUSCULOSKELETAL:   There is  pain with palpation over the sacroiliac joints bilaterally.  There is no pain to palpation over the greater trochanteric bursa bilaterally.  FABERs test is positive.  FADIRs test is negative.   Bilateral lower extremity strength is normal and symmetric.  No atrophy or tone abnormalities are noted.  NEURO: Bilateral lower extremity coordination and muscle stretch reflexes are physiologic and symmetric.  Plantar response are downgoing. No clonus.  No loss of sensation is noted.  GAIT: Antalgic, uses wheelchair    ASSESSMENT: 57 y.o. year old female with pain, consistent with     Encounter Diagnoses   Name Primary?    Thoracic compression fracture, closed, initial encounter Yes    Liver metastases     Bone metastases     Anterior chest wall pain        PLAN:   We will attempt to coordinate care with hospice    Trigger Point Injection:   The procedure was discussed with the patient including complications of nerve damage,  bleeding, infection, and failure of pain relief.   Trigger points " were identified by palpation and marked. Chlorhexidine prep of sites done. A mixture of 9mL 0.25% bupivacaine +40mg Depo-Medrol was prepared (10 mL total).   A 27-gauge needle was advanced to the point of maximal tenderness, and  1.5 mL  was injected after negative aspiration. All sites done in the same manner. Patient tolerated the procedure well and without complications. Sites injected included: paraspinal muscles on the left side  at approximately T4-T5    Lidocaine patches to be placed over the chest port    I will schedule the patient for a thoracic epidural at T6    I will contact Dr. Ochoa to evaluate for possible thoracic kyphoplasty at the level of T4 with a compression fracture exists.    Continue holding Plavix    Escalate gabapentin to 1800 mg per day    Greater than 25 minutes spent in total in todays visit with the patient, with more than half that time direct face to face counseling and education with the patient today. We discussed the disease process, prognosis, treatment plan, and risks and benefits.     Kavita Crump  05/12/2017

## 2017-05-17 NOTE — OP NOTE
Thoracic Interlaminar Epidural Steroid Injection under Fluoroscopic Guidance.   Time-out taken to identify patient and procedure prior to starting the procedure.     05/17/2017    PROCEDURE: Interlaminar epidural steroid injection under fluoroscopic guidance.     Pre-Op diagnosis: thoracic compression fracture and cancer associated pain    Post-Op diagnosis: same    PHYSICIAN: KARRI BERRY     ASSISTANTS: None     ESTIMATED BLOOD LOSS: none.     COMPLICATIONS: none.     SPECIMENS: none    TECHNIQUE: With the patient laying in a prone position, the area was prepped and draped in the usual sterile fashion using ChloraPrep and a fenestrated drape. 1% lidocaine was given using a 27-gauge needle by raising a wheal and going down to the hub of the needle over the T5/6 interlaminar space (previous attempt at the T6/7 space, but could not engage the flavum secondary to osteophytes in the path of the needle.  The interlaminar space was then approached with a 3.5 inch 20-gauge Touhy needle was introduced under fluoroscopic guidance in the AP and Lateral view. Once the Ligamentum flavum was encountered loss of resistance to saline was used to enter the epidural space. With positive loss of resistance and negative CSF or Blood, 3mL contrast dye Omnipaque (300mg/ml) was injected to confirm placement and there was no vascular runoff. Then 1ml 80mg/ml Depomedrol + 1mL 0.25% Bupivicaine + 8mL preservative free normal saline was injected slowly. Displacement of the radio opaque contrast after injection of the medication confirmed that the medication went into the area of the epidural space.  The patient tolerated the procedure well.     Conscious sedation provided by M.D    The patient was monitored with continuous pulse oximetry, EKG, and intermittent blood pressure monitors.  The patient was hemodynamically stable throughout the entire process was responsive to voice, and breathing spontaneously.  Supplemental O2 was provided  at 2L/min via nasal cannula.  Patient was comfortable for the duration of the procedure.    There was a total of 2mg IV Midazolam and 100mcg Fentanyl was titrated for the procedure      The patient was monitored after the procedure.   They were given post-procedure and discharge instructions to follow at home.  The patient was discharged in a stable condition.

## 2017-05-18 NOTE — PHYSICIAN QUERY
PT Name: Lashay Whitley  MR #: [id number]    Physician Query Form - Respiratory Condition Clarification      CDS/: Orin Trinh               Contact information: [email or web address]    This form is a permanent document in the medical record.    Query Date: May 18, 2017    By submitting this query, we are merely seeking further clarification of documentation. Please utilize your independent clinical judgment when addressing the question(s) below.    The Medical record contains the following   Indicators   Supporting Clinical Findings Location in Medical Record   x   SOB, GOLD, Wheezing, Productive Cough, Use of Accessory Muscles, etc. She has wheezes (bilaterally). ED note   x   Acute/Chronic Illness 57/F with PMH DMII, HTN, HLD, CAD s/p stenting 2012 with recent NSTEMI 03/2017, remote h/o breast cancer, pancreatic cancer with bony and liver metastasis treated with chemo and radiation followed by Dr. Nguyễn who presented  to ED with worsening SOB H&P   x   Radiology Findings IMPRESSION:     Right pneumothorax, with possible chest tube versus extraneous material overlying the patient, correlation is recommended.     Enlarging left pleural effusion, underlying consolidation is not excluded. This is in the setting of diffuse edema with likely underlying chronic pulmonary change. Correlation recommended.   Chest xray 5/7   x   Respiratory Distress or Failure # Hypoxemia Respiratory Failure   # SOB   # Pneumothorax - on right lung   # Pleural effusion - on left lung   - SOB 2/2 pneumothorax vs. Pleural effusion; both likely from progressive malignancy   - clinically, patient feels more relief c/w initial presentaiton   - per Pulm recs, CTA to r/o PE   - pneumothorax on right lung vs trapped lung s/p chest tube placement 5/7/17; will monitor CT output q24h. Appreciate Pulm assistance  PN 5/11        Hypoxia or Hypercapnia        RR         ABGs         O2 sat        BiPAP/Intubation        Supplemental O2     x   Home O2,  Oxygen Dependence complaint of an episode of shortness of breath with associated wheezing, bradycardia (to 35 BPM), diaphoresis, and oxygen saturation to 85% while sitting on the toilet having diarrhea less than 1 hour ago. H&P      Treatment        Other       Provider, please specify diagnosis or diagnoses associated with above clinical findings.    [ x ] Acute Respiratory Failure with Hypoxia  [  ] Acute Respiratory Failure with Hypercapnia  [  ] Acute Respiratory Failure with Hypoxia and Hypercapnia  [  ] Other Acute Respiratory Failure  [  ] Acute and (on) Chronic Respiratory Failure with Hypoxia  [  ] Acute and (on) Chronic Respiratory Failure with Hypercapnia  [  ] Acute and (on) Chronic Respiratory Failure with Hypoxia and Hypercapnia  [  ] Other Acute and (on) Chronic Respiratory Failure  [  ] Chronic Respiratory Failure with Hypoxia  [  ] Chronic Respiratory Failure with Hypercapnia  [  ] Chronic Respiratory Failure with Hypoxia and Hypercapnia  [  ] Other Chronic Respiratory Failure  [  ] Other Respiratory Diagnosis (please specify): _______________________________  [  ] Clinically Undetermined    Please document in your progress notes daily for the duration of treatment until resolved and include in your discharge summary.

## 2017-05-22 ENCOUNTER — TELEPHONE (OUTPATIENT)
Dept: PAIN MEDICINE | Facility: CLINIC | Age: 57
End: 2017-05-22

## 2017-05-22 NOTE — TELEPHONE ENCOUNTER
----- Message from Liza Rios sent at 5/22/2017  9:10 AM CDT -----  _x  1st Request  _  2nd Request  _  3rd Request        Who: lakesha pt.      Why: pt.  would like to speak with dr. washington or nurse.please call asap.    What Number to Call Back:[phone]    When to Expect a call back: (Before the end of the day)   -- if the call is after 12:00, the call back will be tomorrow.

## 2017-05-22 NOTE — TELEPHONE ENCOUNTER
Contacted and spoke to  due to the patient being with the nurse. He wanted the office aware that patient is currently under Hospice care, and they are going to do something call revoke hospice to have the procedure covered under the insurance and then once the procedure is done, they will sign back up with Hospice.

## 2017-05-25 ENCOUNTER — HOSPITAL ENCOUNTER (OUTPATIENT)
Facility: OTHER | Age: 57
Discharge: HOME | End: 2017-05-25
Attending: ANESTHESIOLOGY | Admitting: ANESTHESIOLOGY
Payer: COMMERCIAL

## 2017-05-25 VITALS
TEMPERATURE: 98 F | HEIGHT: 59 IN | WEIGHT: 97 LBS | OXYGEN SATURATION: 95 % | HEART RATE: 57 BPM | DIASTOLIC BLOOD PRESSURE: 64 MMHG | SYSTOLIC BLOOD PRESSURE: 106 MMHG | RESPIRATION RATE: 18 BRPM | BODY MASS INDEX: 19.56 KG/M2

## 2017-05-25 DIAGNOSIS — M48.54XD: ICD-10-CM

## 2017-05-25 DIAGNOSIS — S22.000A THORACIC COMPRESSION FRACTURE: ICD-10-CM

## 2017-05-25 DIAGNOSIS — S22.000A THORACIC COMPRESSION FRACTURE, CLOSED, INITIAL ENCOUNTER: Primary | ICD-10-CM

## 2017-05-25 LAB — POCT GLUCOSE: 149 MG/DL (ref 70–110)

## 2017-05-25 PROCEDURE — 82947 ASSAY GLUCOSE BLOOD QUANT: CPT | Performed by: ANESTHESIOLOGY

## 2017-05-25 PROCEDURE — 22513 PERQ VERTEBRAL AUGMENTATION: CPT | Performed by: ANESTHESIOLOGY

## 2017-05-25 PROCEDURE — 88311 DECALCIFY TISSUE: CPT | Mod: 26,,, | Performed by: PATHOLOGY

## 2017-05-25 PROCEDURE — 99152 MOD SED SAME PHYS/QHP 5/>YRS: CPT | Mod: ,,, | Performed by: ANESTHESIOLOGY

## 2017-05-25 PROCEDURE — 88307 TISSUE EXAM BY PATHOLOGIST: CPT | Mod: 26,,, | Performed by: PATHOLOGY

## 2017-05-25 PROCEDURE — C1713 ANCHOR/SCREW BN/BN,TIS/BN: HCPCS | Performed by: ANESTHESIOLOGY

## 2017-05-25 PROCEDURE — 25500020 PHARM REV CODE 255: Performed by: ANESTHESIOLOGY

## 2017-05-25 PROCEDURE — 25000003 PHARM REV CODE 250: Performed by: ANESTHESIOLOGY

## 2017-05-25 PROCEDURE — 27201020 HC KYPHOPLASTY INTRODUCER SET: Performed by: ANESTHESIOLOGY

## 2017-05-25 PROCEDURE — 88307 TISSUE EXAM BY PATHOLOGIST: CPT | Performed by: PATHOLOGY

## 2017-05-25 PROCEDURE — 25000003 PHARM REV CODE 250: Performed by: PHYSICAL MEDICINE & REHABILITATION

## 2017-05-25 PROCEDURE — 22513 PERQ VERTEBRAL AUGMENTATION: CPT | Mod: ,,, | Performed by: ANESTHESIOLOGY

## 2017-05-25 PROCEDURE — 22515 PERQ VERTEBRAL AUGMENTATION: CPT | Mod: ,,, | Performed by: ANESTHESIOLOGY

## 2017-05-25 PROCEDURE — 22515 PERQ VERTEBRAL AUGMENTATION: CPT | Performed by: ANESTHESIOLOGY

## 2017-05-25 PROCEDURE — 62323 NJX INTERLAMINAR LMBR/SAC: CPT

## 2017-05-25 PROCEDURE — 63600175 PHARM REV CODE 636 W HCPCS: Performed by: ANESTHESIOLOGY

## 2017-05-25 RX ORDER — FENTANYL CITRATE 50 UG/ML
INJECTION, SOLUTION INTRAMUSCULAR; INTRAVENOUS
Status: DISCONTINUED | OUTPATIENT
Start: 2017-05-25 | End: 2017-05-25 | Stop reason: HOSPADM

## 2017-05-25 RX ORDER — BUPIVACAINE HYDROCHLORIDE 2.5 MG/ML
INJECTION, SOLUTION EPIDURAL; INFILTRATION; INTRACAUDAL
Status: DISCONTINUED | OUTPATIENT
Start: 2017-05-25 | End: 2017-05-25 | Stop reason: HOSPADM

## 2017-05-25 RX ORDER — MIDAZOLAM HYDROCHLORIDE 1 MG/ML
INJECTION INTRAMUSCULAR; INTRAVENOUS
Status: DISCONTINUED | OUTPATIENT
Start: 2017-05-25 | End: 2017-05-25 | Stop reason: HOSPADM

## 2017-05-25 RX ORDER — LIDOCAINE HYDROCHLORIDE 10 MG/ML
INJECTION INFILTRATION; PERINEURAL
Status: DISCONTINUED | OUTPATIENT
Start: 2017-05-25 | End: 2017-05-25 | Stop reason: HOSPADM

## 2017-05-25 RX ORDER — CEFAZOLIN SODIUM 1 G/50ML
1 SOLUTION INTRAVENOUS ONCE
Status: DISCONTINUED | OUTPATIENT
Start: 2017-05-25 | End: 2017-05-25

## 2017-05-25 RX ORDER — SODIUM CHLORIDE 9 MG/ML
INJECTION, SOLUTION INTRAMUSCULAR; INTRAVENOUS; SUBCUTANEOUS
Status: DISCONTINUED | OUTPATIENT
Start: 2017-05-25 | End: 2017-05-25 | Stop reason: HOSPADM

## 2017-05-25 RX ORDER — CLINDAMYCIN PHOSPHATE 900 MG/50ML
900 INJECTION, SOLUTION INTRAVENOUS ONCE
Status: COMPLETED | OUTPATIENT
Start: 2017-05-25 | End: 2017-05-25

## 2017-05-25 RX ORDER — SODIUM CHLORIDE 9 MG/ML
500 INJECTION, SOLUTION INTRAVENOUS CONTINUOUS
Status: DISCONTINUED | OUTPATIENT
Start: 2017-05-25 | End: 2017-05-25 | Stop reason: HOSPADM

## 2017-05-25 RX ADMIN — SODIUM CHLORIDE 500 ML: 0.9 INJECTION, SOLUTION INTRAVENOUS at 08:05

## 2017-05-25 RX ADMIN — CLINDAMYCIN IN 5 PERCENT DEXTROSE 900 MG: 18 INJECTION, SOLUTION INTRAVENOUS at 08:05

## 2017-05-25 NOTE — DISCHARGE INSTRUCTIONS

## 2017-05-25 NOTE — DISCHARGE SUMMARY
Discharge Diagnosis:Compression fracture of T12 vertebra, initial encounter [M48.54XA]  Condition on Discharge: Stable.  Diet on Discharge: Same as before.  Activity: as per instruction sheet.  Discharge to: Home with a responsible adult.  Follow up: as per Discharge instructions

## 2017-05-25 NOTE — INTERVAL H&P NOTE
The patient has been examined and the H&P has been reviewed:    I concur with the findings and changes have been noted since the H&P was written: Patient reports 3-4 days of bowel incontinence and left arm numbness, tingling.     Anesthesia/Surgery risks, benefits and alternative options discussed and understood by patient/family.    GEN: NAD, affect appropriate  HEENT: Normocephalic. Atraumatic. Extra ocular muscles intact  CV: RRR, S1 and S2  PULM: Breathing unlabored, mild wheezing in all 4 quadrants, + chest tube  Abdomen: + BS, soft, non-tender     Plan: will proceed with previously scheduled kyphoplasty.             Active Hospital Problems    Diagnosis  POA    Thoracic compression fracture [S22.000A]  Yes      Resolved Hospital Problems    Diagnosis Date Resolved POA   No resolved problems to display.

## 2017-05-25 NOTE — OP NOTE
Kyphoplasty of T3 and Vertebroplasty of T4  Time-out taken to identify patient and procedure side prior to starting the procedure.   I attest that I have reviewed the patient's home medications prior to the procedure and no contraindication have been identified. I  re-evaluated the patient after the patient was positioned for the procedure in the procedure room immediately before the procedural time-out. The vital signs are current and represent the current state of the patient which has not significantly changed since the preprocedure assessment.                                                                Date of Service: 05/25/2017    PCP: April Kelsey MD    Referring Physician:                                                                           SURGEON:  Shahla Ochoa MD  ASSISTANTS: Jayy Petersen DO                                                                                                                                         PROCEDURE PERFORMED:    1.  Transpedicular kyphoplasty at the T3 level, on the Right side using curved needle.  2. Transpedicular vertebroplasty at the T4 level, on the Left side using curved needle.   3.  Bone biopsy at T3 and T4 level   4.  Epidural intralaminar injection at T3-T4 without corticosteroids.                                                                               PREPROCEDURE DIAGNOSIS:                                                      1.  Compression fracture of the lumbar vertebra at the level T3 and T4.         2.  Pain at these levels of compression fracture.                            3.  No myelopathy or retropulsed fragments.                                                                                                             POSTPROCEDURE DIAGNOSIS:   same as above                                                                                                                             SEDATION USED: Versed 5mg IV and Fentanyl  25mcg IV                                                                                                                 LOCAL ANESTHETIC USED:  8 mL at each level from a mixture of Xylocaine 1% 9ml with Sodium Bicarbonate 1ml                                                                               ESTIMATED BLOOD LOSS: Minimal.                                                                                                                          COMPLICATIONS: None.                                                                                                                                                                                                                 TECHNIQUE: With the patient lying in the prone position and after receiving the intravenous sedation, the area was prepped and draped using the     usual sterile fashion, using ChloraPrep and a body fenestrated drape.  The area of the compression fractures was determined under fluoroscopic guidance    using fluoroscope.  Local anesthetic was given with a       27-gauge 1-1/4-inch needle.  That was followed with completing the local   anesthetic injection with a 3-1/2-inch 22-gauge spinal needle going down     all the way to the periosteum of the desired pedicle.  A 3-mm    longitudinal incision done around the 22-gauge spinal needle so we can introduce the 10-gauge trocar and cannula to the vertebral body.  Through    a transpedicular approach, the trocar and cannula were introduced and  advanced slowly.  Once in the dorsal one-third of the vertebral body, the bone biopsy cannula was introduced to the ventral portion of the vertebral      body.  Biopsy was obtained in the usual fashion.  The balloon was introduced to the anterior vertebral body of L3 & inflated up to 1cc. The     methyl methacrylate resin was mixed together  in the Carefusion mixer and the amounts were applied to each level.  No   escape was observed while introducing the  cement and this was done under   live fluoroscopy.  The cannula was applied to each trocar under live   fluoroscopy as well.  Each trocar and cannula was removed under live  fluoroscopy in a very slow fashion to observe the contrast-impregnated   cement. The same process was repeated at L4 except without balloon..                                                                                                                                              The patient tolerated the procedure well        Pain prior to the procedure: 3/10                                                                                Pain after the procedure: 0/10                                                                               The patient was monitored after the procedure for a period of 2-3 hours,     Was given post-procedure and discharge instructions at home.  Instruction regarding bandage were given.  The patient was advised to call again/follow-up if needed, otherwise to follow up with us in 2 weeks at our clinic.

## 2017-05-30 ENCOUNTER — TELEPHONE (OUTPATIENT)
Dept: HEMATOLOGY/ONCOLOGY | Facility: CLINIC | Age: 57
End: 2017-05-30

## 2017-05-30 NOTE — TELEPHONE ENCOUNTER
"----- Message from Wendi Nguyễn MD sent at 5/30/2017  9:57 AM CDT -----  Called - had vertebroplasty last week  Pain levels increased  Cannot walk very far with oxygen levels  Hospice RN coming today    They did have a private wedding ceremony a couple of weeks ago for daughter    ----- Message -----  From: Shahla Ochoa MD  Sent: 5/30/2017   8:41 AM  To: April Kelsey MD, Wendi Nguyễn MD, #    Please review her bone biopsy results done during kyphoplasty  1. BONE (T4 VERTEBRAL BODY, CLINICAL): MARROW FIBROSIS AND NESTS OF "GHOST" CELLS,  SUGGESTIVE OF NECROTIC TUMOR.  2. BONE (T3 VERTEBRAL BODY, CLINICAL): METASTATIC ADENOCARCINOMA.  Note: The tumor is morphologically consistent with adenocarcinoma of pancreatic origin.  Thank you  Shahla    "

## 2017-06-06 ENCOUNTER — TELEPHONE (OUTPATIENT)
Dept: PAIN MEDICINE | Facility: CLINIC | Age: 57
End: 2017-06-06

## 2017-06-07 ENCOUNTER — TELEPHONE (OUTPATIENT)
Dept: HEMATOLOGY/ONCOLOGY | Facility: CLINIC | Age: 57
End: 2017-06-07

## 2017-06-07 NOTE — TELEPHONE ENCOUNTER
Called pt  and offered condolences.   Thanked him for letting us be apart of pt's care.   Pt spouse wanted to express how thankful he was for nurse, Dr. Nguyễn, and chemo nurse---Zakiya.   Spouse stated that services are located at [facility] at 12pm.   I informed spouse I would let Dr. Nguyễn know of services and have her call and express condolences as well.   Spouse verbalized understanding.       ----- Message from Lucinda Forte sent at 6/7/2017  4:28 PM CDT -----  Contact: Hospice   Pt has passed away

## 2017-09-22 DIAGNOSIS — E11.9 TYPE 2 DIABETES MELLITUS WITHOUT COMPLICATION: ICD-10-CM

## 2019-07-11 NOTE — LETTER
March 31, 2017    Lashay Whitley  [address]  [place] [address]             [facility]  [address], [address]  [place] [facility] [address]  Phone: [phone]     Mrs. Lashay Whitley is on narcotic medication with our clinic for her underling cancer diagnosis. She has been advised to take these medications on a daily schedule and to carry them with her at all times when traveling.    Please understand that these medications are medically necessary and she has appropriate legal prescriptions.    Thank you,      Wendi Nguyễn MD     
Alert-The patient is alert, awake and responds to voice. The patient is oriented to time, place, and person. The triage nurse is able to obtain subjective information.

## 2019-12-22 NOTE — PROGRESS NOTES
Post thoracentesis CXR without change on the right side (PTX has not enlarged) and now with PTX on left post thoracentesis.  No shift in mediastinum.  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  Will recheck CXR in one hour to ensure that left is not expanding.  Increasing interstitial infiltrates likely represents lymphangitic spread of disease but Chest CT would be most helpful.     normal...

## 2021-10-20 NOTE — PROGRESS NOTES
Automatic lab orders were signed in error.  The patient is .  I have canceled any outstanding pending or standing orders for this patient.   Patients appointment has been rescheduled for a MWV on 12/31/21.
